# Patient Record
Sex: MALE | Race: WHITE | NOT HISPANIC OR LATINO
[De-identification: names, ages, dates, MRNs, and addresses within clinical notes are randomized per-mention and may not be internally consistent; named-entity substitution may affect disease eponyms.]

---

## 2017-01-24 ENCOUNTER — APPOINTMENT (OUTPATIENT)
Dept: PULMONOLOGY | Facility: CLINIC | Age: 77
End: 2017-01-24

## 2017-02-19 ENCOUNTER — RX RENEWAL (OUTPATIENT)
Age: 77
End: 2017-02-19

## 2017-03-08 ENCOUNTER — APPOINTMENT (OUTPATIENT)
Dept: PULMONOLOGY | Facility: CLINIC | Age: 77
End: 2017-03-08

## 2017-03-11 ENCOUNTER — RX RENEWAL (OUTPATIENT)
Age: 77
End: 2017-03-11

## 2017-04-05 ENCOUNTER — RX RENEWAL (OUTPATIENT)
Age: 77
End: 2017-04-05

## 2017-04-12 ENCOUNTER — APPOINTMENT (OUTPATIENT)
Dept: PULMONOLOGY | Facility: CLINIC | Age: 77
End: 2017-04-12

## 2017-04-12 VITALS
OXYGEN SATURATION: 96 % | HEART RATE: 75 BPM | HEIGHT: 69 IN | DIASTOLIC BLOOD PRESSURE: 60 MMHG | WEIGHT: 228 LBS | SYSTOLIC BLOOD PRESSURE: 113 MMHG | BODY MASS INDEX: 33.77 KG/M2 | RESPIRATION RATE: 15 BRPM

## 2017-04-12 DIAGNOSIS — Z87.09 PERSONAL HISTORY OF OTHER DISEASES OF THE RESPIRATORY SYSTEM: ICD-10-CM

## 2017-08-11 ENCOUNTER — RX RENEWAL (OUTPATIENT)
Age: 77
End: 2017-08-11

## 2017-08-23 ENCOUNTER — APPOINTMENT (OUTPATIENT)
Dept: PULMONOLOGY | Facility: CLINIC | Age: 77
End: 2017-08-23
Payer: MEDICARE

## 2017-08-23 VITALS
WEIGHT: 228 LBS | HEART RATE: 67 BPM | BODY MASS INDEX: 36.64 KG/M2 | RESPIRATION RATE: 16 BRPM | DIASTOLIC BLOOD PRESSURE: 66 MMHG | OXYGEN SATURATION: 93 % | HEIGHT: 66 IN | SYSTOLIC BLOOD PRESSURE: 105 MMHG

## 2017-08-23 PROCEDURE — 99214 OFFICE O/P EST MOD 30 MIN: CPT | Mod: 25

## 2017-08-23 PROCEDURE — 94010 BREATHING CAPACITY TEST: CPT

## 2017-08-23 RX ORDER — LORATADINE 5 MG
TABLET,CHEWABLE ORAL
Refills: 0 | Status: ACTIVE | COMMUNITY

## 2018-01-03 ENCOUNTER — APPOINTMENT (OUTPATIENT)
Dept: PULMONOLOGY | Facility: CLINIC | Age: 78
End: 2018-01-03
Payer: MEDICARE

## 2018-01-03 VITALS
OXYGEN SATURATION: 94 % | HEART RATE: 66 BPM | BODY MASS INDEX: 34.8 KG/M2 | RESPIRATION RATE: 16 BRPM | SYSTOLIC BLOOD PRESSURE: 135 MMHG | HEIGHT: 69 IN | WEIGHT: 235 LBS | DIASTOLIC BLOOD PRESSURE: 70 MMHG

## 2018-01-03 PROCEDURE — 99214 OFFICE O/P EST MOD 30 MIN: CPT | Mod: 25

## 2018-01-03 PROCEDURE — 94010 BREATHING CAPACITY TEST: CPT

## 2018-01-25 ENCOUNTER — RX RENEWAL (OUTPATIENT)
Age: 78
End: 2018-01-25

## 2018-02-28 ENCOUNTER — RX RENEWAL (OUTPATIENT)
Age: 78
End: 2018-02-28

## 2018-03-01 ENCOUNTER — APPOINTMENT (OUTPATIENT)
Dept: UROLOGY | Facility: CLINIC | Age: 78
End: 2018-03-01
Payer: MEDICARE

## 2018-03-01 ENCOUNTER — OUTPATIENT (OUTPATIENT)
Dept: OUTPATIENT SERVICES | Facility: HOSPITAL | Age: 78
LOS: 1 days | End: 2018-03-01
Payer: MEDICARE

## 2018-03-01 PROCEDURE — 76870 US EXAM SCROTUM: CPT

## 2018-03-01 PROCEDURE — 93975 VASCULAR STUDY: CPT | Mod: 26

## 2018-03-01 PROCEDURE — 76870 US EXAM SCROTUM: CPT | Mod: 26

## 2018-03-01 PROCEDURE — 93975 VASCULAR STUDY: CPT

## 2018-03-01 PROCEDURE — 99204 OFFICE O/P NEW MOD 45 MIN: CPT | Mod: 25

## 2018-03-02 LAB
BASOPHILS # BLD AUTO: 0.02 K/UL
BASOPHILS NFR BLD AUTO: 0.3 %
EOSINOPHIL # BLD AUTO: 0.04 K/UL
EOSINOPHIL NFR BLD AUTO: 0.7 %
HBA1C MFR BLD HPLC: 6.6 %
HCT VFR BLD CALC: 41.2 %
HGB BLD-MCNC: 13.5 G/DL
IMM GRANULOCYTES NFR BLD AUTO: 0.3 %
LYMPHOCYTES # BLD AUTO: 1.34 K/UL
LYMPHOCYTES NFR BLD AUTO: 23 %
MAN DIFF?: NORMAL
MCHC RBC-ENTMCNC: 29.5 PG
MCHC RBC-ENTMCNC: 32.8 GM/DL
MCV RBC AUTO: 90 FL
MONOCYTES # BLD AUTO: 0.48 K/UL
MONOCYTES NFR BLD AUTO: 8.2 %
NEUTROPHILS # BLD AUTO: 3.93 K/UL
NEUTROPHILS NFR BLD AUTO: 67.5 %
PLATELET # BLD AUTO: 225 K/UL
RBC # BLD: 4.58 M/UL
RBC # FLD: 14.1 %
WBC # FLD AUTO: 5.83 K/UL

## 2018-03-05 DIAGNOSIS — N45.1 EPIDIDYMITIS: ICD-10-CM

## 2018-03-05 DIAGNOSIS — N48.1 BALANITIS: ICD-10-CM

## 2018-03-20 ENCOUNTER — APPOINTMENT (OUTPATIENT)
Dept: UROLOGY | Facility: CLINIC | Age: 78
End: 2018-03-20
Payer: MEDICARE

## 2018-03-20 PROCEDURE — 99213 OFFICE O/P EST LOW 20 MIN: CPT

## 2018-03-21 LAB
APPEARANCE: CLEAR
BILIRUBIN URINE: NEGATIVE
BLOOD URINE: NEGATIVE
COLOR: YELLOW
GLUCOSE QUALITATIVE U: 500 MG/DL
KETONES URINE: NEGATIVE
LEUKOCYTE ESTERASE URINE: NEGATIVE
NITRITE URINE: NEGATIVE
PH URINE: 5
PROTEIN URINE: NEGATIVE MG/DL
SPECIFIC GRAVITY URINE: 1.02
UROBILINOGEN URINE: NEGATIVE MG/DL

## 2018-03-29 LAB — CORE LAB FLUID CYTOLOGY: NORMAL

## 2018-04-10 ENCOUNTER — APPOINTMENT (OUTPATIENT)
Dept: UROLOGY | Facility: CLINIC | Age: 78
End: 2018-04-10
Payer: MEDICARE

## 2018-04-10 DIAGNOSIS — N48.1 BALANITIS: ICD-10-CM

## 2018-04-10 PROCEDURE — 99213 OFFICE O/P EST LOW 20 MIN: CPT

## 2018-07-13 ENCOUNTER — APPOINTMENT (OUTPATIENT)
Dept: PULMONOLOGY | Facility: CLINIC | Age: 78
End: 2018-07-13
Payer: MEDICARE

## 2018-07-13 ENCOUNTER — NON-APPOINTMENT (OUTPATIENT)
Age: 78
End: 2018-07-13

## 2018-07-13 VITALS
SYSTOLIC BLOOD PRESSURE: 120 MMHG | BODY MASS INDEX: 34.96 KG/M2 | WEIGHT: 236 LBS | HEIGHT: 69 IN | HEART RATE: 60 BPM | DIASTOLIC BLOOD PRESSURE: 70 MMHG | OXYGEN SATURATION: 88 %

## 2018-07-13 PROCEDURE — 99214 OFFICE O/P EST MOD 30 MIN: CPT | Mod: 25

## 2018-07-13 PROCEDURE — 94010 BREATHING CAPACITY TEST: CPT

## 2018-09-29 ENCOUNTER — RX RENEWAL (OUTPATIENT)
Age: 78
End: 2018-09-29

## 2018-11-18 ENCOUNTER — RX RENEWAL (OUTPATIENT)
Age: 78
End: 2018-11-18

## 2018-12-17 ENCOUNTER — FORM ENCOUNTER (OUTPATIENT)
Age: 78
End: 2018-12-17

## 2018-12-18 ENCOUNTER — APPOINTMENT (OUTPATIENT)
Dept: CT IMAGING | Facility: CLINIC | Age: 78
End: 2018-12-18
Payer: MEDICARE

## 2018-12-18 ENCOUNTER — OUTPATIENT (OUTPATIENT)
Dept: OUTPATIENT SERVICES | Facility: HOSPITAL | Age: 78
LOS: 1 days | End: 2018-12-18
Payer: MEDICARE

## 2018-12-18 DIAGNOSIS — Z00.8 ENCOUNTER FOR OTHER GENERAL EXAMINATION: ICD-10-CM

## 2018-12-18 PROCEDURE — 71250 CT THORAX DX C-: CPT | Mod: 26

## 2018-12-18 PROCEDURE — 71250 CT THORAX DX C-: CPT

## 2019-01-07 ENCOUNTER — APPOINTMENT (OUTPATIENT)
Dept: PULMONOLOGY | Facility: CLINIC | Age: 79
End: 2019-01-07
Payer: MEDICARE

## 2019-01-07 ENCOUNTER — NON-APPOINTMENT (OUTPATIENT)
Age: 79
End: 2019-01-07

## 2019-01-07 VITALS
HEIGHT: 69 IN | WEIGHT: 230 LBS | DIASTOLIC BLOOD PRESSURE: 73 MMHG | SYSTOLIC BLOOD PRESSURE: 131 MMHG | BODY MASS INDEX: 34.07 KG/M2 | OXYGEN SATURATION: 92 % | RESPIRATION RATE: 17 BRPM | HEART RATE: 76 BPM

## 2019-01-07 PROCEDURE — 94010 BREATHING CAPACITY TEST: CPT

## 2019-01-07 PROCEDURE — 95012 NITRIC OXIDE EXP GAS DETER: CPT

## 2019-01-07 PROCEDURE — 99214 OFFICE O/P EST MOD 30 MIN: CPT | Mod: 25

## 2019-01-07 NOTE — ADDENDUM
[FreeTextEntry1] : All medical record entries made by ashutosh Bang were at Dr. Renan Delgado's, direction and personally dictated by me on 01/07/2019. I have reviewed the chart and agree that the record accurately reflects my personal performance of the history, physical exam, assessment and plan. I have also personally directed, reviewed, and agree with the discharge instructions.

## 2019-01-07 NOTE — PROCEDURE
[FreeTextEntry1] : PFT - spi reveals moderate restrictive dysfunction ; FEV1 is 1.37 which is 49% of predicted, normal flow volume loop \par \par Chest CT (December 18, 2018) reveals s/p esophagectomy with gastric pull-up. elevation of the left hemidiaphragm. \par \par FENO was 9; a normal value being less than 25\par \par Fractional exhaled nitric oxide (FENO) is regarded as a simple, noninvasive method for assessing eosinophilic airway inflammation. Produced by a variety of cells within the lung, nitric oxide (NO) concentrations are generally low in healthy individuals. However, high concentrations of NO appear to be involved in nonspecific host defense mechanisms and chronic inflammatory diseases such as asthma. The American Thor[employer] Society (ATS) therefore has recommended using FENO to aid in the diagnosis and monitoring of eosinophilic airway inflammation and asthma, and for identifying steroid responsive individuals whose chronic respiratory symptoms may be caused by airway inflammation. \par .

## 2019-01-07 NOTE — REVIEW OF SYSTEMS
[Negative] : Sleep Disorder [Fatigue] : fatigue [Recent Wt Loss (___ Lbs)] : recent [unfilled] ~Ulb weight loss [As Noted in HPI] : as noted in HPI [Dyspnea] : dyspnea

## 2019-01-07 NOTE — REASON FOR VISIT
[Follow-Up] : a follow-up visit [FreeTextEntry1] : abnormal chest CT, acid reflux disease, allergic rhinitis, asthma, obstructive lung disease, CHILO and SOB

## 2019-01-07 NOTE — PHYSICAL EXAM
[General Appearance - Well Developed] : well developed [Normal Appearance] : normal appearance [Well Groomed] : well groomed [General Appearance - Well Nourished] : well nourished [No Deformities] : no deformities [General Appearance - In No Acute Distress] : no acute distress [Normal Conjunctiva] : the conjunctiva exhibited no abnormalities [Eyelids - No Xanthelasma] : the eyelids demonstrated no xanthelasmas [Normal Oropharynx] : normal oropharynx [Neck Appearance] : the appearance of the neck was normal [Neck Cervical Mass (___cm)] : no neck mass was observed [Jugular Venous Distention Increased] : there was no jugular-venous distention [Thyroid Diffuse Enlargement] : the thyroid was not enlarged [Thyroid Nodule] : there were no palpable thyroid nodules [Heart Rate And Rhythm] : heart rate and rhythm were normal [Heart Sounds] : normal S1 and S2 [Murmurs] : no murmurs present [Respiration, Rhythm And Depth] : normal respiratory rhythm and effort [Exaggerated Use Of Accessory Muscles For Inspiration] : no accessory muscle use [Auscultation Breath Sounds / Voice Sounds] : lungs were clear to auscultation bilaterally [Abdomen Soft] : soft [Abdomen Tenderness] : non-tender [Abdomen Mass (___ Cm)] : no abdominal mass palpated [Abnormal Walk] : normal gait [Gait - Sufficient For Exercise Testing] : the gait was sufficient for exercise testing [Nail Clubbing] : no clubbing of the fingernails [Cyanosis, Localized] : no localized cyanosis [Petechial Hemorrhages (___cm)] : no petechial hemorrhages [Skin Color & Pigmentation] : normal skin color and pigmentation [] : no rash [No Venous Stasis] : no venous stasis [Skin Lesions] : no skin lesions [No Skin Ulcers] : no skin ulcer [No Xanthoma] : no  xanthoma was observed [Deep Tendon Reflexes (DTR)] : deep tendon reflexes were 2+ and symmetric [Sensation] : the sensory exam was normal to light touch and pinprick [No Focal Deficits] : no focal deficits [Oriented To Time, Place, And Person] : oriented to person, place, and time [Impaired Insight] : insight and judgment were intact [Affect] : the affect was normal [II] : II [Kyphosis] : kyphosis [FreeTextEntry1] : I:E ratio 1:3; clear  [FreeTextEntry2] : trace pedal edema

## 2019-01-07 NOTE — HISTORY OF PRESENT ILLNESS
[FreeTextEntry1] : Mr. Hays is 78 year old male with a history of abnormal chest CT, acid reflux disease, allergic rhinitis, asthma, obstructive lung disease, CHILO and SOB presenting to the office today for a follow up visit. His chief complaint is fatigue and SOB\par -he state that he has been sleeping poorly, as he will wake up during the night due to anxiety and not be able to fall back asleep. he states that this is causing him to become very fatigued\par -he states that he has not been exercising, as he feels he has been getting more short of breath\par -he states that he has recently lost some weight\par -he reports that he has been getting occasional nighttime leg cramps which he willl drink tonic water to help resolve. this happens at least 1-2 times per week\par -he denies any headaches, nausea, vomiting, fever, chills, sweats, chest pain, chest pressure, diarrhea, constipation, dysphagia, dizziness, leg swelling, leg pain, itchy eyes, itchy ears, heartburn, reflux, or sour taste in the mouth.

## 2019-01-07 NOTE — ASSESSMENT
[FreeTextEntry1] : Mr. Hays is a 78 year old male with a history of COPD / CAD, OSAS ([place]) - He is still short of breath. \par \par His shortness of breath is multifactorial due to:\par -restrictive dysfunction\par -obesity\par -poor breathing mechanics/poor balance\par -cardiac disease\par -asthma\par \par problem 1: asthma / COPD\par -continue to use Breo Ellipta 200 1 inhalation QD\par -followed by Spiriva 1 inhalation QD\par -add Ventolin 2 puffs Q6H \par -Asthma is believed to be caused by inherited (genetic) and environmental factor, but its exact cause is unknown. Asthma may be triggered by allergens, lung infections, or irritants in the air. Asthma triggers are different for each person\par -Inhaler technique reviewed as well as oral hygiene techniques reviewed with patient. Avoidance of cold air, extremes of temperature, rescue inhaler should be used before exercise. Order of medication reviewed with patient. Recommended use of a cool mist humidifier in the bedroom. \par \par problem 2: allergic rhinitis\par -continue to use Astelin 1 sniff each nostril BID \par -continue Atrovent 6% nasal spray 1 inhalation each nostril TID \par \par -Environmental measures for allergies were encouraged including mattress and pillow cover, air purifier, and environmental controls.\par \par problem 3: GERD\par -continue to use Prilosec 40 mg before breakfast\par -continue to use Zantac 300 mg before bed\par \par -Rule of 2s: avoid eating too much, eating too late, eating too spicy, eating two hours before bed\par -Things to avoid including overeating, spicy foods, tight clothing, eating within three hours of bed, this list is not all inclusive. \par -For treatment of reflux, possible options discussed including diet control, H2 blockers, PPIs, as well as coating motility agents discussed as treatment options. Timing of meals and proximity of last meal to sleep were discussed. If symptoms persist, a formal gastrointestinal evaluation is needed. \par \par problem 4: CHILO\par -being recommended to use Oxy-Aid by [employer]\par -Sleep apnea is associated with adverse clinical consequences which an affect most organ systems. Cardiovascular disease risk includes arrhythmias, atrial fibrillation, hypertension, coronary artery disease, and stroke. Metabolic disorders include diabetes type 2, non-alcoholic fatty liver disease. Mood disorder especially depression; and cognitive decline especially in the elderly. Associations with chronic reflux/Fajardo’s esophagus some but not all inclusive. \par -Reasons to assess this include arousal consistent with hypopnea; respiratory events most prominent in REM sleep or supine position; therefore sleep staging and body position are important for accurate diagnosis and estimation of AHI. \par \par problem 6: abnormal chest CT\par -follow up chest CT will be followed up - next 10/19\par \par problem 7: obesity\par -Weight loss, exercise, and diet control were discussed and are highly encouraged. Treatment options were given such as, aqua therapy, and contacting a nutritionist. Recommended to use the elliptical, stationary bike, less use of treadmill. Obesity is associated with worsening asthma, shortness of breath, and potential for cardiac disease, diabetes, and other underlying medical conditions. \par \par problem 8: bloating\par -recommended probiotic, and was prescribed Align \par \par problem 9: poor breathing mechanics\par -Proper breathing techniques were reviewed with an emphasis of exhalation. Patient instructed to breath in for 1 second and out for four seconds. Patient was encouraged to not talk while walking.\par \par problem 10 : health maintenance \par -recommended to try IB-Jordan\par -recommended influenza vaccination during flu season\par -s/p strep pneumonia vaccines: Prevnar-13 vaccine, followed by Pneumo vaccine 23 one year following\par -recommended early intervention for URIs\par -recommended regular osteoporosis evaluations\par -recommended early dermatological evaluations\par -recommended after the age of 50 to the age of 70, colonoscopy every 5 years \par  \par \par F/U in 6 months with full PFTs\par He is encouraged to call with any changes, concerns, or questions.

## 2019-02-21 ENCOUNTER — MEDICATION RENEWAL (OUTPATIENT)
Age: 79
End: 2019-02-21

## 2019-03-19 ENCOUNTER — RX RENEWAL (OUTPATIENT)
Age: 79
End: 2019-03-19

## 2019-05-09 ENCOUNTER — RX RENEWAL (OUTPATIENT)
Age: 79
End: 2019-05-09

## 2019-05-23 ENCOUNTER — APPOINTMENT (OUTPATIENT)
Dept: PULMONOLOGY | Facility: CLINIC | Age: 79
End: 2019-05-23
Payer: MEDICARE

## 2019-05-23 VITALS
RESPIRATION RATE: 17 BRPM | BODY MASS INDEX: 32.29 KG/M2 | HEIGHT: 69 IN | DIASTOLIC BLOOD PRESSURE: 70 MMHG | HEART RATE: 77 BPM | OXYGEN SATURATION: 92 % | WEIGHT: 218 LBS | SYSTOLIC BLOOD PRESSURE: 110 MMHG

## 2019-05-23 DIAGNOSIS — R05 COUGH: ICD-10-CM

## 2019-05-23 PROCEDURE — 99214 OFFICE O/P EST MOD 30 MIN: CPT

## 2019-06-05 NOTE — HISTORY OF PRESENT ILLNESS
[FreeTextEntry1] : Mr. Hays is 78 year old male with a history of abnormal chest CT, acid reflux disease, allergic rhinitis, asthma, obstructive lung disease, CHILO and SOB presenting to the office today for an acute visit.\par \par Pt reports that for the last 4 days he has been dealing with heaviness in his chest/fullness type feeling in his upper chest and a non stop hacking cough.  He has brought up brown colored mucus here and there. He states he does have post nasal drip and is feeling more short of breath.\par \par He is able to sleep through the night but reports he is using CBD oil. \par Pt denies fever, chills, wheezing, sinus pressure, sore throat, ear pain, GERD symptoms, HA, rashes or muscle cramps. \par \par Pt is on warfarin with varied doses. \par Pt reports he sees Dr. Fournier for his GERD- he takes reglan before meals and zantac nightly.\par He reports his last A1c was 7.2.

## 2019-06-05 NOTE — PHYSICAL EXAM
[General Appearance - Well Developed] : well developed [Normal Appearance] : normal appearance [General Appearance - Well Nourished] : well nourished [Well Groomed] : well groomed [General Appearance - In No Acute Distress] : no acute distress [Normal Conjunctiva] : the conjunctiva exhibited no abnormalities [No Deformities] : no deformities [Eyelids - No Xanthelasma] : the eyelids demonstrated no xanthelasmas [Normal Oropharynx] : normal oropharynx [II] : II [Neck Appearance] : the appearance of the neck was normal [Neck Cervical Mass (___cm)] : no neck mass was observed [Jugular Venous Distention Increased] : there was no jugular-venous distention [Thyroid Diffuse Enlargement] : the thyroid was not enlarged [Thyroid Nodule] : there were no palpable thyroid nodules [Heart Rate And Rhythm] : heart rate and rhythm were normal [Heart Sounds] : normal S1 and S2 [Murmurs] : no murmurs present [Auscultation Breath Sounds / Voice Sounds] : lungs were clear to auscultation bilaterally [Respiration, Rhythm And Depth] : normal respiratory rhythm and effort [Exaggerated Use Of Accessory Muscles For Inspiration] : no accessory muscle use [Kyphosis] : kyphosis [FreeTextEntry1] : I:E ratio 1:3; clear  [Abdomen Soft] : soft [Abdomen Tenderness] : non-tender [Abdomen Mass (___ Cm)] : no abdominal mass palpated [Gait - Sufficient For Exercise Testing] : the gait was sufficient for exercise testing [Abnormal Walk] : normal gait [Petechial Hemorrhages (___cm)] : no petechial hemorrhages [Nail Clubbing] : no clubbing of the fingernails [Cyanosis, Localized] : no localized cyanosis [FreeTextEntry2] : trace pedal edema [Skin Color & Pigmentation] : normal skin color and pigmentation [] : no rash [No Venous Stasis] : no venous stasis [Skin Lesions] : no skin lesions [No Skin Ulcers] : no skin ulcer [No Xanthoma] : no  xanthoma was observed [Sensation] : the sensory exam was normal to light touch and pinprick [No Focal Deficits] : no focal deficits [Deep Tendon Reflexes (DTR)] : deep tendon reflexes were 2+ and symmetric [Impaired Insight] : insight and judgment were intact [Oriented To Time, Place, And Person] : oriented to person, place, and time [Affect] : the affect was normal

## 2019-06-05 NOTE — ASSESSMENT
[FreeTextEntry1] : The plan for the patient is as follows:\par His shortness of breath is multifactorial due to:\par -restrictive dysfunction\par -obesity\par -poor breathing mechanics/poor balance\par -cardiac disease\par -asthma\par \par problem 1: asthmatic bronchitis with severe cough\par -add Levaquin 500 mg PO x 8 days and advised to stop cholesterol meds and not to exercise during the duration of the antibiotic. Advised to get his INR checked sooner to ensure his warfarin dosing is correct as antibiotics affect warfarin. Pt agreeable.\par -add nebulizer with duoneb TID-QID; new tubing provided\par -continue to use Breo Ellipta 200 1 inhalation QD\par -followed by Spiriva 1 inhalation QD\par -add Ventolin 2 puffs Q6H if cant nebulize\par -Hold off on OCS due to DM history, re-assess if pt not improving. \par \par problem 2: Cough\par -add tessalon perles 200 mg PO TID prn cough\par \par problem 3: allergic rhinitis with post nasal drip- contributing to cough\par -restart Astelin 1 sniff each nostril BID \par -continue Atrovent 6% nasal spray 1 inhalation each nostril TID \par \par problem 4: GERD-reports stable\par -continue to use reglan tablet before meals, QHS\par -continue to use Zantac 300 mg before bed\par -Per Dr. Fournier\par -Things to avoid including overeating, spicy foods, tight clothing, eating within three hours of bed, this list is not all inclusive. \par \par problem 5: CHILO\par -oxy-aid use\par \par problem 6: abnormal chest CT\par -next CT due 10/2019\par \par F/U as scheduled with Dr. Delgado/full PFTs\par He is encouraged to call with any changes, concerns, or questions.\par Pt to call if not improving.

## 2019-06-05 NOTE — REASON FOR VISIT
[Acute] : an acute visit [Abnormal CT Scan] : abnormal CT Scan [Asthma] : asthma [Sleep Apnea] : sleep apnea

## 2019-06-05 NOTE — REVIEW OF SYSTEMS
[Fever] : no fever [Chills] : no chills [Fatigue] : fatigue [Cough] : cough [Sputum] : sputum  [Chest Tightness] : chest tightness [Dyspnea] : dyspnea [Wheezing] : no wheezing [As Noted in HPI] : as noted in HPI [Negative] : Pulmonary Hypertension

## 2019-07-09 ENCOUNTER — APPOINTMENT (OUTPATIENT)
Dept: PULMONOLOGY | Facility: CLINIC | Age: 79
End: 2019-07-09
Payer: MEDICARE

## 2019-07-09 VITALS
OXYGEN SATURATION: 96 % | HEART RATE: 74 BPM | HEIGHT: 69 IN | DIASTOLIC BLOOD PRESSURE: 70 MMHG | BODY MASS INDEX: 33.03 KG/M2 | SYSTOLIC BLOOD PRESSURE: 110 MMHG | RESPIRATION RATE: 16 BRPM | WEIGHT: 223 LBS

## 2019-07-09 PROCEDURE — 99214 OFFICE O/P EST MOD 30 MIN: CPT | Mod: 25

## 2019-07-09 PROCEDURE — 94060 EVALUATION OF WHEEZING: CPT

## 2019-07-09 PROCEDURE — 94729 DIFFUSING CAPACITY: CPT

## 2019-07-09 NOTE — ASSESSMENT
[FreeTextEntry1] : Mr. Hays is a 78 year old male with a history of COPD / CAD, OSAS ([place]) - His number one issue is back sciatica.\par \par His shortness of breath is multifactorial due to:\par -restrictive dysfunction\par -obesity\par -poor breathing mechanics/poor balance\par -cardiac disease\par -asthma\par \par problem 1: asthma / COPD\par -continue to use Breo Ellipta 200 1 inhalation QD\par -followed by Spiriva 1 inhalation QD\par -add Ventolin 2 puffs Q6H \par -Asthma is believed to be caused by inherited (genetic) and environmental factor, but its exact cause is unknown. Asthma may be triggered by allergens, lung infections, or irritants in the air. Asthma triggers are different for each person\par -Inhaler technique reviewed as well as oral hygiene techniques reviewed with patient. Avoidance of cold air, extremes of temperature, rescue inhaler should be used before exercise. Order of medication reviewed with patient. Recommended use of a cool mist humidifier in the bedroom. \par \par problem 2: allergic rhinitis\par -continue to use Astelin 1 sniff each nostril BID \par -continue Atrovent 6% nasal spray 1 inhalation each nostril TID \par \par -Environmental measures for allergies were encouraged including mattress and pillow cover, air purifier, and environmental controls.\par \par problem 3: GERD\par -continue to use Prilosec 40 mg before breakfast\par -continue to use Zantac 300 mg before bed\par \par -Rule of 2s: avoid eating too much, eating too late, eating too spicy, eating two hours before bed\par -Things to avoid including overeating, spicy foods, tight clothing, eating within three hours of bed, this list is not all inclusive. \par -For treatment of reflux, possible options discussed including diet control, H2 blockers, PPIs, as well as coating motility agents discussed as treatment options. Timing of meals and proximity of last meal to sleep were discussed. If symptoms persist, a formal gastrointestinal evaluation is needed. \par \par problem 4: CHILO\par -being recommended to use Oxy-Aid by [employer], or "chin strap"\par -Sleep apnea is associated with adverse clinical consequences which an affect most organ systems. Cardiovascular disease risk includes arrhythmias, atrial fibrillation, hypertension, coronary artery disease, and stroke. Metabolic disorders include diabetes type 2, non-alcoholic fatty liver disease. Mood disorder especially depression; and cognitive decline especially in the elderly. Associations with chronic reflux/Fajardo’s esophagus some but not all inclusive. \par -Reasons to assess this include arousal consistent with hypopnea; respiratory events most prominent in REM sleep or supine position; therefore sleep staging and body position are important for accurate diagnosis and estimation of AHI. \par \par problem 6: abnormal chest CT\par -follow up chest CT will be followed up - next 10/19\par \par problem 7: obesity\par -Weight loss, exercise, and diet control were discussed and are highly encouraged. Treatment options were given such as, aqua therapy, and contacting a nutritionist. Recommended to use the elliptical, stationary bike, less use of treadmill. Obesity is associated with worsening asthma, shortness of breath, and potential for cardiac disease, diabetes, and other underlying medical conditions. \par \par problem 8: bloating\par -recommended probiotic, and was prescribed Align \par \par problem 9: poor breathing mechanics\par -Proper breathing techniques were reviewed with an emphasis of exhalation. Patient instructed to breath in for 1 second and out for four seconds. Patient was encouraged to not talk while walking.\par \par Problem 10: Sciatica\par -Recommended to use "Lift-Me-Up" bed reclining appliance\par \par problem 11 : health maintenance \par -recommended to try IB-Jordan\par -recommended influenza vaccination during flu season\par -s/p strep pneumonia vaccines: Prevnar-13 vaccine, followed by Pneumo vaccine 23 one year following\par -recommended early intervention for URIs\par -recommended regular osteoporosis evaluations\par -recommended early dermatological evaluations\par -recommended after the age of 50 to the age of 70, colonoscopy every 5 years \par  \par \par F/U in 6 months with full PFTs\par He is encouraged to call with any changes, concerns, or questions.

## 2019-07-09 NOTE — HISTORY OF PRESENT ILLNESS
[FreeTextEntry1] : Mr. Hays is 78 year old male with a history of abnormal chest CT, acid reflux disease, allergic rhinitis, asthma, obstructive lung disease, CHILO and SOB presenting to the office today for a follow up visit. His chief complaint is recent back and leg injury.\par -he reports he hurt his back on June 8th.  He denies any fracture.  The next day, he could not use his right leg without severe pain.  He was unable to lay down for an x-ray without using Morphine.  The x-ray showed a non-severe lumbar 5 herniated disc, with a pinched sciatic nerve.\par -he reports having had an epidural, which only helped the pain about 70%.  He reports he still has intermittent leg pain, occurring when he walks\par -he note he has dizziness upon standing\par -he notes his pain is compromising his energy level.  He notes he has been in a wheelchair for about 1 month\par -he denies any SOB with his injury, even on his back or at night\par -he reports he is sleeping in his recliner, as he is unable to lay on his back or stomach without pain\par -he denies any snoring, bloating, chest pain, chest pressure, diarrhea, constipation, dysphagia, leg swelling, itchy eyes, itchy ears, heartburn, reflux, sour taste in the mouth

## 2019-07-09 NOTE — PROCEDURE
[FreeTextEntry1] : PFT with DLCO revealed moderate restrictive dysfunction, with a FEV1 of 1.85L, which is 58% of predicted, with no change with use of bronchodilator, and a mildly-moderately reduced diffusion of 13.8, which is 62% of predicted, with a normal flow volume loop

## 2019-07-09 NOTE — PHYSICAL EXAM
[General Appearance - Well Developed] : well developed [Well Groomed] : well groomed [Normal Appearance] : normal appearance [General Appearance - Well Nourished] : well nourished [No Deformities] : no deformities [General Appearance - In No Acute Distress] : no acute distress [Normal Conjunctiva] : the conjunctiva exhibited no abnormalities [Eyelids - No Xanthelasma] : the eyelids demonstrated no xanthelasmas [Normal Oropharynx] : normal oropharynx [Jugular Venous Distention Increased] : there was no jugular-venous distention [Neck Appearance] : the appearance of the neck was normal [Neck Cervical Mass (___cm)] : no neck mass was observed [Thyroid Nodule] : there were no palpable thyroid nodules [Thyroid Diffuse Enlargement] : the thyroid was not enlarged [Heart Rate And Rhythm] : heart rate and rhythm were normal [Heart Sounds] : normal S1 and S2 [Respiration, Rhythm And Depth] : normal respiratory rhythm and effort [Exaggerated Use Of Accessory Muscles For Inspiration] : no accessory muscle use [Auscultation Breath Sounds / Voice Sounds] : lungs were clear to auscultation bilaterally [Kyphosis] : kyphosis [Abdomen Soft] : soft [Abdomen Mass (___ Cm)] : no abdominal mass palpated [Abdomen Tenderness] : non-tender [Nail Clubbing] : no clubbing of the fingernails [Gait - Sufficient For Exercise Testing] : the gait was sufficient for exercise testing [Petechial Hemorrhages (___cm)] : no petechial hemorrhages [Cyanosis, Localized] : no localized cyanosis [Skin Color & Pigmentation] : normal skin color and pigmentation [] : no rash [Skin Lesions] : no skin lesions [No Venous Stasis] : no venous stasis [No Skin Ulcers] : no skin ulcer [No Xanthoma] : no  xanthoma was observed [Deep Tendon Reflexes (DTR)] : deep tendon reflexes were 2+ and symmetric [No Focal Deficits] : no focal deficits [Sensation] : the sensory exam was normal to light touch and pinprick [Oriented To Time, Place, And Person] : oriented to person, place, and time [Impaired Insight] : insight and judgment were intact [Affect] : the affect was normal [III] : III [FreeTextEntry1] : wheelchair bound [FreeTextEntry2] : trace pedal edema

## 2019-07-09 NOTE — REVIEW OF SYSTEMS
[Negative] : Sleep Disorder [As Noted in HPI] : as noted in HPI [Trauma] : ~T physical trauma [Kyphoscoliosis] : kyphoscoliosis [Myalgias] : myalgias [Back Pain] : ~T back pain [Arthralgias] : arthralgias

## 2019-07-09 NOTE — ADDENDUM
[FreeTextEntry1] : Documented by Srini Pinon acting as a scribe for Dr. Renan Delgado on 07/09/2019.\par \par All medical record entries made by the Scribe were at my, Dr. Renan Delgado's, direction and personally dictated by me on 07/09/2019. I have reviewed the chart and agree that the record accurately reflects my personal performance of the history, physical exam, assessment and plan. I have also personally directed, reviewed, and agree with the discharge instructions.

## 2019-08-19 ENCOUNTER — RX RENEWAL (OUTPATIENT)
Age: 79
End: 2019-08-19

## 2019-08-26 ENCOUNTER — RX RENEWAL (OUTPATIENT)
Age: 79
End: 2019-08-26

## 2019-10-24 ENCOUNTER — RX RENEWAL (OUTPATIENT)
Age: 79
End: 2019-10-24

## 2019-11-14 ENCOUNTER — RX RENEWAL (OUTPATIENT)
Age: 79
End: 2019-11-14

## 2020-01-06 ENCOUNTER — APPOINTMENT (OUTPATIENT)
Dept: PULMONOLOGY | Facility: CLINIC | Age: 80
End: 2020-01-06
Payer: MEDICARE

## 2020-01-06 VITALS
OXYGEN SATURATION: 92 % | DIASTOLIC BLOOD PRESSURE: 70 MMHG | BODY MASS INDEX: 33.68 KG/M2 | SYSTOLIC BLOOD PRESSURE: 130 MMHG | HEIGHT: 68 IN | WEIGHT: 222.25 LBS | HEART RATE: 65 BPM | RESPIRATION RATE: 17 BRPM

## 2020-01-06 PROCEDURE — 94010 BREATHING CAPACITY TEST: CPT

## 2020-01-06 PROCEDURE — 94729 DIFFUSING CAPACITY: CPT

## 2020-01-06 PROCEDURE — 71046 X-RAY EXAM CHEST 2 VIEWS: CPT

## 2020-01-06 PROCEDURE — 99214 OFFICE O/P EST MOD 30 MIN: CPT | Mod: 25

## 2020-01-06 RX ORDER — LEVOFLOXACIN 500 MG/1
500 TABLET, FILM COATED ORAL
Qty: 8 | Refills: 0 | Status: DISCONTINUED | COMMUNITY
Start: 2019-05-23 | End: 2020-01-06

## 2020-01-06 NOTE — HISTORY OF PRESENT ILLNESS
[FreeTextEntry1] : Mr. Hays is 79 year old male with a history of abnormal chest CT, acid reflux disease, allergic rhinitis, asthma, obstructive lung disease, CHILO and SOB presenting to the office today for a follow up visit. His chief complaint is cough. \par \par - Patient states that he is not feeling well \par - He is coughing now and brings up brownish/grayish mucus \par - The cough is worse when is sitting still \par - His patient has low energy level of 8/10 with 10 being the worst \par - He states that he does have a stomach issue but its due to the diaphragm\par - He states that generally his bowels are regular but now his bowel movements may skip a day \par - He stopped eating bananas \par - He does not move as much \par - He is staying hydrated \par - He still can't sleep in bed \par No new medications and supplements\par - He is taking the albuterol, pro-Air, Spiriva, Breo\par - He states that the cough is coming from throat \par - He also c/o of his sinuses \par  - \par \par denies any headaches, nausea, vomiting, fever, chills, sweats, chest pain, chest pressure, diarrhea, constipation, dysphagia, dizziness, sour taste in the mouth, leg swelling, leg pain, itchy eyes, itchy ears, heartburn, reflux, myalgias or arthralgias\par

## 2020-01-06 NOTE — ASSESSMENT
[FreeTextEntry1] : Mr. 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 He is currently presenting for a cough. \par \par His shortness of breath is multifactorial due to:\par -restrictive dysfunction\par -obesity\par -poor breathing mechanics/poor balance\par -cardiac disease\par -asthma\par \par problem 1: asthma / COPD (active) \par -add Alvesco 160 2 puffs BID\par -continue to use Breo Ellipta 200 1 inhalation QD\par -followed by Spiriva 1 inhalation QD\par -add Ventolin 2 puffs Q6H \par -Asthma is believed to be caused by inherited (genetic) and environmental factor, but its exact cause is unknown. Asthma may be triggered by allergens, lung infections, or irritants in the air. Asthma triggers are different for each person\par -Inhaler technique reviewed as well as oral hygiene techniques reviewed with patient. Avoidance of cold air, extremes of temperature, rescue inhaler should be used before exercise. Order of medication reviewed with patient. Recommended use of a cool mist humidifier in the bedroom. \par \par problem 2: allergic rhinitis\par -continue to use Astelin 1 sniff each nostril BID \par -continue Atrovent 6% nasal spray 1 inhalation each nostril TID \par -add Clarinex 5 mg QAM\par \par \par -Environmental measures for allergies were encouraged including mattress and pillow cover, air purifier, and environmental controls.\par \par problem 3: GERD\par -continue to use Prilosec 40 mg before breakfast\par -continue to use Zantac 300 mg before bed\par \par -Rule of 2s: avoid eating too much, eating too late, eating too spicy, eating two hours before bed\par -Things to avoid including overeating, spicy foods, tight clothing, eating within three hours of bed, this list is not all inclusive. \par -For treatment of reflux, possible options discussed including diet control, H2 blockers, PPIs, as well as coating motility agents discussed as treatment options. Timing of meals and proximity of last meal to sleep were discussed. If symptoms persist, a formal gastrointestinal evaluation is needed. \par \par problem 4: CHILO\par -being recommended to use Oxy-Aid by [employer], or "chin strap"\par -Sleep apnea is associated with adverse clinical consequences which an affect most organ systems. Cardiovascular disease risk includes arrhythmias, atrial fibrillation, hypertension, coronary artery disease, and stroke. Metabolic disorders include diabetes type 2, non-alcoholic fatty liver disease. Mood disorder especially depression; and cognitive decline especially in the elderly. Associations with chronic reflux/Fajardo’s esophagus some but not all inclusive. \par -Reasons to assess this include arousal consistent with hypopnea; respiratory events most prominent in REM sleep or supine position; therefore sleep staging and body position are important for accurate diagnosis and estimation of AHI. \par \par problem 6: abnormal chest CT\par -follow up chest CT will be followed up - overdue\par \par problem 7: obesity\par -Weight loss, exercise, and diet control were discussed and are highly encouraged. Treatment options were given such as, aqua therapy, and contacting a nutritionist. Recommended to use the elliptical, stationary bike, less use of treadmill. Obesity is associated with worsening asthma, shortness of breath, and potential for cardiac disease, diabetes, and other underlying medical conditions. \par \par problem 8: bloating\par -recommended probiotic, and was prescribed Align \par \par problem 9: poor breathing mechanics\par -Proper breathing techniques were reviewed with an emphasis of exhalation. Patient instructed to breath in for 1 second and out for four seconds. Patient was encouraged to not talk while walking.\par \par Problem 10: Sciatica\par -Recommended to use "Lift-Me-Up" bed reclining appliance\par \par problem 11 : health maintenance \par -recommended to try IB-Jordan\par -recommended influenza vaccination during flu season\par -s/p strep pneumonia vaccines: Prevnar-13 vaccine, followed by Pneumo vaccine 23 one year following\par -recommended early intervention for URIs\par -recommended regular osteoporosis evaluations\par -recommended early dermatological evaluations\par -recommended after the age of 50 to the age of 70, colonoscopy every 5 years \par  \par \par F/U in 6 months with full PFTs\par He is encouraged to call with any changes, concerns, or questions.

## 2020-01-06 NOTE — REVIEW OF SYSTEMS
[As Noted in HPI] : as noted in HPI [Trauma] : ~T physical trauma [Kyphoscoliosis] : kyphoscoliosis [Back Pain] : ~T back pain [Myalgias] : myalgias [Arthralgias] : arthralgias [Negative] : Sleep Disorder

## 2020-01-06 NOTE — PHYSICAL EXAM
[Normal Appearance] : normal appearance [General Appearance - Well Developed] : well developed [Well Groomed] : well groomed [General Appearance - Well Nourished] : well nourished [No Deformities] : no deformities [General Appearance - In No Acute Distress] : no acute distress [Normal Conjunctiva] : the conjunctiva exhibited no abnormalities [Eyelids - No Xanthelasma] : the eyelids demonstrated no xanthelasmas [Normal Oropharynx] : normal oropharynx [III] : III [Neck Appearance] : the appearance of the neck was normal [Neck Cervical Mass (___cm)] : no neck mass was observed [Thyroid Diffuse Enlargement] : the thyroid was not enlarged [Jugular Venous Distention Increased] : there was no jugular-venous distention [Heart Rate And Rhythm] : heart rate and rhythm were normal [Thyroid Nodule] : there were no palpable thyroid nodules [Heart Sounds] : normal S1 and S2 [Respiration, Rhythm And Depth] : normal respiratory rhythm and effort [Exaggerated Use Of Accessory Muscles For Inspiration] : no accessory muscle use [Auscultation Breath Sounds / Voice Sounds] : lungs were clear to auscultation bilaterally [Kyphosis] : kyphosis [Abdomen Tenderness] : non-tender [Abdomen Soft] : soft [Gait - Sufficient For Exercise Testing] : the gait was sufficient for exercise testing [Abdomen Mass (___ Cm)] : no abdominal mass palpated [Nail Clubbing] : no clubbing of the fingernails [Cyanosis, Localized] : no localized cyanosis [Petechial Hemorrhages (___cm)] : no petechial hemorrhages [] : no rash [Skin Color & Pigmentation] : normal skin color and pigmentation [No Venous Stasis] : no venous stasis [Skin Lesions] : no skin lesions [No Skin Ulcers] : no skin ulcer [No Xanthoma] : no  xanthoma was observed [Sensation] : the sensory exam was normal to light touch and pinprick [Deep Tendon Reflexes (DTR)] : deep tendon reflexes were 2+ and symmetric [No Focal Deficits] : no focal deficits [Oriented To Time, Place, And Person] : oriented to person, place, and time [Impaired Insight] : insight and judgment were intact [Affect] : the affect was normal [FreeTextEntry1] : wheelchair bound [FreeTextEntry2] : 1+ LE edema

## 2020-01-06 NOTE — PROCEDURE
[FreeTextEntry1] : CXR reveals mildly enlarged heart, paralysed left diaphragm with volume loss, and hiatal hernia\par \par Full PFT revealed moderate obstructive dysfunction, with a FEV1 of 1.49 L, which is 47% of predicted, and a mildly reduced  diffusion of 13.5, which is 61% of predicted, with a normal flow volume loop

## 2020-02-13 ENCOUNTER — RX RENEWAL (OUTPATIENT)
Age: 80
End: 2020-02-13

## 2020-03-17 ENCOUNTER — RX RENEWAL (OUTPATIENT)
Age: 80
End: 2020-03-17

## 2020-04-03 ENCOUNTER — RX RENEWAL (OUTPATIENT)
Age: 80
End: 2020-04-03

## 2020-05-06 ENCOUNTER — RX RENEWAL (OUTPATIENT)
Age: 80
End: 2020-05-06

## 2020-05-13 ENCOUNTER — RX RENEWAL (OUTPATIENT)
Age: 80
End: 2020-05-13

## 2020-07-13 ENCOUNTER — APPOINTMENT (OUTPATIENT)
Dept: PULMONOLOGY | Facility: CLINIC | Age: 80
End: 2020-07-13
Payer: MEDICARE

## 2020-07-13 VITALS
OXYGEN SATURATION: 97 % | HEART RATE: 66 BPM | SYSTOLIC BLOOD PRESSURE: 120 MMHG | DIASTOLIC BLOOD PRESSURE: 60 MMHG | RESPIRATION RATE: 17 BRPM | TEMPERATURE: 98 F

## 2020-07-13 PROCEDURE — 99214 OFFICE O/P EST MOD 30 MIN: CPT | Mod: 25

## 2020-07-13 PROCEDURE — 94618 PULMONARY STRESS TESTING: CPT

## 2020-07-13 NOTE — HISTORY OF PRESENT ILLNESS
[FreeTextEntry1] : Mr. Hays is 79 year old male with a history of abnormal chest CT, acid reflux disease, allergic rhinitis, asthma, obstructive lung disease, CHILO and SOB presenting to the office today for a follow up visit. His chief complaint is stretching up or down without getting dizzy for feeling like he is about to fall.\par -his wife had recently fractured her tibial. \par -he has been doing 4 laps a day. \par -he has been doing everything he is suppose to. \par -reports loose bowel movements. \par -has been losing weight due to his wife not cooking as much. \par -has been walking without any limitations. \par -uses a walkers when he is out walking. \par -he is winded after his walk. \par -his walk is done at a decent pace. \par -he has been doing more walking in the house since his wife's fracture. \par -he isn't too winded. That SOB is only temporary.\par -if he reaches up or reaches down he feels dizzy/ feels like he is about to fall down. \par -he does not breath out when he is stretching. \par -He reports that He is not using any new medication, vitamins, or supplements. \par -He reports that He is taking their prescribed medications regularly. \par \par -He denies any chest pain, chest pressure, constipation, dysphagia, sour taste in the mouth, leg swelling, leg pain, itchy eyes, itchy ears, heartburn, reflux, myalgias or arthralgias.

## 2020-07-13 NOTE — PHYSICAL EXAM
[General Appearance - Well Developed] : well developed [Normal Appearance] : normal appearance [Well Groomed] : well groomed [No Deformities] : no deformities [General Appearance - Well Nourished] : well nourished [Normal Conjunctiva] : the conjunctiva exhibited no abnormalities [General Appearance - In No Acute Distress] : no acute distress [Eyelids - No Xanthelasma] : the eyelids demonstrated no xanthelasmas [Normal Oropharynx] : normal oropharynx [II] : II [Neck Appearance] : the appearance of the neck was normal [Neck Cervical Mass (___cm)] : no neck mass was observed [Jugular Venous Distention Increased] : there was no jugular-venous distention [Thyroid Diffuse Enlargement] : the thyroid was not enlarged [Heart Sounds] : normal S1 and S2 [Thyroid Nodule] : there were no palpable thyroid nodules [Heart Rate And Rhythm] : heart rate and rhythm were normal [Respiration, Rhythm And Depth] : normal respiratory rhythm and effort [Exaggerated Use Of Accessory Muscles For Inspiration] : no accessory muscle use [Auscultation Breath Sounds / Voice Sounds] : lungs were clear to auscultation bilaterally [Abdomen Soft] : soft [Kyphosis] : kyphosis [Abdomen Tenderness] : non-tender [Abdomen Mass (___ Cm)] : no abdominal mass palpated [Gait - Sufficient For Exercise Testing] : the gait was sufficient for exercise testing [Nail Clubbing] : no clubbing of the fingernails [Cyanosis, Localized] : no localized cyanosis [Petechial Hemorrhages (___cm)] : no petechial hemorrhages [] : no rash [Skin Color & Pigmentation] : normal skin color and pigmentation [No Venous Stasis] : no venous stasis [Skin Lesions] : no skin lesions [No Skin Ulcers] : no skin ulcer [No Xanthoma] : no  xanthoma was observed [Deep Tendon Reflexes (DTR)] : deep tendon reflexes were 2+ and symmetric [No Focal Deficits] : no focal deficits [Sensation] : the sensory exam was normal to light touch and pinprick [Oriented To Time, Place, And Person] : oriented to person, place, and time [Impaired Insight] : insight and judgment were intact [Affect] : the affect was normal [FreeTextEntry2] : 1+ LE edema  [FreeTextEntry1] : wheelchair bound

## 2020-07-13 NOTE — PROCEDURE
[FreeTextEntry1] : 6 minute walk test reveals a low saturation of 86% with moderate dyspnea or fatigue; walked 32.6 meters. Patient was unable to complete test. Placed on 2L O2. \par

## 2020-07-13 NOTE — ADDENDUM
[FreeTextEntry1] : Documented by Jairon Ledezma acting as a scribe for Dr. Renan Delgado on 07/13/2020 \par \par All medical record entries made by the Scribe were at my, Dr. Renan Delgado's, direction and personally dictated by me on 07/13/2020 . I have reviewed the chart and agree that the record accurately reflects my personal performance of the history, physical exam, assessment and plan. I have also personally directed, reviewed, and agree with the discharge instructions.

## 2020-07-13 NOTE — ASSESSMENT
[FreeTextEntry1] : Mr. 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 #1 issue is balance. \par \par His shortness of breath is multifactorial due to:\par -restrictive dysfunction\par -obesity\par -poor breathing mechanics/poor balance\par -cardiac disease\par -asthma\par \par problem 1: asthma / COPD (quiet) \par -continue Alvesco 160 2 puffs BID\par -continue to use Breo Ellipta 200 1 inhalation QD\par -followed by Spiriva 1 inhalation QD\par -continue Ventolin 2 puffs Q6H \par -Asthma is believed to be caused by inherited (genetic) and environmental factor, but its exact cause is unknown. Asthma may be triggered by allergens, lung infections, or irritants in the air. Asthma triggers are different for each person\par -Inhaler technique reviewed as well as oral hygiene techniques reviewed with patient. Avoidance of cold air, extremes of temperature, rescue inhaler should be used before exercise. Order of medication reviewed with patient. Recommended use of a cool mist humidifier in the bedroom. \par \par problem 2: allergic rhinitis\par -continue to use Astelin 1 sniff each nostril BID \par -continue Atrovent 6% nasal spray 1 inhalation each nostril TID \par -continue Clarinex 5 mg QAM\par \par -Environmental measures for allergies were encouraged including mattress and pillow cover, air purifier, and environmental controls.\par \par problem 3: GERD\par -continue to use Prilosec 40 mg before breakfast\par -Add Pepcid 40mg QHS \par \par -Rule of 2s: avoid eating too much, eating too late, eating too spicy, eating two hours before bed\par -Things to avoid including overeating, spicy foods, tight clothing, eating within three hours of bed, this list is not all inclusive. \par -For treatment of reflux, possible options discussed including diet control, H2 blockers, PPIs, as well as coating motility agents discussed as treatment options. Timing of meals and proximity of last meal to sleep were discussed. If symptoms persist, a formal gastrointestinal evaluation is needed. \par \par problem 4: CHILO\par -being recommended to use Oxy-Aid by [employer], or "chin strap"\par -Sleep apnea is associated with adverse clinical consequences which an affect most organ systems. Cardiovascular disease risk includes arrhythmias, atrial fibrillation, hypertension, coronary artery disease, and stroke. Metabolic disorders include diabetes type 2, non-alcoholic fatty liver disease. Mood disorder especially depression; and cognitive decline especially in the elderly. Associations with chronic reflux/Fajardo’s esophagus some but not all inclusive. \par -Reasons to assess this include arousal consistent with hypopnea; respiratory events most prominent in REM sleep or supine position; therefore sleep staging and body position are important for accurate diagnosis and estimation of AHI. \par \par problem 6: abnormal chest CT\par -follow up chest CT will be followed up - overdue\par \par problem 7: obesity\par -Weight loss, exercise, and diet control were discussed and are highly encouraged. Treatment options were given such as, aqua therapy, and contacting a nutritionist. Recommended to use the elliptical, stationary bike, less use of treadmill. Obesity is associated with worsening asthma, shortness of breath, and potential for cardiac disease, diabetes, and other underlying medical conditions. \par \par problem 8: bloating\par -recommended probiotic, and was prescribed Align \par \par problem 9: poor breathing mechanics\par -Proper breathing techniques were reviewed with an emphasis of exhalation. Patient instructed to breath in for 1 second and out for four seconds. Patient was encouraged to not talk while walking.\par \par Problem 10: Sciatica\par -Recommended to use "Lift-Me-Up" bed reclining appliance\par \par Problem 11: Health Maintenance/COVID19 Precautions:\par - Clean your hands often. Wash your hands often with soap and water for at least 20 seconds, especially after blowing your nose, coughing, or sneezing, or having been in a public place.\par - If soap and water are not available, use a hand  that contains at least 60% alcohol.\par - To the extent possible, avoid touching high-touch surfaces in public places - elevator buttons, door handles, handrails, handshaking with people, etc. Use a tissue or your sleeve to cover your hand or finger if you must touch something.\par - Wash your hands after touching surfaces in public places.\par - Avoid touching your face, nose, eyes, etc.\par - Clean and disinfect your home to remove germs: practice routine cleaning of frequently touched surfaces (for example: tables, doorknobs, light switches, handles, desks, toilets, faucets, sinks & cell phones)\par - Avoid crowds, especially in poorly ventilated spaces. Your risk of exposure to respiratory viruses like COVID-19 may increase in crowded, closed-in settings with little air circulation if\par there are people in the crowd who are sick. All patients are recommended to practice social distancing and stay at least 6 feet away from others.\par - Avoid all non-essential travel including plane trips, and especially avoid embarking on cruise ships.\par -If COVID-19 is spreading in your community, take extra measures to put distance between yourself and other people to further reduce your risk of being exposed to this new virus.\par -Stay home as much as possible.\par - Consider ways of getting food brought to your house through family, social, or commercial networks\par -Be aware that the virus has been known to live in the air up to 3 hours post exposure, cardboard up to 24 hours post exposure, copper up to 4 hours post exposure, steel and plastic up to 2-3 days post exposure. Risk of transmission from these surfaces are affected by many variables.\par \par Immune Support Recommendations:\par -OTC Vitamin C 500mg BID \par -OTC Quercetin 250-500mg BID \par -OTC Zinc 75-100mg per day \par -OTC Melatonin 1or 2mg a night \par -OTC Vitamin D 1-4000mg per day\par -Tonic Water 8oz\par -Recommended to Stay Hydrated (At least a gallon/day)\par \par Asthma and COVID19:\par You need to make sure your asthma is under control. This often requires the use of inhaled corticosteroids (and sometimes oral corticosteroids). Inhaled corticosteroids do not likely reduce your immune system’s ability to fight infections, but oral corticosteroids may. It is important to use the steps above to protect yourself to limit your exposure to any respiratory virus. \par \par problem 12 : health maintenance \par -recommended to try IB-Jordan\par -recommended influenza vaccination during flu season\par -s/p strep pneumonia vaccines: Prevnar-13 vaccine, followed by Pneumo vaccine 23 one year following\par -recommended early intervention for URIs\par -recommended regular osteoporosis evaluations\par -recommended early dermatological evaluations\par -recommended after the age of 50 to the age of 70, colonoscopy every 5 years \par \par F/U in 6 months with full PFTs\par He is encouraged to call with any changes, concerns, or questions.

## 2020-08-05 ENCOUNTER — APPOINTMENT (OUTPATIENT)
Dept: PULMONOLOGY | Facility: CLINIC | Age: 80
End: 2020-08-05

## 2020-08-23 ENCOUNTER — RX RENEWAL (OUTPATIENT)
Age: 80
End: 2020-08-23

## 2020-11-16 ENCOUNTER — RX RENEWAL (OUTPATIENT)
Age: 80
End: 2020-11-16

## 2021-01-01 ENCOUNTER — RX RENEWAL (OUTPATIENT)
Age: 81
End: 2021-01-01

## 2021-01-01 ENCOUNTER — APPOINTMENT (OUTPATIENT)
Dept: INTERNAL MEDICINE | Facility: CLINIC | Age: 81
End: 2021-01-01
Payer: MEDICARE

## 2021-01-01 PROCEDURE — 96372 THER/PROPH/DIAG INJ SC/IM: CPT

## 2021-01-01 RX ORDER — CYANOCOBALAMIN 1000 UG/ML
1000 INJECTION INTRAMUSCULAR; SUBCUTANEOUS
Qty: 0 | Refills: 0 | Status: COMPLETED | OUTPATIENT
Start: 2021-01-01

## 2021-01-13 ENCOUNTER — APPOINTMENT (OUTPATIENT)
Dept: PULMONOLOGY | Facility: CLINIC | Age: 81
End: 2021-01-13
Payer: MEDICARE

## 2021-01-13 VITALS
TEMPERATURE: 96.6 F | WEIGHT: 211 LBS | HEART RATE: 82 BPM | HEIGHT: 69 IN | DIASTOLIC BLOOD PRESSURE: 70 MMHG | RESPIRATION RATE: 16 BRPM | SYSTOLIC BLOOD PRESSURE: 120 MMHG | OXYGEN SATURATION: 94 % | BODY MASS INDEX: 31.25 KG/M2

## 2021-01-13 DIAGNOSIS — R09.82 POSTNASAL DRIP: ICD-10-CM

## 2021-01-13 PROCEDURE — 99214 OFFICE O/P EST MOD 30 MIN: CPT

## 2021-01-13 NOTE — PHYSICAL EXAM
[General Appearance - Well Developed] : well developed [Normal Appearance] : normal appearance [Well Groomed] : well groomed [General Appearance - Well Nourished] : well nourished [No Deformities] : no deformities [General Appearance - In No Acute Distress] : no acute distress [Normal Conjunctiva] : the conjunctiva exhibited no abnormalities [Eyelids - No Xanthelasma] : the eyelids demonstrated no xanthelasmas [Normal Oropharynx] : normal oropharynx [Neck Appearance] : the appearance of the neck was normal [Neck Cervical Mass (___cm)] : no neck mass was observed [Jugular Venous Distention Increased] : there was no jugular-venous distention [Thyroid Diffuse Enlargement] : the thyroid was not enlarged [Thyroid Nodule] : there were no palpable thyroid nodules [Heart Rate And Rhythm] : heart rate and rhythm were normal [Heart Sounds] : normal S1 and S2 [Respiration, Rhythm And Depth] : normal respiratory rhythm and effort [Exaggerated Use Of Accessory Muscles For Inspiration] : no accessory muscle use [Auscultation Breath Sounds / Voice Sounds] : lungs were clear to auscultation bilaterally [Kyphosis] : kyphosis [Abdomen Soft] : soft [Abdomen Tenderness] : non-tender [Abdomen Mass (___ Cm)] : no abdominal mass palpated [Gait - Sufficient For Exercise Testing] : the gait was sufficient for exercise testing [Nail Clubbing] : no clubbing of the fingernails [Cyanosis, Localized] : no localized cyanosis [Petechial Hemorrhages (___cm)] : no petechial hemorrhages [Skin Color & Pigmentation] : normal skin color and pigmentation [] : no rash [No Venous Stasis] : no venous stasis [Skin Lesions] : no skin lesions [No Skin Ulcers] : no skin ulcer [No Xanthoma] : no  xanthoma was observed [Deep Tendon Reflexes (DTR)] : deep tendon reflexes were 2+ and symmetric [Sensation] : the sensory exam was normal to light touch and pinprick [No Focal Deficits] : no focal deficits [Oriented To Time, Place, And Person] : oriented to person, place, and time [Affect] : the affect was normal [Impaired Insight] : insight and judgment were intact [III] : III [FreeTextEntry1] : wheelchair bound [FreeTextEntry2] : 1+ LE edema

## 2021-01-13 NOTE — ADDENDUM
[FreeTextEntry1] : Documented by Danisha Valenzuela acting as a scribe for Dr. Renan Delgado on 01/13/2021 \par \par All medical record entries made by the Scribe were at my, Dr. Renan Delgado's, direction and personally dictated by me on 01/13/2021 . I have reviewed the chart and agree that the record accurately reflects my personal performance of the history, physical exam, assessment and plan. I have also personally directed, reviewed, and agree with the discharge instructions.

## 2021-01-13 NOTE — HISTORY OF PRESENT ILLNESS
[FreeTextEntry1] : Mr. Hays is 80 year old male with a history of abnormal chest CT, acid reflux disease, allergic rhinitis, asthma, obstructive lung disease, CHILO and SOB presenting to the office today for a follow up visit. His chief complaint is \par - he has been good except for the neuropathy\par - the neuropathy has been affecting his hands and mostly in his feet \par - he is off Warfarin and on Eliquis and he needs a substitute for Spiriva because the insurance is no longer covering it. \par - he has been sleeping okay, he has been sleeping on a recliner. \par - no itchy eyes / ears \par - no sour taste in the mouth \par - no difficulty swallowing\par - he has been coughing a bit \par - his balance and his neuropathy are his biggest complaints. \par - He  denies any visual issues, headaches, nausea, vomiting, fever, chills, sweats, chest pains, chest pressure, diarrhea, constipation, dysphagia, myalgia, dizziness, leg swelling, leg pain, itchy eyes, itchy ears, heartburn, reflux, or sour taste in the mouth.

## 2021-01-13 NOTE — ASSESSMENT
[FreeTextEntry1] : Mr. Hays is a 80 year old male with a history of COPD / CAD, OSAS ([place]), parlayed diaphragm, HTN, DM,  - His number one issue is back sciatica. #1 issue is balance / neuropathy \par \par His shortness of breath is multifactorial due to:\par -restrictive dysfunction\par -obesity\par -poor breathing mechanics/poor balance\par -cardiac disease\par -asthma\par \par problem 1: asthma / COPD (quiet) \par -continue Alvesco 160 2 puffs BID\par -continue to use Breo Ellipta 200 1 inhalation QD\par -add Incruse Ellipta 1 puff QD \par -continue Ventolin 2 puffs Q6H \par -Asthma is believed to be caused by inherited (genetic) and environmental factor, but its exact cause is unknown. Asthma may be triggered by allergens, lung infections, or irritants in the air. Asthma triggers are different for each person\par -Inhaler technique reviewed as well as oral hygiene techniques reviewed with patient. Avoidance of cold air, extremes of temperature, rescue inhaler should be used before exercise. Order of medication reviewed with patient. Recommended use of a cool mist humidifier in the bedroom. \par \par problem 2: allergic rhinitis\par -continue to use Astelin 1 sniff each nostril BID \par -continue Atrovent 6% nasal spray 1 inhalation each nostril TID \par -continue Clarinex 5 mg QAM\par \par -Environmental measures for allergies were encouraged including mattress and pillow cover, air purifier, and environmental controls.\par \par problem 3: GERD\par -continue to use Prilosec 40 mg before breakfast\par -Add Pepcid 40mg QHS \par \par -Rule of 2s: avoid eating too much, eating too late, eating too spicy, eating two hours before bed\par -Things to avoid including overeating, spicy foods, tight clothing, eating within three hours of bed, this list is not all inclusive. \par -For treatment of reflux, possible options discussed including diet control, H2 blockers, PPIs, as well as coating motility agents discussed as treatment options. Timing of meals and proximity of last meal to sleep were discussed. If symptoms persist, a formal gastrointestinal evaluation is needed. \par \par problem 4: CHILO\par -being recommended to use Oxy-Aid by [employer], or "chin strap"\par -Sleep apnea is associated with adverse clinical consequences which an affect most organ systems. Cardiovascular disease risk includes arrhythmias, atrial fibrillation, hypertension, coronary artery disease, and stroke. Metabolic disorders include diabetes type 2, non-alcoholic fatty liver disease. Mood disorder especially depression; and cognitive decline especially in the elderly. Associations with chronic reflux/Fajardo’s esophagus some but not all inclusive. \par -Reasons to assess this include arousal consistent with hypopnea; respiratory events most prominent in REM sleep or supine position; therefore sleep staging and body position are important for accurate diagnosis and estimation of AHI. \par \par problem 6: abnormal chest CT\par -follow up chest CT will be followed up - overdue\par \par problem 7: obesity\par -Weight loss, exercise, and diet control were discussed and are highly encouraged. Treatment options were given such as, aqua therapy, and contacting a nutritionist. Recommended to use the elliptical, stationary bike, less use of treadmill. Obesity is associated with worsening asthma, shortness of breath, and potential for cardiac disease, diabetes, and other underlying medical conditions. \par \par problem 8: bloating\par -recommended probiotic, and was prescribed Align \par \par problem 9: poor breathing mechanics\par -Proper breathing techniques were reviewed with an emphasis of exhalation. Patient instructed to breath in for 1 second and out for four seconds. Patient was encouraged to not talk while walking.\par \par Problem 10: Sciatica\par -Recommended to use recliner \par \par Problem 11: Health Maintenance/COVID19 Precautions:\par - Clean your hands often. Wash your hands often with soap and water for at least 20 seconds, especially after blowing your nose, coughing, or sneezing, or having been in a public place.\par - If soap and water are not available, use a hand  that contains at least 60% alcohol.\par - To the extent possible, avoid touching high-touch surfaces in public places - elevator buttons, door handles, handrails, handshaking with people, etc. Use a tissue or your sleeve to cover your hand or finger if you must touch something.\par - Wash your hands after touching surfaces in public places.\par - Avoid touching your face, nose, eyes, etc.\par - Clean and disinfect your home to remove germs: practice routine cleaning of frequently touched surfaces (for example: tables, doorknobs, light switches, handles, desks, toilets, faucets, sinks & cell phones)\par - Avoid crowds, especially in poorly ventilated spaces. Your risk of exposure to respiratory viruses like COVID-19 may increase in crowded, closed-in settings with little air circulation if\par there are people in the crowd who are sick. All patients are recommended to practice social distancing and stay at least 6 feet away from others.\par - Avoid all non-essential travel including plane trips, and especially avoid embarking on cruise ships.\par -If COVID-19 is spreading in your community, take extra measures to put distance between yourself and other people to further reduce your risk of being exposed to this new virus.\par -Stay home as much as possible.\par - Consider ways of getting food brought to your house through family, social, or commercial networks\par -Be aware that the virus has been known to live in the air up to 3 hours post exposure, cardboard up to 24 hours post exposure, copper up to 4 hours post exposure, steel and plastic up to 2-3 days post exposure. Risk of transmission from these surfaces are affected by many variables.\par \par Immune Support Recommendations:\par -OTC Vitamin C 500mg BID \par -OTC Quercetin 250-500mg BID \par -OTC Zinc 75-100mg per day \par -OTC Melatonin 1or 2mg a night \par -OTC Vitamin D 1-4000mg per day\par -Tonic Water 8oz\par -Recommended to Stay Hydrated (At least a gallon/day)\par \par Asthma and COVID19:\par You need to make sure your asthma is under control. This often requires the use of inhaled corticosteroids (and sometimes oral corticosteroids). Inhaled corticosteroids do not likely reduce your immune system’s ability to fight infections, but oral corticosteroids may. It is important to use the steps above to protect yourself to limit your exposure to any respiratory virus. \par \par problem 12 : health maintenance \par - recommended Neuro renew for neuropathy\par - recommended Topricin cream \par -recommended to try IB-Jordan\par -s/p influenza vaccination - 2020 \par -s/p strep pneumonia vaccines: Prevnar-13 vaccine, followed by Pneumo vaccine 23 one year following (completed) \par -recommended early intervention for URIs\par -recommended regular osteoporosis evaluations\par -recommended early dermatological evaluations\par -recommended after the age of 50 to the age of 70, colonoscopy every 5 years \par \par F/U in 6 months with full PFTs\par He is encouraged to call with any changes, concerns, or questions.

## 2021-01-18 ENCOUNTER — RX RENEWAL (OUTPATIENT)
Age: 81
End: 2021-01-18

## 2021-01-19 ENCOUNTER — RX RENEWAL (OUTPATIENT)
Age: 81
End: 2021-01-19

## 2021-03-10 ENCOUNTER — RX RENEWAL (OUTPATIENT)
Age: 81
End: 2021-03-10

## 2021-06-03 ENCOUNTER — APPOINTMENT (OUTPATIENT)
Dept: INTERNAL MEDICINE | Facility: CLINIC | Age: 81
End: 2021-06-03
Payer: MEDICARE

## 2021-06-03 VITALS
HEART RATE: 92 BPM | TEMPERATURE: 97.2 F | SYSTOLIC BLOOD PRESSURE: 120 MMHG | OXYGEN SATURATION: 97 % | DIASTOLIC BLOOD PRESSURE: 70 MMHG

## 2021-06-03 DIAGNOSIS — Z97.2 PRESENCE OF DENTAL PROSTHETIC DEVICE (COMPLETE) (PARTIAL): ICD-10-CM

## 2021-06-03 DIAGNOSIS — Z83.3 FAMILY HISTORY OF DIABETES MELLITUS: ICD-10-CM

## 2021-06-03 DIAGNOSIS — Z97.4 PRESENCE OF EXTERNAL HEARING-AID: ICD-10-CM

## 2021-06-03 DIAGNOSIS — Z78.9 OTHER SPECIFIED HEALTH STATUS: ICD-10-CM

## 2021-06-03 DIAGNOSIS — Z87.09 PERSONAL HISTORY OF OTHER DISEASES OF THE RESPIRATORY SYSTEM: ICD-10-CM

## 2021-06-03 DIAGNOSIS — Z86.19 PERSONAL HISTORY OF OTHER INFECTIOUS AND PARASITIC DISEASES: ICD-10-CM

## 2021-06-03 DIAGNOSIS — G47.00 INSOMNIA, UNSPECIFIED: ICD-10-CM

## 2021-06-03 DIAGNOSIS — Z92.29 PERSONAL HISTORY OF OTHER DRUG THERAPY: ICD-10-CM

## 2021-06-03 DIAGNOSIS — Z86.79 PERSONAL HISTORY OF OTHER DISEASES OF THE CIRCULATORY SYSTEM: ICD-10-CM

## 2021-06-03 DIAGNOSIS — Z97.3 PRESENCE OF SPECTACLES AND CONTACT LENSES: ICD-10-CM

## 2021-06-03 DIAGNOSIS — I25.2 OLD MYOCARDIAL INFARCTION: ICD-10-CM

## 2021-06-03 DIAGNOSIS — Z80.3 FAMILY HISTORY OF MALIGNANT NEOPLASM OF BREAST: ICD-10-CM

## 2021-06-03 DIAGNOSIS — Z87.01 PERSONAL HISTORY OF PNEUMONIA (RECURRENT): ICD-10-CM

## 2021-06-03 DIAGNOSIS — Z87.438 PERSONAL HISTORY OF OTHER DISEASES OF MALE GENITAL ORGANS: ICD-10-CM

## 2021-06-03 DIAGNOSIS — Z71.89 OTHER SPECIFIED COUNSELING: ICD-10-CM

## 2021-06-03 DIAGNOSIS — Z86.718 PERSONAL HISTORY OF OTHER VENOUS THROMBOSIS AND EMBOLISM: ICD-10-CM

## 2021-06-03 DIAGNOSIS — M19.90 UNSPECIFIED OSTEOARTHRITIS, UNSPECIFIED SITE: ICD-10-CM

## 2021-06-03 DIAGNOSIS — K59.09 OTHER CONSTIPATION: ICD-10-CM

## 2021-06-03 DIAGNOSIS — Z85.01 PERSONAL HISTORY OF MALIGNANT NEOPLASM OF ESOPHAGUS: ICD-10-CM

## 2021-06-03 PROCEDURE — 36415 COLL VENOUS BLD VENIPUNCTURE: CPT

## 2021-06-03 PROCEDURE — G0439: CPT

## 2021-06-03 PROCEDURE — G0444 DEPRESSION SCREEN ANNUAL: CPT | Mod: 59

## 2021-06-03 RX ORDER — IPRATROPIUM BROMIDE AND ALBUTEROL SULFATE 2.5; .5 MG/3ML; MG/3ML
0.5-2.5 (3) SOLUTION RESPIRATORY (INHALATION) 4 TIMES DAILY
Qty: 120 | Refills: 3 | Status: DISCONTINUED | COMMUNITY
Start: 2019-05-23 | End: 2021-06-03

## 2021-06-03 RX ORDER — PREDNISONE 10 MG/1
10 TABLET ORAL
Qty: 60 | Refills: 0 | Status: DISCONTINUED | COMMUNITY
Start: 2020-01-22 | End: 2021-06-03

## 2021-06-03 RX ORDER — MONTELUKAST 10 MG/1
10 TABLET, FILM COATED ORAL
Qty: 90 | Refills: 3 | Status: DISCONTINUED | COMMUNITY
Start: 2020-01-06 | End: 2021-06-03

## 2021-06-03 RX ORDER — WARFARIN 5 MG/1
5 TABLET ORAL
Qty: 90 | Refills: 0 | Status: DISCONTINUED | COMMUNITY
Start: 2016-11-07 | End: 2021-06-03

## 2021-06-03 RX ORDER — TELMISARTAN 20 MG/1
20 TABLET ORAL
Refills: 0 | Status: ACTIVE | COMMUNITY

## 2021-06-03 RX ORDER — ASPIRIN 81 MG
81 TABLET, DELAYED RELEASE (ENTERIC COATED) ORAL
Refills: 0 | Status: ACTIVE | COMMUNITY

## 2021-06-03 RX ORDER — ISOSORBIDE MONONITRATE 30 MG/1
30 TABLET, EXTENDED RELEASE ORAL
Qty: 90 | Refills: 0 | Status: DISCONTINUED | COMMUNITY
Start: 2016-10-17 | End: 2021-06-03

## 2021-06-03 RX ORDER — ALBUTEROL SULFATE 90 UG/1
108 (90 BASE) AEROSOL, METERED RESPIRATORY (INHALATION) EVERY 6 HOURS
Qty: 3 | Refills: 1 | Status: DISCONTINUED | COMMUNITY
Start: 2019-01-07 | End: 2021-06-03

## 2021-06-03 RX ORDER — CYCLOBENZAPRINE HYDROCHLORIDE 5 MG/1
5 TABLET, FILM COATED ORAL 3 TIMES DAILY
Qty: 30 | Refills: 0 | Status: ACTIVE | COMMUNITY
Start: 2021-06-03 | End: 1900-01-01

## 2021-06-03 RX ORDER — FLUTICASONE FUROATE AND VILANTEROL TRIFENATATE 200; 25 UG/1; UG/1
200-25 POWDER RESPIRATORY (INHALATION) DAILY
Qty: 3 | Refills: 1 | Status: DISCONTINUED | COMMUNITY
Start: 2017-04-12 | End: 2021-06-03

## 2021-06-03 RX ORDER — APIXABAN 5 MG/1
5 TABLET, FILM COATED ORAL
Refills: 0 | Status: ACTIVE | COMMUNITY

## 2021-06-03 RX ORDER — CLOTRIMAZOLE AND BETAMETHASONE DIPROPIONATE 10; .5 MG/G; MG/G
1-0.05 CREAM TOPICAL TWICE DAILY
Qty: 1 | Refills: 1 | Status: DISCONTINUED | COMMUNITY
Start: 2018-03-01 | End: 2021-06-03

## 2021-06-03 RX ORDER — BENZONATATE 200 MG/1
200 CAPSULE ORAL 3 TIMES DAILY
Qty: 60 | Refills: 0 | Status: DISCONTINUED | COMMUNITY
Start: 2019-05-23 | End: 2021-06-03

## 2021-06-03 RX ORDER — DESLORATADINE 5 MG/1
5 TABLET ORAL DAILY
Qty: 90 | Refills: 1 | Status: DISCONTINUED | COMMUNITY
Start: 2020-01-06 | End: 2021-06-03

## 2021-06-03 RX ORDER — CLOPIDOGREL BISULFATE 75 MG/1
75 TABLET, FILM COATED ORAL
Qty: 90 | Refills: 0 | Status: DISCONTINUED | COMMUNITY
Start: 2016-10-17 | End: 2021-06-03

## 2021-06-03 RX ORDER — OMEGA-3-ACID ETHYL ESTERS CAPSULES 1 G/1
1 CAPSULE, LIQUID FILLED ORAL
Qty: 270 | Refills: 0 | Status: DISCONTINUED | COMMUNITY
Start: 2016-10-17 | End: 2021-06-03

## 2021-06-03 RX ORDER — VALSARTAN 80 MG/1
80 TABLET, COATED ORAL
Qty: 90 | Refills: 0 | Status: DISCONTINUED | COMMUNITY
Start: 2016-11-08 | End: 2021-06-03

## 2021-06-04 DIAGNOSIS — Z23 ENCOUNTER FOR IMMUNIZATION: ICD-10-CM

## 2021-06-04 DIAGNOSIS — E53.8 DEFICIENCY OF OTHER SPECIFIED B GROUP VITAMINS: ICD-10-CM

## 2021-06-04 PROBLEM — Z85.01 HISTORY OF ESOPHAGEAL CANCER: Status: ACTIVE | Noted: 2021-06-04

## 2021-06-04 PROBLEM — I25.2 HISTORY OF MYOCARDIAL INFARCTION: Status: ACTIVE | Noted: 2021-06-04

## 2021-06-04 PROBLEM — K59.09 CHRONIC CONSTIPATION: Status: ACTIVE | Noted: 2021-06-04

## 2021-06-04 PROBLEM — Z97.3 WEARS READING EYEGLASSES: Status: ACTIVE | Noted: 2021-06-04

## 2021-06-04 PROBLEM — Z97.2 WEARS PARTIAL DENTURES: Status: ACTIVE | Noted: 2021-06-04

## 2021-06-04 PROBLEM — G47.00 INSOMNIA, UNSPECIFIED TYPE: Status: ACTIVE | Noted: 2021-06-04

## 2021-06-04 PROBLEM — Z86.718 HISTORY OF DEEP VENOUS THROMBOSIS: Status: RESOLVED | Noted: 2021-06-04 | Resolved: 2021-06-04

## 2021-06-04 PROBLEM — Z92.29 HISTORY OF DRUG THERAPY: Status: RESOLVED | Noted: 2019-05-23 | Resolved: 2021-06-04

## 2021-06-04 PROBLEM — Z87.01 HISTORY OF PNEUMONIA: Status: ACTIVE | Noted: 2021-06-04

## 2021-06-04 PROBLEM — Z80.3 FAMILY HISTORY OF MALIGNANT NEOPLASM OF BREAST: Status: ACTIVE | Noted: 2021-06-04

## 2021-06-04 PROBLEM — M19.90 ARTHRITIS: Status: ACTIVE | Noted: 2021-06-04

## 2021-06-04 PROBLEM — Z86.19 HISTORY OF SHINGLES: Status: ACTIVE | Noted: 2021-06-04

## 2021-06-04 PROBLEM — Z87.438 HISTORY OF EPIDIDYMITIS: Status: RESOLVED | Noted: 2018-03-01 | Resolved: 2021-06-04

## 2021-06-04 PROBLEM — Z87.09 HISTORY OF BRONCHITIS: Status: ACTIVE | Noted: 2021-06-04

## 2021-06-04 PROBLEM — Z86.79 HISTORY OF ATRIAL FIBRILLATION: Status: ACTIVE | Noted: 2021-06-04

## 2021-06-04 PROBLEM — Z78.9 ALCOHOL INGESTION: Status: ACTIVE | Noted: 2021-06-04

## 2021-06-04 PROBLEM — Z71.89 EDUCATED ABOUT COVID-19 VIRUS INFECTION: Status: RESOLVED | Noted: 2020-07-13 | Resolved: 2021-06-04

## 2021-06-04 PROBLEM — Z97.4 WEARS HEARING AID: Status: ACTIVE | Noted: 2021-06-04

## 2021-06-04 LAB
25(OH)D3 SERPL-MCNC: 15.2 NG/ML
ALBUMIN SERPL ELPH-MCNC: 4.2 G/DL
ALP BLD-CCNC: 130 U/L
ALT SERPL-CCNC: 10 U/L
ANION GAP SERPL CALC-SCNC: 16 MMOL/L
AST SERPL-CCNC: 16 U/L
BASOPHILS # BLD AUTO: 0.06 K/UL
BASOPHILS NFR BLD AUTO: 0.8 %
BILIRUB SERPL-MCNC: 0.4 MG/DL
BUN SERPL-MCNC: 12 MG/DL
CALCIUM SERPL-MCNC: 10.5 MG/DL
CHLORIDE SERPL-SCNC: 98 MMOL/L
CHOLEST SERPL-MCNC: 131 MG/DL
CO2 SERPL-SCNC: 22 MMOL/L
COVID-19 NUCLEOCAPSID  GAM ANTIBODY INTERPRETATION: NEGATIVE
COVID-19 SPIKE DOMAIN ANTIBODY INTERPRETATION: POSITIVE
CREAT SERPL-MCNC: 0.63 MG/DL
EOSINOPHIL # BLD AUTO: 0.02 K/UL
EOSINOPHIL NFR BLD AUTO: 0.3 %
ESTIMATED AVERAGE GLUCOSE: 120 MG/DL
FOLATE SERPL-MCNC: 5.4 NG/ML
GLUCOSE SERPL-MCNC: 177 MG/DL
HBA1C MFR BLD HPLC: 5.8 %
HCT VFR BLD CALC: 47.9 %
HDLC SERPL-MCNC: 59 MG/DL
HGB BLD-MCNC: 14.6 G/DL
IMM GRANULOCYTES NFR BLD AUTO: 0.6 %
IRON SERPL-MCNC: 59 UG/DL
LDLC SERPL CALC-MCNC: 43 MG/DL
LYMPHOCYTES # BLD AUTO: 1.09 K/UL
LYMPHOCYTES NFR BLD AUTO: 14.1 %
MAN DIFF?: NORMAL
MCHC RBC-ENTMCNC: 28.2 PG
MCHC RBC-ENTMCNC: 30.5 GM/DL
MCV RBC AUTO: 92.5 FL
MONOCYTES # BLD AUTO: 0.59 K/UL
MONOCYTES NFR BLD AUTO: 7.6 %
NEUTROPHILS # BLD AUTO: 5.91 K/UL
NEUTROPHILS NFR BLD AUTO: 76.6 %
NONHDLC SERPL-MCNC: 71 MG/DL
PLATELET # BLD AUTO: 344 K/UL
POTASSIUM SERPL-SCNC: 5.1 MMOL/L
PROT SERPL-MCNC: 6.6 G/DL
PSA SERPL-MCNC: 0.89 NG/ML
RBC # BLD: 5.18 M/UL
RBC # FLD: 14.2 %
SARS-COV-2 AB SERPL IA-ACNC: 126 U/ML
SARS-COV-2 AB SERPL QL IA: 0.1 INDEX
SODIUM SERPL-SCNC: 136 MMOL/L
TRIGL SERPL-MCNC: 142 MG/DL
TSH SERPL-ACNC: 0.86 UIU/ML
VIT B12 SERPL-MCNC: 160 PG/ML
WBC # FLD AUTO: 7.72 K/UL

## 2021-06-04 NOTE — HEALTH RISK ASSESSMENT
[Fair] :  ~his/her~ mood as fair [26-29] : 26-10 [Yes] : Yes [Monthly or less (1 pt)] : Monthly or less (1 point) [1 or 2 (0 pts)] : 1 or 2 (0 points) [Never (0 pts)] : Never (0 points) [No] : In the past 12 months have you used drugs other than those required for medical reasons? No [No falls in past year] : Patient reported no falls in the past year [1] : 2) Feeling down, depressed, or hopeless for several days (1) [No Retinopathy] : No retinopathy [HIV test declined] : HIV test declined [Hepatitis C test declined] : Hepatitis C test declined [None] : None [With Family] : lives with family [# of Members in Household ___] :  household currently consist of [unfilled] member(s) [Retired] : retired [Graduate School] : graduate school [] :  [# Of Children ___] : has [unfilled] children [Feels Safe at Home] : Feels safe at home [Smoke Detector] : smoke detector [Carbon Monoxide Detector] : carbon monoxide detector [Seat Belt] :  uses seat belt [Sunscreen] : uses sunscreen [With Patient/Caregiver] : With Patient/Caregiver [Designated Healthcare Proxy] : Designated healthcare proxy [Name: ___] : Health Care Proxy's Name: [unfilled]  [Relationship: ___] : Relationship: [unfilled] [FreeTextEntry1] : back pain, constipation, insomnia, neuropathy, anxiety [] : No [de-identified] : cardiology, pulmonary, ophtho, endocrine, dentist, podiatry, neuro, GI [YearQuit] : 1981 [Audit-CScore] : 1 [de-identified] : CBD [de-identified] : PT [de-identified] : Regular [WBG1Niszb] : 2 [VLC9Juzdg] : 6 [EyeExamDate] : 10/20 [Change in mental status noted] : No change in mental status noted [Reports changes in hearing] : Reports no changes in hearing [Reports changes in vision] : Reports no changes in vision [Reports changes in dental health] : Reports no changes in dental health [Guns at Home] : no guns at home [Travel to Developing Areas] : does not  travel to developing areas [TB Exposure] : is not being exposed to tuberculosis [Caregiver Concerns] : does not have caregiver concerns [ColonoscopyDate] : 2019 [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

## 2021-06-04 NOTE — REVIEW OF SYSTEMS
[Fatigue] : fatigue [Vision Problems] : vision problems [Hearing Loss] : hearing loss [Postnasal Drip] : postnasal drip [Shortness Of Breath] : shortness of breath [Constipation] : constipation [Heartburn] : heartburn [Back Pain] : back pain [Unsteady Walk] : ataxia [Insomnia] : insomnia [Anxiety] : anxiety [Negative] : Heme/Lymph

## 2021-06-04 NOTE — PHYSICAL EXAM
[No Acute Distress] : no acute distress [Well Nourished] : well nourished [Well Developed] : well developed [Well-Appearing] : well-appearing [Normal Voice/Communication] : normal voice/communication [Normal Sclera/Conjunctiva] : normal sclera/conjunctiva [PERRL] : pupils equal round and reactive to light [EOMI] : extraocular movements intact [Normal Outer Ear/Nose] : the outer ears and nose were normal in appearance [No JVD] : no jugular venous distention [No Lymphadenopathy] : no lymphadenopathy [Supple] : supple [No Respiratory Distress] : no respiratory distress  [No Accessory Muscle Use] : no accessory muscle use [Clear to Auscultation] : lungs were clear to auscultation bilaterally [Normal Rate] : normal rate  [Normal S1, S2] : normal S1 and S2 [No Carotid Bruits] : no carotid bruits [No Varicosities] : no varicosities [Pedal Pulses Present] : the pedal pulses are present [No Edema] : there was no peripheral edema [No Extremity Clubbing/Cyanosis] : no extremity clubbing/cyanosis [Declined Breast Exam] : declined breast exam  [Soft] : abdomen soft [Normal Bowel Sounds] : normal bowel sounds [Declined Rectal Exam] : declined rectal exam [Normal Supraclavicular Nodes] : no supraclavicular lymphadenopathy [Normal Posterior Cervical Nodes] : no posterior cervical lymphadenopathy [Normal Anterior Cervical Nodes] : no anterior cervical lymphadenopathy [No CVA Tenderness] : no CVA  tenderness [No Spinal Tenderness] : no spinal tenderness [Kyphosis] : kyphosis [No Rash] : no rash [No Focal Deficits] : no focal deficits [Speech Grossly Normal] : speech grossly normal [Normal Affect] : the affect was normal [Alert and Oriented x3] : oriented to person, place, and time [de-identified] : Examined in wheelchair as he refuses to move to exam table for exam [de-identified] : No sinus tenderness; wearing full facemask; wearing bilateral hearing aids [de-identified] : No stridor or thyromegaly [de-identified] : R = 16; good air entry with no wheezing [de-identified] : Irregularly irregular = AF [de-identified] : No cords [de-identified] : As per urology

## 2021-06-04 NOTE — HISTORY OF PRESENT ILLNESS
[FreeTextEntry1] : Comes in for annual physical. [de-identified] : Has acute LBP for 2 weeks since doing back exercises with weights.\par Denies covid 19 illness.

## 2021-06-04 NOTE — ASSESSMENT
[FreeTextEntry1] : See health maintenance assessment and plan outlined below.\par In addition:\par Full labs and urine testing sent today = blood drawn here in office\par Podiatry follow-up 2021\par Spinal orthopedics evaluation for back pain advised\par To do x-ray of LS spine///may need MR imaging\par He declines opioid therapy\par He declines Medrol pack\par Can use Flexeril as needed for muscle spasm\par Apply heat/apply topical diclofenac gel/apply lidocaine patch\par GI follow-up with Dr. Fournier 2021\par Had colonoscopy 2019\par Urology follow-up 2021\par Continue medications, diet, exercise as outlined\par He declined EKG here today\par Cardiology follow-up 2021\par He does chest imaging and PFTs with pulmonary\par Pulmonary follow-up 2021\par Endocrine follow-up 2021\par Vaccines reviewed\par ENT follow-up 2021\par Dermatology follow-up 2021\par Dental follow-up 2021\par Ophthalmology follow-up 2021\par Neurology follow-up 2021\par Psych follow-up 2021\par He will return in follow-up for an annual physical\par He will return in follow-up in 3 to 4 months with labs and as needed\par He will call if his status worsens or for any medical issues\par All of his questions were answered\par All of the above was discussed in detail with the patient and his daughter\par They verbally confirmed understanding of all of the above and agreement with the above plan

## 2021-06-07 ENCOUNTER — RX RENEWAL (OUTPATIENT)
Age: 81
End: 2021-06-07

## 2021-06-22 ENCOUNTER — APPOINTMENT (OUTPATIENT)
Dept: INTERNAL MEDICINE | Facility: CLINIC | Age: 81
End: 2021-06-22
Payer: MEDICARE

## 2021-06-22 PROCEDURE — 96372 THER/PROPH/DIAG INJ SC/IM: CPT

## 2021-06-22 RX ORDER — CYANOCOBALAMIN 1000 UG/ML
1000 INJECTION INTRAMUSCULAR; SUBCUTANEOUS
Qty: 0 | Refills: 0 | Status: COMPLETED | OUTPATIENT
Start: 2021-06-22

## 2021-07-15 ENCOUNTER — APPOINTMENT (OUTPATIENT)
Dept: PULMONOLOGY | Facility: CLINIC | Age: 81
End: 2021-07-15
Payer: MEDICARE

## 2021-07-15 VITALS
DIASTOLIC BLOOD PRESSURE: 60 MMHG | HEART RATE: 84 BPM | WEIGHT: 205 LBS | SYSTOLIC BLOOD PRESSURE: 104 MMHG | BODY MASS INDEX: 31.07 KG/M2 | OXYGEN SATURATION: 96 % | HEIGHT: 68 IN | TEMPERATURE: 98.4 F | RESPIRATION RATE: 16 BRPM

## 2021-07-15 DIAGNOSIS — G47.33 OBSTRUCTIVE SLEEP APNEA (ADULT) (PEDIATRIC): ICD-10-CM

## 2021-07-15 PROCEDURE — ZZZZZ: CPT

## 2021-07-15 PROCEDURE — 99214 OFFICE O/P EST MOD 30 MIN: CPT | Mod: 25

## 2021-07-15 NOTE — ASSESSMENT
[FreeTextEntry1] : Mr. Hays is a 80 year old male with a history of COPD / CAD, OSAS ([place]), parlayed diaphragm, HTN, DM,  - His number one issue is back sciatica. #1 issue is balance / neuropathy / anxiety\par \par His shortness of breath is multifactorial due to:\par -restrictive dysfunction\par -obesity\par -poor breathing mechanics/poor balance\par -cardiac disease\par -asthma\par \par problem 1: asthma / COPD (quiet) \par -continue Alvesco 160 2 puffs BID\par -continue to use Breo Ellipta 200 1 inhalation QD\par -continue Incruse Ellipta 1 puff QD \par -continue Ventolin 2 puffs Q6H \par -Asthma is believed to be caused by inherited (genetic) and environmental factor, but its exact cause is unknown. Asthma may be triggered by allergens, lung infections, or irritants in the air. Asthma triggers are different for each person\par -Inhaler technique reviewed as well as oral hygiene techniques reviewed with patient. Avoidance of cold air, extremes of temperature, rescue inhaler should be used before exercise. Order of medication reviewed with patient. Recommended use of a cool mist humidifier in the bedroom. \par \par problem 2: allergic rhinitis (stable)\par -continue to use Astelin 1 sniff each nostril BID \par -continue Atrovent 6% nasal spray 1 inhalation each nostril TID \par -continue Clarinex 5 mg QAM\par -Environmental measures for allergies were encouraged including mattress and pillow cover, air purifier, and environmental controls.\par \par problem 3: GERD\par -continue to use Prilosec 40 mg before breakfast\par -continue Pepcid 40mg QHS \par -Rule of 2s: avoid eating too much, eating too late, eating too spicy, eating two hours before bed.\par - Things to avoid including overeating, spicy foods, tight clothing, eating within two hours of bed, this list is not all inclusive.\par - For treatments of reflux, possible options discussed including diet control, H2 blockers, PPIs, as well as coating motility agents discussed as treatment options. Timing of meals and proximity of last meal to sleep were discussed. If symptoms persist, a formal gastrointestinal evaluation is needed. \par \par problem 4: CHILO\par -being recommended to use Oxy-Aid by [employer], or "chin strap"\par -Sleep apnea is associated with adverse clinical consequences which an affect most organ systems. Cardiovascular disease risk includes arrhythmias, atrial fibrillation, hypertension, coronary artery disease, and stroke. Metabolic disorders include diabetes type 2, non-alcoholic fatty liver disease. Mood disorder especially depression; and cognitive decline especially in the elderly. Associations with chronic reflux/Fajardo’s esophagus some but not all inclusive. \par -Reasons to assess this include arousal consistent with hypopnea; respiratory events most prominent in REM sleep or supine position; therefore sleep staging and body position are important for accurate diagnosis and estimation of AHI. \par \par problem 6: abnormal chest CT\par -follow up chest CT will be followed up - overdue\par \par problem 7: obesity\par -Weight loss, exercise, and diet control were discussed and are highly encouraged. Treatment options were given such as, aqua therapy, and contacting a nutritionist. Recommended to use the elliptical, stationary bike, less use of treadmill. Obesity is associated with worsening asthma, shortness of breath, and potential for cardiac disease, diabetes, and other underlying medical conditions. \par \par problem 8: bloating\par -recommended probiotic, and was prescribed Align \par \par problem 9: poor breathing mechanics\par -Proper breathing techniques were reviewed with an emphasis of exhalation. Patient instructed to breath in for 1 second and out for four seconds. Patient was encouraged to not talk while walking.\par \par Problem 10: Sciatica\par -Recommended to use recliner \par \par Problem 11: Health Maintenance/COVID19 Precautions:\par - S/p Covid 19 vaccine (Moderna) x2 \par -At-home stretching exercises were discussed and demonstrated with the patient. \par - Clean your hands often. Wash your hands often with soap and water for at least 20 seconds, especially after blowing your nose, coughing, or sneezing, or having been in a public place.\par - If soap and water are not available, use a hand  that contains at least 60% alcohol.\par - To the extent possible, avoid touching high-touch surfaces in public places - elevator buttons, door handles, handrails, handshaking with people, etc. Use a tissue or your sleeve to cover your hand or finger if you must touch something.\par - Wash your hands after touching surfaces in public places.\par - Avoid touching your face, nose, eyes, etc.\par - Clean and disinfect your home to remove germs: practice routine cleaning of frequently touched surfaces (for example: tables, doorknobs, light switches, handles, desks, toilets, faucets, sinks & cell phones)\par - Avoid crowds, especially in poorly ventilated spaces. Your risk of exposure to respiratory viruses like COVID-19 may increase in crowded, closed-in settings with little air circulation if\par there are people in the crowd who are sick. All patients are recommended to practice social distancing and stay at least 6 feet away from others.\par - Avoid all non-essential travel including plane trips, and especially avoid embarking on cruise ships.\par -If COVID-19 is spreading in your community, take extra measures to put distance between yourself and other people to further reduce your risk of being exposed to this new virus.\par -Stay home as much as possible.\par - Consider ways of getting food brought to your house through family, social, or commercial networks\par -Be aware that the virus has been known to live in the air up to 3 hours post exposure, cardboard up to 24 hours post exposure, copper up to 4 hours post exposure, steel and plastic up to 2-3 days post exposure. Risk of transmission from these surfaces are affected by many variables.\par \par Immune Support Recommendations:\par -OTC Vitamin C 500mg BID \par -OTC Quercetin 250-500mg BID \par -OTC Zinc 75-100mg per day \par -OTC Melatonin 1or 2mg a night \par -OTC Vitamin D 1-4000mg per day\par -Tonic Water 8oz\par -Recommended to Stay Hydrated (At least a gallon/day)\par \par Asthma and COVID19:\par You need to make sure your asthma is under control. This often requires the use of inhaled corticosteroids (and sometimes oral corticosteroids). Inhaled corticosteroids do not likely reduce your immune system’s ability to fight infections, but oral corticosteroids may. It is important to use the steps above to protect yourself to limit your exposure to any respiratory virus. \par \par problem 12 : health maintenance \par - recommended Neuro renew for neuropathy\par - recommended Topricin cream \par -recommended to try IB-Jordan\par -s/p influenza vaccination - 2020 \par -s/p strep pneumonia vaccines: Prevnar-13 vaccine, followed by Pneumo vaccine 23 one year following (completed) \par -recommended early intervention for URIs\par -recommended regular osteoporosis evaluations\par -recommended early dermatological evaluations\par -recommended after the age of 50 to the age of 70, colonoscopy every 5 years \par \par F/U in 6 months with full PFTs\par He is encouraged to call with any changes, concerns, or questions.

## 2021-07-15 NOTE — PROCEDURE
[FreeTextEntry1] : Full PFT revealed mld restrictive dysfunction, with a FEV1 of 1.64 L, which is 59 % of predicted, moderately reduced lung volumes, and a moderately reduced diffusion of 10.0, which is 50% of predicted, with a normal flow volume loop.\par \par FENO was unable to be completed by patient ; a normal value being less than 25\par Fractional exhaled nitric oxide (FENO) is regarded as a simple, noninvasive method for assessing eosinophilic airway inflammation. Produced by a variety of cells within the lung, nitric oxide (NO) concentrations are generally low in healthy individuals. However, high concentrations of NO appear to be involved in nonspecific host defense mechanisms and chronic inflammatory diseases such as asthma. The American Thoracic Society (ATS) therefore has recommended using FENO to aid in the diagnosis and monitoring of eosinophilic airway inflammation and asthma, and for identifying steroid responsive individuals whose chronic respiratory symptoms may be caused by airway inflammation.

## 2021-07-15 NOTE — PHYSICAL EXAM
[General Appearance - Well Developed] : well developed [Well Groomed] : well groomed [General Appearance - Well Nourished] : well nourished [No Deformities] : no deformities [General Appearance - In No Acute Distress] : no acute distress [Normal Conjunctiva] : the conjunctiva exhibited no abnormalities [Eyelids - No Xanthelasma] : the eyelids demonstrated no xanthelasmas [Neck Appearance] : the appearance of the neck was normal [Neck Cervical Mass (___cm)] : no neck mass was observed [Jugular Venous Distention Increased] : there was no jugular-venous distention [Thyroid Diffuse Enlargement] : the thyroid was not enlarged [Thyroid Nodule] : there were no palpable thyroid nodules [Heart Rate And Rhythm] : heart rate and rhythm were normal [Heart Sounds] : normal S1 and S2 [Respiration, Rhythm And Depth] : normal respiratory rhythm and effort [Exaggerated Use Of Accessory Muscles For Inspiration] : no accessory muscle use [Auscultation Breath Sounds / Voice Sounds] : lungs were clear to auscultation bilaterally [Abdomen Soft] : soft [Abdomen Tenderness] : non-tender [Abdomen Mass (___ Cm)] : no abdominal mass palpated [Gait - Sufficient For Exercise Testing] : the gait was sufficient for exercise testing [Nail Clubbing] : no clubbing of the fingernails [Cyanosis, Localized] : no localized cyanosis [Petechial Hemorrhages (___cm)] : no petechial hemorrhages [Skin Color & Pigmentation] : normal skin color and pigmentation [] : no rash [No Venous Stasis] : no venous stasis [Skin Lesions] : no skin lesions [No Skin Ulcers] : no skin ulcer [No Xanthoma] : no  xanthoma was observed [Deep Tendon Reflexes (DTR)] : deep tendon reflexes were 2+ and symmetric [Sensation] : the sensory exam was normal to light touch and pinprick [Oriented To Time, Place, And Person] : oriented to person, place, and time [Impaired Insight] : insight and judgment were intact [Affect] : the affect was normal [No Acute Distress] : no acute distress [Normal Oropharynx] : normal oropharynx [III] : Mallampati Class: III [Normal Appearance] : normal appearance [No Neck Mass] : no neck mass [Normal Rate/Rhythm] : normal rate/rhythm [Normal S1, S2] : normal s1, s2 [No Murmurs] : no murmurs [No Resp Distress] : no resp distress [Clear to Auscultation Bilaterally] : clear to auscultation bilaterally [No Abnormalities] : no abnormalities [Kyphosis] : kyphosis [Benign] : benign [Normal Gait] : normal gait [No Clubbing] : no clubbing [No Cyanosis] : no cyanosis [FROM] : FROM [Normal Color/ Pigmentation] : normal color/ pigmentation [No Focal Deficits] : no focal deficits [Oriented x3] : oriented x3 [Normal Affect] : normal affect [TextBox_68] : I:E 1:3; Clear  [TextBox_105] : trace edema [FreeTextEntry1] : trace edema

## 2021-07-15 NOTE — REASON FOR VISIT
[Follow-Up] : a follow-up visit [Family Member] : family member [FreeTextEntry1] : abnormal chest CT, acid reflux disease, allergic rhinitis, asthma, obstructive lung disease, CHILO and SOB

## 2021-07-15 NOTE — ADDENDUM
[FreeTextEntry1] : Documented by Srini Nazario acting as a scribe for Dr. Renan Delgado on (07/15/2021).\par \par All medical record entries made by the Scribe were at my, Dr. Renan Delgado's, direction and personally dictated by me on (07/15/2021). I have reviewed the chart and agree that the record accurately reflects my personal performance of the history, physical exam, assessment and plan. I have also personally directed, reviewed, and agree with the discharge instructions.\par

## 2021-07-15 NOTE — HISTORY OF PRESENT ILLNESS
[FreeTextEntry1] : Mr. Hays is 80 year old male with a history of abnormal chest CT, acid reflux disease, allergic rhinitis, asthma, obstructive lung disease, CHILO and SOB presenting to the office today for a follow up visit. His chief complaint is \par -he notes his health has been basically good\par -he notes diarrhea but this has been for a long time and due to medications he takes\par -he denies getting enough sleep\par -he notes getting 4-5 hours of sleep\par -he notes nocturia at least once\par -he notes the last time he drinks at night is 7:30\par -he notes his exercises is walking down the fisher\par -he denies doing any stretches\par -he notes being on AFib\par -he notes being off Warfarin\par -he notes being unable to stand to urinate anymore due to balance issues\par -he notes increased anxiety\par - S/p Covid 19 vaccine (Moderna) x2 \par -he denies any reaction to the vaccine\par -he notes still sleeping in a recliner\par \par -denies any headaches, nausea, vomiting, fever, chills, sweats, chest pain, chest pressure, constipation, dysphagia, sour taste in the mouth, dizziness, leg swelling, leg pain, myalgias, arthralgias, itchy eyes, itchy ears, heartburn, or reflux.\par \par

## 2021-07-20 ENCOUNTER — APPOINTMENT (OUTPATIENT)
Dept: INTERNAL MEDICINE | Facility: CLINIC | Age: 81
End: 2021-07-20
Payer: MEDICARE

## 2021-07-20 ENCOUNTER — MED ADMIN CHARGE (OUTPATIENT)
Age: 81
End: 2021-07-20

## 2021-07-20 PROCEDURE — 96372 THER/PROPH/DIAG INJ SC/IM: CPT

## 2021-07-20 RX ORDER — CYANOCOBALAMIN 1000 UG/ML
1000 INJECTION INTRAMUSCULAR; SUBCUTANEOUS
Qty: 0 | Refills: 0 | Status: COMPLETED | OUTPATIENT
Start: 2021-06-04

## 2021-09-01 ENCOUNTER — RX RENEWAL (OUTPATIENT)
Age: 81
End: 2021-09-01

## 2021-09-13 ENCOUNTER — APPOINTMENT (OUTPATIENT)
Dept: INTERNAL MEDICINE | Facility: CLINIC | Age: 81
End: 2021-09-13

## 2021-09-17 ENCOUNTER — RX RENEWAL (OUTPATIENT)
Age: 81
End: 2021-09-17

## 2021-09-22 ENCOUNTER — APPOINTMENT (OUTPATIENT)
Dept: INTERNAL MEDICINE | Facility: CLINIC | Age: 81
End: 2021-09-22
Payer: MEDICARE

## 2021-09-22 DIAGNOSIS — Z23 ENCOUNTER FOR IMMUNIZATION: ICD-10-CM

## 2021-09-22 PROCEDURE — G0008: CPT

## 2021-09-22 PROCEDURE — 90662 IIV NO PRSV INCREASED AG IM: CPT

## 2021-09-22 PROCEDURE — 96372 THER/PROPH/DIAG INJ SC/IM: CPT

## 2021-09-22 RX ORDER — CYANOCOBALAMIN 1000 UG/ML
1000 INJECTION INTRAMUSCULAR; SUBCUTANEOUS
Qty: 0 | Refills: 0 | Status: COMPLETED | OUTPATIENT
Start: 2021-09-22

## 2021-09-22 RX ADMIN — CYANOCOBALAMIN 0 MCG/ML: 1000 INJECTION INTRAMUSCULAR; SUBCUTANEOUS at 00:00

## 2022-01-01 ENCOUNTER — NON-APPOINTMENT (OUTPATIENT)
Age: 82
End: 2022-01-01

## 2022-01-01 ENCOUNTER — TRANSCRIPTION ENCOUNTER (OUTPATIENT)
Age: 82
End: 2022-01-01

## 2022-01-01 ENCOUNTER — RX RENEWAL (OUTPATIENT)
Age: 82
End: 2022-01-01

## 2022-01-01 ENCOUNTER — APPOINTMENT (OUTPATIENT)
Dept: INTERNAL MEDICINE | Facility: CLINIC | Age: 82
End: 2022-01-01

## 2022-01-01 ENCOUNTER — APPOINTMENT (OUTPATIENT)
Dept: WOUND CARE | Facility: CLINIC | Age: 82
End: 2022-01-01

## 2022-01-01 ENCOUNTER — APPOINTMENT (OUTPATIENT)
Dept: PULMONOLOGY | Facility: CLINIC | Age: 82
End: 2022-01-01
Payer: MEDICARE

## 2022-01-01 ENCOUNTER — OUTPATIENT (OUTPATIENT)
Dept: OUTPATIENT SERVICES | Facility: HOSPITAL | Age: 82
LOS: 1 days | Discharge: HOME | End: 2022-01-01

## 2022-01-01 ENCOUNTER — APPOINTMENT (OUTPATIENT)
Dept: PULMONOLOGY | Facility: CLINIC | Age: 82
End: 2022-01-01

## 2022-01-01 ENCOUNTER — APPOINTMENT (OUTPATIENT)
Dept: INTERNAL MEDICINE | Facility: CLINIC | Age: 82
End: 2022-01-01
Payer: MEDICARE

## 2022-01-01 ENCOUNTER — APPOINTMENT (OUTPATIENT)
Dept: HEMATOLOGY ONCOLOGY | Facility: CLINIC | Age: 82
End: 2022-01-01

## 2022-01-01 ENCOUNTER — INPATIENT (INPATIENT)
Facility: HOSPITAL | Age: 82
LOS: 8 days | Discharge: NURSING, REHAB OR LONG-TERM CARE | DRG: 871 | End: 2022-07-07
Attending: HOSPITALIST | Admitting: HOSPITALIST
Payer: MEDICARE

## 2022-01-01 VITALS
TEMPERATURE: 98 F | DIASTOLIC BLOOD PRESSURE: 59 MMHG | HEART RATE: 94 BPM | HEIGHT: 68 IN | SYSTOLIC BLOOD PRESSURE: 89 MMHG | OXYGEN SATURATION: 86 % | RESPIRATION RATE: 28 BRPM

## 2022-01-01 VITALS
TEMPERATURE: 97.1 F | DIASTOLIC BLOOD PRESSURE: 60 MMHG | HEIGHT: 69 IN | SYSTOLIC BLOOD PRESSURE: 110 MMHG | OXYGEN SATURATION: 97 % | WEIGHT: 183 LBS | BODY MASS INDEX: 27.11 KG/M2 | HEART RATE: 81 BPM

## 2022-01-01 VITALS — OXYGEN SATURATION: 93 % | RESPIRATION RATE: 18 BRPM | HEART RATE: 95 BPM

## 2022-01-01 VITALS
DIASTOLIC BLOOD PRESSURE: 60 MMHG | TEMPERATURE: 97.2 F | BODY MASS INDEX: 28.34 KG/M2 | HEIGHT: 68 IN | HEART RATE: 90 BPM | WEIGHT: 187 LBS | RESPIRATION RATE: 16 BRPM | OXYGEN SATURATION: 95 % | SYSTOLIC BLOOD PRESSURE: 98 MMHG

## 2022-01-01 DIAGNOSIS — J18.9 PNEUMONIA, UNSPECIFIED ORGANISM: ICD-10-CM

## 2022-01-01 DIAGNOSIS — J96.01 ACUTE RESPIRATORY FAILURE WITH HYPOXIA: ICD-10-CM

## 2022-01-01 DIAGNOSIS — J44.9 CHRONIC OBSTRUCTIVE PULMONARY DISEASE, UNSPECIFIED: ICD-10-CM

## 2022-01-01 DIAGNOSIS — Z29.9 ENCOUNTER FOR PROPHYLACTIC MEASURES, UNSPECIFIED: ICD-10-CM

## 2022-01-01 DIAGNOSIS — I10 ESSENTIAL (PRIMARY) HYPERTENSION: ICD-10-CM

## 2022-01-01 DIAGNOSIS — F41.9 ANXIETY DISORDER, UNSPECIFIED: ICD-10-CM

## 2022-01-01 DIAGNOSIS — J45.909 UNSPECIFIED ASTHMA, UNCOMPLICATED: ICD-10-CM

## 2022-01-01 DIAGNOSIS — D64.9 ANEMIA, UNSPECIFIED: ICD-10-CM

## 2022-01-01 DIAGNOSIS — J96.11 CHRONIC RESPIRATORY FAILURE WITH HYPOXIA: ICD-10-CM

## 2022-01-01 DIAGNOSIS — Z00.00 ENCOUNTER FOR GENERAL ADULT MEDICAL EXAMINATION W/OUT ABNORMAL FINDINGS: ICD-10-CM

## 2022-01-01 DIAGNOSIS — R06.02 SHORTNESS OF BREATH: ICD-10-CM

## 2022-01-01 DIAGNOSIS — I25.10 ATHEROSCLEROTIC HEART DISEASE OF NATIVE CORONARY ARTERY WITHOUT ANGINA PECTORIS: ICD-10-CM

## 2022-01-01 DIAGNOSIS — D50.9 IRON DEFICIENCY ANEMIA, UNSPECIFIED: ICD-10-CM

## 2022-01-01 DIAGNOSIS — R93.89 ABNORMAL FINDINGS ON DIAGNOSTIC IMAGING OF OTHER SPECIFIED BODY STRUCTURES: ICD-10-CM

## 2022-01-01 DIAGNOSIS — K21.9 GASTRO-ESOPHAGEAL REFLUX DISEASE W/OUT ESOPHAGITIS: ICD-10-CM

## 2022-01-01 DIAGNOSIS — G62.9 POLYNEUROPATHY, UNSPECIFIED: ICD-10-CM

## 2022-01-01 DIAGNOSIS — E78.00 PURE HYPERCHOLESTEROLEMIA, UNSPECIFIED: ICD-10-CM

## 2022-01-01 DIAGNOSIS — E11.9 TYPE 2 DIABETES MELLITUS W/OUT COMPLICATIONS: ICD-10-CM

## 2022-01-01 DIAGNOSIS — N40.0 BENIGN PROSTATIC HYPERPLASIA WITHOUT LOWER URINARY TRACT SYMPMS: ICD-10-CM

## 2022-01-01 DIAGNOSIS — M54.50 LOW BACK PAIN, UNSPECIFIED: ICD-10-CM

## 2022-01-01 DIAGNOSIS — E55.9 VITAMIN D DEFICIENCY, UNSPECIFIED: ICD-10-CM

## 2022-01-01 DIAGNOSIS — E11.9 TYPE 2 DIABETES MELLITUS WITHOUT COMPLICATIONS: ICD-10-CM

## 2022-01-01 DIAGNOSIS — E61.1 IRON DEFICIENCY: ICD-10-CM

## 2022-01-01 DIAGNOSIS — I48.0 PAROXYSMAL ATRIAL FIBRILLATION: ICD-10-CM

## 2022-01-01 DIAGNOSIS — L89.90 PRESSURE ULCER OF UNSPECIFIED SITE, UNSPECIFIED STAGE: ICD-10-CM

## 2022-01-01 DIAGNOSIS — J44.1 CHRONIC OBSTRUCTIVE PULMONARY DISEASE WITH (ACUTE) EXACERBATION: ICD-10-CM

## 2022-01-01 DIAGNOSIS — Z01.812 ENCOUNTER FOR PREPROCEDURAL LABORATORY EXAMINATION: ICD-10-CM

## 2022-01-01 DIAGNOSIS — R74.8 ABNORMAL LEVELS OF OTHER SERUM ENZYMES: ICD-10-CM

## 2022-01-01 DIAGNOSIS — J30.9 ALLERGIC RHINITIS, UNSPECIFIED: ICD-10-CM

## 2022-01-01 DIAGNOSIS — R33.9 RETENTION OF URINE, UNSPECIFIED: ICD-10-CM

## 2022-01-01 LAB
25(OH)D3 SERPL-MCNC: 50 NG/ML
A1C WITH ESTIMATED AVERAGE GLUCOSE RESULT: 5.9 % — HIGH (ref 4–5.6)
ALBUMIN SERPL ELPH-MCNC: 3.1 G/DL — LOW (ref 3.3–5)
ALBUMIN SERPL ELPH-MCNC: 3.2 G/DL — LOW (ref 3.3–5)
ALBUMIN SERPL ELPH-MCNC: 3.9 G/DL
ALP BLD-CCNC: 124 U/L
ALP SERPL-CCNC: 114 U/L — SIGNIFICANT CHANGE UP (ref 40–120)
ALP SERPL-CCNC: 124 U/L — HIGH (ref 40–120)
ALT FLD-CCNC: 17 U/L — SIGNIFICANT CHANGE UP (ref 10–45)
ALT FLD-CCNC: 19 U/L — SIGNIFICANT CHANGE UP (ref 10–45)
ALT SERPL-CCNC: 11 U/L
ANION GAP SERPL CALC-SCNC: 10 MMOL/L — SIGNIFICANT CHANGE UP (ref 5–17)
ANION GAP SERPL CALC-SCNC: 10 MMOL/L — SIGNIFICANT CHANGE UP (ref 5–17)
ANION GAP SERPL CALC-SCNC: 12 MMOL/L — SIGNIFICANT CHANGE UP (ref 5–17)
ANION GAP SERPL CALC-SCNC: 12 MMOL/L — SIGNIFICANT CHANGE UP (ref 5–17)
ANION GAP SERPL CALC-SCNC: 16 MMOL/L
ANION GAP SERPL CALC-SCNC: 7 MMOL/L — SIGNIFICANT CHANGE UP (ref 5–17)
ANISOCYTOSIS BLD QL: SLIGHT — SIGNIFICANT CHANGE UP
APPEARANCE UR: CLEAR — SIGNIFICANT CHANGE UP
APPEARANCE UR: CLEAR — SIGNIFICANT CHANGE UP
APTT BLD: 101.8 SEC — HIGH (ref 27.5–35.5)
APTT BLD: 31.7 SEC — SIGNIFICANT CHANGE UP (ref 27.5–35.5)
APTT BLD: 35.1 SEC — SIGNIFICANT CHANGE UP (ref 27.5–35.5)
APTT BLD: 39.2 SEC — HIGH (ref 27.5–35.5)
APTT BLD: 52.4 SEC — HIGH (ref 27.5–35.5)
APTT BLD: 55.9 SEC — HIGH (ref 27.5–35.5)
APTT BLD: 56.7 SEC — HIGH (ref 27.5–35.5)
APTT BLD: 62.2 SEC — HIGH (ref 27.5–35.5)
APTT BLD: 67.7 SEC — HIGH (ref 27.5–35.5)
APTT BLD: 69.8 SEC — HIGH (ref 27.5–35.5)
AST SERPL-CCNC: 13 U/L — SIGNIFICANT CHANGE UP (ref 10–40)
AST SERPL-CCNC: 15 U/L — SIGNIFICANT CHANGE UP (ref 10–40)
AST SERPL-CCNC: 19 U/L
BACTERIA # UR AUTO: NEGATIVE — SIGNIFICANT CHANGE UP
BASE EXCESS BLDV CALC-SCNC: -1.4 MMOL/L — SIGNIFICANT CHANGE UP (ref -2–2)
BASE EXCESS BLDV CALC-SCNC: -2.1 MMOL/L — LOW (ref -2–2)
BASE EXCESS BLDV CALC-SCNC: 1.2 MMOL/L — SIGNIFICANT CHANGE UP (ref -2–2)
BASOPHILS # BLD AUTO: 0 K/UL — SIGNIFICANT CHANGE UP (ref 0–0.2)
BASOPHILS # BLD AUTO: 0.01 K/UL — SIGNIFICANT CHANGE UP (ref 0–0.2)
BASOPHILS # BLD AUTO: 0.01 K/UL — SIGNIFICANT CHANGE UP (ref 0–0.2)
BASOPHILS # BLD AUTO: 0.07 K/UL
BASOPHILS NFR BLD AUTO: 0 % — SIGNIFICANT CHANGE UP (ref 0–2)
BASOPHILS NFR BLD AUTO: 0.1 % — SIGNIFICANT CHANGE UP (ref 0–2)
BASOPHILS NFR BLD AUTO: 0.1 % — SIGNIFICANT CHANGE UP (ref 0–2)
BASOPHILS NFR BLD AUTO: 1.1 %
BILIRUB SERPL-MCNC: 0.3 MG/DL
BILIRUB SERPL-MCNC: 0.4 MG/DL — SIGNIFICANT CHANGE UP (ref 0.2–1.2)
BILIRUB SERPL-MCNC: 0.4 MG/DL — SIGNIFICANT CHANGE UP (ref 0.2–1.2)
BILIRUB UR-MCNC: NEGATIVE — SIGNIFICANT CHANGE UP
BILIRUB UR-MCNC: NEGATIVE — SIGNIFICANT CHANGE UP
BLD GP AB SCN SERPL QL: NEGATIVE — SIGNIFICANT CHANGE UP
BLOOD GAS VENOUS - CREATININE: SIGNIFICANT CHANGE UP MG/DL (ref 0.5–1.3)
BUN SERPL-MCNC: 10 MG/DL
BUN SERPL-MCNC: 14 MG/DL — SIGNIFICANT CHANGE UP (ref 7–23)
BUN SERPL-MCNC: 18 MG/DL — SIGNIFICANT CHANGE UP (ref 7–23)
BUN SERPL-MCNC: 8 MG/DL — SIGNIFICANT CHANGE UP (ref 7–23)
BUN SERPL-MCNC: 9 MG/DL — SIGNIFICANT CHANGE UP (ref 7–23)
BUN SERPL-MCNC: 9 MG/DL — SIGNIFICANT CHANGE UP (ref 7–23)
CA-I SERPL-SCNC: 1.27 MMOL/L — SIGNIFICANT CHANGE UP (ref 1.15–1.33)
CA-I SERPL-SCNC: 1.32 MMOL/L — SIGNIFICANT CHANGE UP (ref 1.15–1.33)
CA-I SERPL-SCNC: 1.33 MMOL/L — SIGNIFICANT CHANGE UP (ref 1.15–1.33)
CALCIUM SERPL-MCNC: 10 MG/DL — SIGNIFICANT CHANGE UP (ref 8.4–10.5)
CALCIUM SERPL-MCNC: 9.3 MG/DL — SIGNIFICANT CHANGE UP (ref 8.4–10.5)
CALCIUM SERPL-MCNC: 9.4 MG/DL — SIGNIFICANT CHANGE UP (ref 8.4–10.5)
CALCIUM SERPL-MCNC: 9.6 MG/DL — SIGNIFICANT CHANGE UP (ref 8.4–10.5)
CALCIUM SERPL-MCNC: 9.8 MG/DL
CALCIUM SERPL-MCNC: 9.9 MG/DL — SIGNIFICANT CHANGE UP (ref 8.4–10.5)
CHLORIDE BLDV-SCNC: 100 MMOL/L — SIGNIFICANT CHANGE UP (ref 96–108)
CHLORIDE BLDV-SCNC: 100 MMOL/L — SIGNIFICANT CHANGE UP (ref 96–108)
CHLORIDE BLDV-SCNC: 96 MMOL/L — SIGNIFICANT CHANGE UP (ref 96–108)
CHLORIDE SERPL-SCNC: 100 MMOL/L — SIGNIFICANT CHANGE UP (ref 96–108)
CHLORIDE SERPL-SCNC: 100 MMOL/L — SIGNIFICANT CHANGE UP (ref 96–108)
CHLORIDE SERPL-SCNC: 94 MMOL/L — LOW (ref 96–108)
CHLORIDE SERPL-SCNC: 95 MMOL/L — LOW (ref 96–108)
CHLORIDE SERPL-SCNC: 98 MMOL/L
CHLORIDE SERPL-SCNC: 99 MMOL/L — SIGNIFICANT CHANGE UP (ref 96–108)
CHOLEST SERPL-MCNC: 161 MG/DL
CO2 BLDV-SCNC: 26 MMOL/L — SIGNIFICANT CHANGE UP (ref 22–26)
CO2 BLDV-SCNC: 27 MMOL/L — HIGH (ref 22–26)
CO2 BLDV-SCNC: 30 MMOL/L — HIGH (ref 22–26)
CO2 SERPL-SCNC: 21 MMOL/L — LOW (ref 22–31)
CO2 SERPL-SCNC: 22 MMOL/L
CO2 SERPL-SCNC: 22 MMOL/L — SIGNIFICANT CHANGE UP (ref 22–31)
CO2 SERPL-SCNC: 27 MMOL/L — SIGNIFICANT CHANGE UP (ref 22–31)
CO2 SERPL-SCNC: 28 MMOL/L — SIGNIFICANT CHANGE UP (ref 22–31)
CO2 SERPL-SCNC: 31 MMOL/L — SIGNIFICANT CHANGE UP (ref 22–31)
COLOR SPEC: SIGNIFICANT CHANGE UP
COLOR SPEC: YELLOW — SIGNIFICANT CHANGE UP
COVID-19 NUCLEOCAPSID  GAM ANTIBODY INTERPRETATION: NEGATIVE
COVID-19 SPIKE DOMAIN ANTIBODY INTERPRETATION: POSITIVE
CREAT SERPL-MCNC: 0.39 MG/DL — LOW (ref 0.5–1.3)
CREAT SERPL-MCNC: 0.4 MG/DL — LOW (ref 0.5–1.3)
CREAT SERPL-MCNC: 0.43 MG/DL — LOW (ref 0.5–1.3)
CREAT SERPL-MCNC: 0.48 MG/DL
CREAT SERPL-MCNC: 0.49 MG/DL — LOW (ref 0.5–1.3)
CREAT SERPL-MCNC: 0.58 MG/DL — SIGNIFICANT CHANGE UP (ref 0.5–1.3)
CULTURE RESULTS: SIGNIFICANT CHANGE UP
CULTURE RESULTS: SIGNIFICANT CHANGE UP
DACRYOCYTES BLD QL SMEAR: SLIGHT — SIGNIFICANT CHANGE UP
DIFF PNL FLD: ABNORMAL
DIFF PNL FLD: NEGATIVE — SIGNIFICANT CHANGE UP
EGFR: 103 ML/MIN/1.73M2 — SIGNIFICANT CHANGE UP
EGFR: 104 ML/MIN/1.73M2
EGFR: 107 ML/MIN/1.73M2 — SIGNIFICANT CHANGE UP
EGFR: 110 ML/MIN/1.73M2 — SIGNIFICANT CHANGE UP
EGFR: 110 ML/MIN/1.73M2 — SIGNIFICANT CHANGE UP
EGFR: 98 ML/MIN/1.73M2 — SIGNIFICANT CHANGE UP
ELLIPTOCYTES BLD QL SMEAR: SLIGHT — SIGNIFICANT CHANGE UP
EOSINOPHIL # BLD AUTO: 0 K/UL — SIGNIFICANT CHANGE UP (ref 0–0.5)
EOSINOPHIL # BLD AUTO: 0.06 K/UL
EOSINOPHIL NFR BLD AUTO: 0 % — SIGNIFICANT CHANGE UP (ref 0–6)
EOSINOPHIL NFR BLD AUTO: 0.9 %
EPI CELLS # UR: 1 /HPF — SIGNIFICANT CHANGE UP
ESTIMATED AVERAGE GLUCOSE: 117 MG/DL
ESTIMATED AVERAGE GLUCOSE: 123 MG/DL — HIGH (ref 68–114)
FERRITIN SERPL-MCNC: 70 NG/ML — SIGNIFICANT CHANGE UP (ref 30–400)
FERRITIN SERPL-MCNC: 84 NG/ML — SIGNIFICANT CHANGE UP (ref 30–400)
FOLATE SERPL-MCNC: 11.9 NG/ML — SIGNIFICANT CHANGE UP
FOLATE SERPL-MCNC: 4.2 NG/ML
GAS PNL BLDV: 131 MMOL/L — LOW (ref 136–145)
GAS PNL BLDV: 131 MMOL/L — LOW (ref 136–145)
GAS PNL BLDV: 132 MMOL/L — LOW (ref 136–145)
GAS PNL BLDV: SIGNIFICANT CHANGE UP
GLUCOSE BLDC GLUCOMTR-MCNC: 123 MG/DL — HIGH (ref 70–99)
GLUCOSE BLDC GLUCOMTR-MCNC: 136 MG/DL — HIGH (ref 70–99)
GLUCOSE BLDC GLUCOMTR-MCNC: 140 MG/DL — HIGH (ref 70–99)
GLUCOSE BLDC GLUCOMTR-MCNC: 149 MG/DL — HIGH (ref 70–99)
GLUCOSE BLDC GLUCOMTR-MCNC: 154 MG/DL — HIGH (ref 70–99)
GLUCOSE BLDC GLUCOMTR-MCNC: 169 MG/DL — HIGH (ref 70–99)
GLUCOSE BLDC GLUCOMTR-MCNC: 178 MG/DL — HIGH (ref 70–99)
GLUCOSE BLDC GLUCOMTR-MCNC: 182 MG/DL — HIGH (ref 70–99)
GLUCOSE BLDC GLUCOMTR-MCNC: 189 MG/DL — HIGH (ref 70–99)
GLUCOSE BLDC GLUCOMTR-MCNC: 193 MG/DL — HIGH (ref 70–99)
GLUCOSE BLDC GLUCOMTR-MCNC: 200 MG/DL — HIGH (ref 70–99)
GLUCOSE BLDC GLUCOMTR-MCNC: 200 MG/DL — HIGH (ref 70–99)
GLUCOSE BLDC GLUCOMTR-MCNC: 201 MG/DL — HIGH (ref 70–99)
GLUCOSE BLDC GLUCOMTR-MCNC: 203 MG/DL — HIGH (ref 70–99)
GLUCOSE BLDC GLUCOMTR-MCNC: 206 MG/DL — HIGH (ref 70–99)
GLUCOSE BLDC GLUCOMTR-MCNC: 210 MG/DL — HIGH (ref 70–99)
GLUCOSE BLDC GLUCOMTR-MCNC: 211 MG/DL — HIGH (ref 70–99)
GLUCOSE BLDC GLUCOMTR-MCNC: 216 MG/DL — HIGH (ref 70–99)
GLUCOSE BLDC GLUCOMTR-MCNC: 221 MG/DL — HIGH (ref 70–99)
GLUCOSE BLDC GLUCOMTR-MCNC: 228 MG/DL — HIGH (ref 70–99)
GLUCOSE BLDC GLUCOMTR-MCNC: 235 MG/DL — HIGH (ref 70–99)
GLUCOSE BLDC GLUCOMTR-MCNC: 237 MG/DL — HIGH (ref 70–99)
GLUCOSE BLDC GLUCOMTR-MCNC: 237 MG/DL — HIGH (ref 70–99)
GLUCOSE BLDC GLUCOMTR-MCNC: 240 MG/DL — HIGH (ref 70–99)
GLUCOSE BLDC GLUCOMTR-MCNC: 244 MG/DL — HIGH (ref 70–99)
GLUCOSE BLDC GLUCOMTR-MCNC: 247 MG/DL — HIGH (ref 70–99)
GLUCOSE BLDC GLUCOMTR-MCNC: 249 MG/DL — HIGH (ref 70–99)
GLUCOSE BLDC GLUCOMTR-MCNC: 250 MG/DL — HIGH (ref 70–99)
GLUCOSE BLDC GLUCOMTR-MCNC: 254 MG/DL — HIGH (ref 70–99)
GLUCOSE BLDC GLUCOMTR-MCNC: 254 MG/DL — HIGH (ref 70–99)
GLUCOSE BLDC GLUCOMTR-MCNC: 261 MG/DL — HIGH (ref 70–99)
GLUCOSE BLDC GLUCOMTR-MCNC: 272 MG/DL — HIGH (ref 70–99)
GLUCOSE BLDC GLUCOMTR-MCNC: 275 MG/DL — HIGH (ref 70–99)
GLUCOSE BLDC GLUCOMTR-MCNC: 280 MG/DL — HIGH (ref 70–99)
GLUCOSE BLDC GLUCOMTR-MCNC: 282 MG/DL — HIGH (ref 70–99)
GLUCOSE BLDC GLUCOMTR-MCNC: 296 MG/DL — HIGH (ref 70–99)
GLUCOSE BLDV-MCNC: 224 MG/DL — HIGH (ref 70–99)
GLUCOSE BLDV-MCNC: 237 MG/DL — HIGH (ref 70–99)
GLUCOSE BLDV-MCNC: 260 MG/DL — HIGH (ref 70–99)
GLUCOSE SERPL-MCNC: 138 MG/DL — HIGH (ref 70–99)
GLUCOSE SERPL-MCNC: 142 MG/DL — HIGH (ref 70–99)
GLUCOSE SERPL-MCNC: 161 MG/DL
GLUCOSE SERPL-MCNC: 186 MG/DL — HIGH (ref 70–99)
GLUCOSE SERPL-MCNC: 220 MG/DL — HIGH (ref 70–99)
GLUCOSE SERPL-MCNC: 238 MG/DL — HIGH (ref 70–99)
GLUCOSE UR QL: ABNORMAL
GLUCOSE UR QL: ABNORMAL
HBA1C MFR BLD HPLC: 5.7 %
HCO3 BLDV-SCNC: 25 MMOL/L — SIGNIFICANT CHANGE UP (ref 22–29)
HCO3 BLDV-SCNC: 26 MMOL/L — SIGNIFICANT CHANGE UP (ref 22–29)
HCO3 BLDV-SCNC: 28 MMOL/L — SIGNIFICANT CHANGE UP (ref 22–29)
HCT VFR BLD CALC: 28 % — LOW (ref 39–50)
HCT VFR BLD CALC: 29.1 % — LOW (ref 39–50)
HCT VFR BLD CALC: 29.2 % — LOW (ref 39–50)
HCT VFR BLD CALC: 30.7 % — LOW (ref 39–50)
HCT VFR BLD CALC: 31.1 % — LOW (ref 39–50)
HCT VFR BLD CALC: 31.5 % — LOW (ref 39–50)
HCT VFR BLD CALC: 32.1 % — LOW (ref 39–50)
HCT VFR BLD CALC: 32.4 % — LOW (ref 39–50)
HCT VFR BLD CALC: 32.5 % — LOW (ref 39–50)
HCT VFR BLD CALC: 32.6 % — LOW (ref 39–50)
HCT VFR BLD CALC: 33 % — LOW (ref 39–50)
HCT VFR BLD CALC: 33 % — LOW (ref 39–50)
HCT VFR BLD CALC: 35.9 %
HCT VFR BLD CALC: 37 % — LOW (ref 39–50)
HCT VFR BLDA CALC: 28 % — LOW (ref 39–51)
HCT VFR BLDA CALC: 29 % — LOW (ref 39–51)
HCT VFR BLDA CALC: 34 % — LOW (ref 39–51)
HCV AB SER QL: NONREACTIVE
HCV S/CO RATIO: 0.09 S/CO
HDLC SERPL-MCNC: 78 MG/DL
HGB BLD CALC-MCNC: 11.4 G/DL — LOW (ref 12.6–17.4)
HGB BLD CALC-MCNC: 9.3 G/DL — LOW (ref 12.6–17.4)
HGB BLD CALC-MCNC: 9.7 G/DL — LOW (ref 12.6–17.4)
HGB BLD-MCNC: 10 G/DL — LOW (ref 13–17)
HGB BLD-MCNC: 10 G/DL — LOW (ref 13–17)
HGB BLD-MCNC: 11 G/DL
HGB BLD-MCNC: 11 G/DL — LOW (ref 13–17)
HGB BLD-MCNC: 8.6 G/DL — LOW (ref 13–17)
HGB BLD-MCNC: 9.1 G/DL — LOW (ref 13–17)
HGB BLD-MCNC: 9.1 G/DL — LOW (ref 13–17)
HGB BLD-MCNC: 9.4 G/DL — LOW (ref 13–17)
HGB BLD-MCNC: 9.8 G/DL — LOW (ref 13–17)
HGB BLD-MCNC: 9.9 G/DL — LOW (ref 13–17)
HYALINE CASTS # UR AUTO: 6 /LPF — HIGH (ref 0–2)
HYPOCHROMIA BLD QL: SLIGHT — SIGNIFICANT CHANGE UP
IMM GRANULOCYTES NFR BLD AUTO: 0.5 %
IMM GRANULOCYTES NFR BLD AUTO: 0.7 % — SIGNIFICANT CHANGE UP (ref 0–1.5)
IMM GRANULOCYTES NFR BLD AUTO: 0.8 % — SIGNIFICANT CHANGE UP (ref 0–1.5)
INR BLD: 1.72 RATIO — HIGH (ref 0.88–1.16)
IRON SATN MFR SERPL: 15 UG/DL — LOW (ref 45–165)
IRON SATN MFR SERPL: 18 UG/DL — LOW (ref 45–165)
IRON SATN MFR SERPL: 6 % — LOW (ref 16–55)
IRON SATN MFR SERPL: 6 % — LOW (ref 16–55)
IRON SERPL-MCNC: 25 UG/DL
KETONES UR-MCNC: ABNORMAL
KETONES UR-MCNC: ABNORMAL
LACTATE BLDV-MCNC: 2.5 MMOL/L — HIGH (ref 0.7–2)
LACTATE BLDV-MCNC: 3.9 MMOL/L — HIGH (ref 0.7–2)
LACTATE BLDV-MCNC: 5.1 MMOL/L — CRITICAL HIGH (ref 0.7–2)
LACTATE SERPL-SCNC: 1.9 MMOL/L — SIGNIFICANT CHANGE UP (ref 0.7–2)
LACTATE SERPL-SCNC: 2.2 MMOL/L — HIGH (ref 0.7–2)
LDLC SERPL CALC-MCNC: 63 MG/DL
LEGIONELLA AG UR QL: NEGATIVE — SIGNIFICANT CHANGE UP
LEGIONELLA AG UR QL: NEGATIVE — SIGNIFICANT CHANGE UP
LEUKOCYTE ESTERASE UR-ACNC: NEGATIVE — SIGNIFICANT CHANGE UP
LEUKOCYTE ESTERASE UR-ACNC: NEGATIVE — SIGNIFICANT CHANGE UP
LYMPHOCYTES # BLD AUTO: 0.27 K/UL — LOW (ref 1–3.3)
LYMPHOCYTES # BLD AUTO: 0.38 K/UL — LOW (ref 1–3.3)
LYMPHOCYTES # BLD AUTO: 0.39 K/UL — LOW (ref 1–3.3)
LYMPHOCYTES # BLD AUTO: 1 K/UL
LYMPHOCYTES # BLD AUTO: 2.7 % — LOW (ref 13–44)
LYMPHOCYTES # BLD AUTO: 3.5 % — LOW (ref 13–44)
LYMPHOCYTES # BLD AUTO: 4 % — LOW (ref 13–44)
LYMPHOCYTES NFR BLD AUTO: 15.4 %
M PNEUMO IGM SER-ACNC: 0.08 INDEX — SIGNIFICANT CHANGE UP (ref 0–0.9)
MACROCYTES BLD QL: SLIGHT — SIGNIFICANT CHANGE UP
MAGNESIUM SERPL-MCNC: 1.4 MG/DL — LOW (ref 1.6–2.6)
MAGNESIUM SERPL-MCNC: 1.5 MG/DL — LOW (ref 1.6–2.6)
MAN DIFF?: NORMAL
MANUAL SMEAR VERIFICATION: SIGNIFICANT CHANGE UP
MCHC RBC-ENTMCNC: 23.6 PG — LOW (ref 27–34)
MCHC RBC-ENTMCNC: 23.7 PG — LOW (ref 27–34)
MCHC RBC-ENTMCNC: 23.7 PG — LOW (ref 27–34)
MCHC RBC-ENTMCNC: 23.8 PG — LOW (ref 27–34)
MCHC RBC-ENTMCNC: 23.9 PG — LOW (ref 27–34)
MCHC RBC-ENTMCNC: 23.9 PG — LOW (ref 27–34)
MCHC RBC-ENTMCNC: 24 PG — LOW (ref 27–34)
MCHC RBC-ENTMCNC: 24 PG — LOW (ref 27–34)
MCHC RBC-ENTMCNC: 24.1 PG
MCHC RBC-ENTMCNC: 24.1 PG — LOW (ref 27–34)
MCHC RBC-ENTMCNC: 29.7 GM/DL — LOW (ref 32–36)
MCHC RBC-ENTMCNC: 29.8 GM/DL — LOW (ref 32–36)
MCHC RBC-ENTMCNC: 30 GM/DL — LOW (ref 32–36)
MCHC RBC-ENTMCNC: 30.2 GM/DL — LOW (ref 32–36)
MCHC RBC-ENTMCNC: 30.3 GM/DL — LOW (ref 32–36)
MCHC RBC-ENTMCNC: 30.4 GM/DL — LOW (ref 32–36)
MCHC RBC-ENTMCNC: 30.5 GM/DL — LOW (ref 32–36)
MCHC RBC-ENTMCNC: 30.5 GM/DL — LOW (ref 32–36)
MCHC RBC-ENTMCNC: 30.6 GM/DL
MCHC RBC-ENTMCNC: 30.6 GM/DL — LOW (ref 32–36)
MCHC RBC-ENTMCNC: 30.7 GM/DL — LOW (ref 32–36)
MCHC RBC-ENTMCNC: 30.9 GM/DL — LOW (ref 32–36)
MCHC RBC-ENTMCNC: 31.2 GM/DL — LOW (ref 32–36)
MCHC RBC-ENTMCNC: 31.3 GM/DL — LOW (ref 32–36)
MCV RBC AUTO: 76.4 FL — LOW (ref 80–100)
MCV RBC AUTO: 76.7 FL — LOW (ref 80–100)
MCV RBC AUTO: 76.8 FL — LOW (ref 80–100)
MCV RBC AUTO: 77.5 FL — LOW (ref 80–100)
MCV RBC AUTO: 77.9 FL — LOW (ref 80–100)
MCV RBC AUTO: 77.9 FL — LOW (ref 80–100)
MCV RBC AUTO: 78.3 FL — LOW (ref 80–100)
MCV RBC AUTO: 78.4 FL — LOW (ref 80–100)
MCV RBC AUTO: 78.7 FL
MCV RBC AUTO: 78.8 FL — LOW (ref 80–100)
MCV RBC AUTO: 79.1 FL — LOW (ref 80–100)
MCV RBC AUTO: 79.1 FL — LOW (ref 80–100)
MCV RBC AUTO: 79.2 FL — LOW (ref 80–100)
MCV RBC AUTO: 79.3 FL — LOW (ref 80–100)
MICROCYTES BLD QL: SLIGHT — SIGNIFICANT CHANGE UP
MONOCYTES # BLD AUTO: 0.29 K/UL — SIGNIFICANT CHANGE UP (ref 0–0.9)
MONOCYTES # BLD AUTO: 0.44 K/UL — SIGNIFICANT CHANGE UP (ref 0–0.9)
MONOCYTES # BLD AUTO: 0.57 K/UL
MONOCYTES # BLD AUTO: 0.65 K/UL — SIGNIFICANT CHANGE UP (ref 0–0.9)
MONOCYTES NFR BLD AUTO: 2.6 % — SIGNIFICANT CHANGE UP (ref 2–14)
MONOCYTES NFR BLD AUTO: 4.5 % — SIGNIFICANT CHANGE UP (ref 2–14)
MONOCYTES NFR BLD AUTO: 6.9 % — SIGNIFICANT CHANGE UP (ref 2–14)
MONOCYTES NFR BLD AUTO: 8.8 %
MYCOPLASMA AG SPEC QL: NEGATIVE — SIGNIFICANT CHANGE UP
NEUTROPHILS # BLD AUTO: 10.35 K/UL — HIGH (ref 1.8–7.4)
NEUTROPHILS # BLD AUTO: 4.78 K/UL
NEUTROPHILS # BLD AUTO: 8.37 K/UL — HIGH (ref 1.8–7.4)
NEUTROPHILS # BLD AUTO: 9.05 K/UL — HIGH (ref 1.8–7.4)
NEUTROPHILS NFR BLD AUTO: 73.3 %
NEUTROPHILS NFR BLD AUTO: 82.6 % — HIGH (ref 43–77)
NEUTROPHILS NFR BLD AUTO: 88.3 % — HIGH (ref 43–77)
NEUTROPHILS NFR BLD AUTO: 91.9 % — HIGH (ref 43–77)
NEUTS BAND # BLD: 11.3 % — HIGH (ref 0–8)
NITRITE UR-MCNC: NEGATIVE — SIGNIFICANT CHANGE UP
NITRITE UR-MCNC: NEGATIVE — SIGNIFICANT CHANGE UP
NONHDLC SERPL-MCNC: 83 MG/DL
NRBC # BLD: 0 /100 WBCS — SIGNIFICANT CHANGE UP (ref 0–0)
OB PNL STL: POSITIVE
PCO2 BLDV: 53 MMHG — SIGNIFICANT CHANGE UP (ref 42–55)
PH BLDV: 7.28 — LOW (ref 7.32–7.43)
PH BLDV: 7.29 — LOW (ref 7.32–7.43)
PH BLDV: 7.33 — SIGNIFICANT CHANGE UP (ref 7.32–7.43)
PH UR: 5 — SIGNIFICANT CHANGE UP (ref 5–8)
PH UR: 6.5 — SIGNIFICANT CHANGE UP (ref 5–8)
PHOSPHATE SERPL-MCNC: 2.8 MG/DL — SIGNIFICANT CHANGE UP (ref 2.5–4.5)
PHOSPHATE SERPL-MCNC: 3.6 MG/DL — SIGNIFICANT CHANGE UP (ref 2.5–4.5)
PLAT MORPH BLD: NORMAL — SIGNIFICANT CHANGE UP
PLATELET # BLD AUTO: 232 K/UL — SIGNIFICANT CHANGE UP (ref 150–400)
PLATELET # BLD AUTO: 232 K/UL — SIGNIFICANT CHANGE UP (ref 150–400)
PLATELET # BLD AUTO: 240 K/UL — SIGNIFICANT CHANGE UP (ref 150–400)
PLATELET # BLD AUTO: 242 K/UL — SIGNIFICANT CHANGE UP (ref 150–400)
PLATELET # BLD AUTO: 244 K/UL — SIGNIFICANT CHANGE UP (ref 150–400)
PLATELET # BLD AUTO: 245 K/UL — SIGNIFICANT CHANGE UP (ref 150–400)
PLATELET # BLD AUTO: 251 K/UL — SIGNIFICANT CHANGE UP (ref 150–400)
PLATELET # BLD AUTO: 255 K/UL — SIGNIFICANT CHANGE UP (ref 150–400)
PLATELET # BLD AUTO: 295 K/UL — SIGNIFICANT CHANGE UP (ref 150–400)
PLATELET # BLD AUTO: 301 K/UL — SIGNIFICANT CHANGE UP (ref 150–400)
PLATELET # BLD AUTO: 305 K/UL — SIGNIFICANT CHANGE UP (ref 150–400)
PLATELET # BLD AUTO: 305 K/UL — SIGNIFICANT CHANGE UP (ref 150–400)
PLATELET # BLD AUTO: 329 K/UL — SIGNIFICANT CHANGE UP (ref 150–400)
PLATELET # BLD AUTO: 333 K/UL
PO2 BLDV: 18 MMHG — LOW (ref 25–45)
PO2 BLDV: 25 MMHG — SIGNIFICANT CHANGE UP (ref 25–45)
PO2 BLDV: 37 MMHG — SIGNIFICANT CHANGE UP (ref 25–45)
POIKILOCYTOSIS BLD QL AUTO: SLIGHT — SIGNIFICANT CHANGE UP
POLYCHROMASIA BLD QL SMEAR: SLIGHT — SIGNIFICANT CHANGE UP
POTASSIUM BLDV-SCNC: 4.2 MMOL/L — SIGNIFICANT CHANGE UP (ref 3.5–5.1)
POTASSIUM BLDV-SCNC: 4.6 MMOL/L — SIGNIFICANT CHANGE UP (ref 3.5–5.1)
POTASSIUM BLDV-SCNC: 5.3 MMOL/L — HIGH (ref 3.5–5.1)
POTASSIUM SERPL-MCNC: 3.4 MMOL/L — LOW (ref 3.5–5.3)
POTASSIUM SERPL-MCNC: 3.9 MMOL/L — SIGNIFICANT CHANGE UP (ref 3.5–5.3)
POTASSIUM SERPL-MCNC: 4.1 MMOL/L — SIGNIFICANT CHANGE UP (ref 3.5–5.3)
POTASSIUM SERPL-MCNC: 4.3 MMOL/L — SIGNIFICANT CHANGE UP (ref 3.5–5.3)
POTASSIUM SERPL-MCNC: 4.4 MMOL/L — SIGNIFICANT CHANGE UP (ref 3.5–5.3)
POTASSIUM SERPL-SCNC: 3.4 MMOL/L — LOW (ref 3.5–5.3)
POTASSIUM SERPL-SCNC: 3.9 MMOL/L — SIGNIFICANT CHANGE UP (ref 3.5–5.3)
POTASSIUM SERPL-SCNC: 4.1 MMOL/L — SIGNIFICANT CHANGE UP (ref 3.5–5.3)
POTASSIUM SERPL-SCNC: 4.3 MMOL/L — SIGNIFICANT CHANGE UP (ref 3.5–5.3)
POTASSIUM SERPL-SCNC: 4.4 MMOL/L — SIGNIFICANT CHANGE UP (ref 3.5–5.3)
POTASSIUM SERPL-SCNC: 5 MMOL/L
PROCALCITONIN SERPL-MCNC: 0.99 NG/ML — HIGH (ref 0.02–0.1)
PROT SERPL-MCNC: 6.2 G/DL
PROT SERPL-MCNC: 6.2 G/DL — SIGNIFICANT CHANGE UP (ref 6–8.3)
PROT SERPL-MCNC: 6.5 G/DL — SIGNIFICANT CHANGE UP (ref 6–8.3)
PROT UR-MCNC: ABNORMAL
PROT UR-MCNC: SIGNIFICANT CHANGE UP
PROTHROM AB SERPL-ACNC: 19.9 SEC — HIGH (ref 10.5–13.4)
PSA SERPL-MCNC: 0.58 NG/ML
RAPID RVP RESULT: DETECTED
RBC # BLD: 3.65 M/UL — LOW (ref 4.2–5.8)
RBC # BLD: 3.79 M/UL — LOW (ref 4.2–5.8)
RBC # BLD: 3.82 M/UL — LOW (ref 4.2–5.8)
RBC # BLD: 3.92 M/UL — LOW (ref 4.2–5.8)
RBC # BLD: 3.97 M/UL — LOW (ref 4.2–5.8)
RBC # BLD: 3.99 M/UL — LOW (ref 4.2–5.8)
RBC # BLD: 4.11 M/UL — LOW (ref 4.2–5.8)
RBC # BLD: 4.12 M/UL — LOW (ref 4.2–5.8)
RBC # BLD: 4.16 M/UL — LOW (ref 4.2–5.8)
RBC # BLD: 4.17 M/UL — LOW (ref 4.2–5.8)
RBC # BLD: 4.18 M/UL — LOW (ref 4.2–5.8)
RBC # BLD: 4.19 M/UL — LOW (ref 4.2–5.8)
RBC # BLD: 4.19 M/UL — LOW (ref 4.2–5.8)
RBC # BLD: 4.56 M/UL
RBC # BLD: 4.67 M/UL — SIGNIFICANT CHANGE UP (ref 4.2–5.8)
RBC # FLD: 16.8 %
RBC # FLD: 16.8 % — HIGH (ref 10.3–14.5)
RBC # FLD: 16.9 % — HIGH (ref 10.3–14.5)
RBC # FLD: 17 % — HIGH (ref 10.3–14.5)
RBC # FLD: 17 % — HIGH (ref 10.3–14.5)
RBC # FLD: 17.1 % — HIGH (ref 10.3–14.5)
RBC # FLD: 17.1 % — HIGH (ref 10.3–14.5)
RBC # FLD: 17.2 % — HIGH (ref 10.3–14.5)
RBC # FLD: 18.1 % — HIGH (ref 10.3–14.5)
RBC # FLD: 18.5 % — HIGH (ref 10.3–14.5)
RBC BLD AUTO: ABNORMAL
RBC CASTS # UR COMP ASSIST: 20 /HPF — HIGH (ref 0–4)
RETICS #: 73.3 K/UL — SIGNIFICANT CHANGE UP (ref 25–125)
RETICS/RBC NFR: 1.8 % — SIGNIFICANT CHANGE UP (ref 0.5–2.5)
RH IG SCN BLD-IMP: POSITIVE — SIGNIFICANT CHANGE UP
RV+EV RNA SPEC QL NAA+PROBE: DETECTED
S PNEUM AG UR QL: NEGATIVE — SIGNIFICANT CHANGE UP
SAO2 % BLDV: 15.3 % — LOW (ref 67–88)
SAO2 % BLDV: 26.7 % — LOW (ref 67–88)
SAO2 % BLDV: 56.4 % — LOW (ref 67–88)
SARS-COV-2 AB SERPL IA-ACNC: >250 U/ML
SARS-COV-2 AB SERPL QL IA: 0.08 INDEX
SARS-COV-2 RNA SPEC QL NAA+PROBE: SIGNIFICANT CHANGE UP
SARS-COV-2 RNA SPEC QL NAA+PROBE: SIGNIFICANT CHANGE UP
SODIUM SERPL-SCNC: 131 MMOL/L — LOW (ref 135–145)
SODIUM SERPL-SCNC: 133 MMOL/L — LOW (ref 135–145)
SODIUM SERPL-SCNC: 133 MMOL/L — LOW (ref 135–145)
SODIUM SERPL-SCNC: 134 MMOL/L — LOW (ref 135–145)
SODIUM SERPL-SCNC: 136 MMOL/L
SODIUM SERPL-SCNC: 137 MMOL/L — SIGNIFICANT CHANGE UP (ref 135–145)
SP GR SPEC: 1.01 — SIGNIFICANT CHANGE UP (ref 1.01–1.02)
SP GR SPEC: 1.03 — HIGH (ref 1.01–1.02)
SPECIMEN SOURCE: SIGNIFICANT CHANGE UP
SPECIMEN SOURCE: SIGNIFICANT CHANGE UP
TIBC SERPL-MCNC: 273 UG/DL — SIGNIFICANT CHANGE UP (ref 220–430)
TIBC SERPL-MCNC: 287 UG/DL — SIGNIFICANT CHANGE UP (ref 220–430)
TRANSFERRIN SERPL-MCNC: 213 MG/DL — SIGNIFICANT CHANGE UP (ref 200–360)
TRIGL SERPL-MCNC: 104 MG/DL
TSH SERPL-ACNC: 0.71 UIU/ML
UIBC SERPL-MCNC: 258 UG/DL — SIGNIFICANT CHANGE UP (ref 110–370)
UIBC SERPL-MCNC: 269 UG/DL — SIGNIFICANT CHANGE UP (ref 110–370)
UROBILINOGEN FLD QL: NEGATIVE — SIGNIFICANT CHANGE UP
UROBILINOGEN FLD QL: NEGATIVE — SIGNIFICANT CHANGE UP
VIT B12 SERPL-MCNC: >2000 PG/ML
VIT B12 SERPL-MCNC: >2000 PG/ML — HIGH (ref 232–1245)
WBC # BLD: 11.02 K/UL — HIGH (ref 3.8–10.5)
WBC # BLD: 6.39 K/UL — SIGNIFICANT CHANGE UP (ref 3.8–10.5)
WBC # BLD: 7.44 K/UL — SIGNIFICANT CHANGE UP (ref 3.8–10.5)
WBC # BLD: 7.62 K/UL — SIGNIFICANT CHANGE UP (ref 3.8–10.5)
WBC # BLD: 7.94 K/UL — SIGNIFICANT CHANGE UP (ref 3.8–10.5)
WBC # BLD: 7.95 K/UL — SIGNIFICANT CHANGE UP (ref 3.8–10.5)
WBC # BLD: 7.99 K/UL — SIGNIFICANT CHANGE UP (ref 3.8–10.5)
WBC # BLD: 8.2 K/UL — SIGNIFICANT CHANGE UP (ref 3.8–10.5)
WBC # BLD: 8.56 K/UL — SIGNIFICANT CHANGE UP (ref 3.8–10.5)
WBC # BLD: 9 K/UL — SIGNIFICANT CHANGE UP (ref 3.8–10.5)
WBC # BLD: 9.45 K/UL — SIGNIFICANT CHANGE UP (ref 3.8–10.5)
WBC # BLD: 9.48 K/UL — SIGNIFICANT CHANGE UP (ref 3.8–10.5)
WBC # BLD: 9.85 K/UL — SIGNIFICANT CHANGE UP (ref 3.8–10.5)
WBC # FLD AUTO: 11.02 K/UL — HIGH (ref 3.8–10.5)
WBC # FLD AUTO: 6.39 K/UL — SIGNIFICANT CHANGE UP (ref 3.8–10.5)
WBC # FLD AUTO: 6.51 K/UL
WBC # FLD AUTO: 7.44 K/UL — SIGNIFICANT CHANGE UP (ref 3.8–10.5)
WBC # FLD AUTO: 7.62 K/UL — SIGNIFICANT CHANGE UP (ref 3.8–10.5)
WBC # FLD AUTO: 7.94 K/UL — SIGNIFICANT CHANGE UP (ref 3.8–10.5)
WBC # FLD AUTO: 7.95 K/UL — SIGNIFICANT CHANGE UP (ref 3.8–10.5)
WBC # FLD AUTO: 7.99 K/UL — SIGNIFICANT CHANGE UP (ref 3.8–10.5)
WBC # FLD AUTO: 8.2 K/UL — SIGNIFICANT CHANGE UP (ref 3.8–10.5)
WBC # FLD AUTO: 8.56 K/UL — SIGNIFICANT CHANGE UP (ref 3.8–10.5)
WBC # FLD AUTO: 9 K/UL — SIGNIFICANT CHANGE UP (ref 3.8–10.5)
WBC # FLD AUTO: 9.45 K/UL — SIGNIFICANT CHANGE UP (ref 3.8–10.5)
WBC # FLD AUTO: 9.48 K/UL — SIGNIFICANT CHANGE UP (ref 3.8–10.5)
WBC # FLD AUTO: 9.85 K/UL — SIGNIFICANT CHANGE UP (ref 3.8–10.5)
WBC UR QL: 2 /HPF — SIGNIFICANT CHANGE UP (ref 0–5)

## 2022-01-01 PROCEDURE — 99233 SBSQ HOSP IP/OBS HIGH 50: CPT

## 2022-01-01 PROCEDURE — 99214 OFFICE O/P EST MOD 30 MIN: CPT | Mod: 25

## 2022-01-01 PROCEDURE — 80048 BASIC METABOLIC PNL TOTAL CA: CPT

## 2022-01-01 PROCEDURE — 83735 ASSAY OF MAGNESIUM: CPT

## 2022-01-01 PROCEDURE — 0225U NFCT DS DNA&RNA 21 SARSCOV2: CPT

## 2022-01-01 PROCEDURE — 83036 HEMOGLOBIN GLYCOSYLATED A1C: CPT

## 2022-01-01 PROCEDURE — 85610 PROTHROMBIN TIME: CPT

## 2022-01-01 PROCEDURE — 99285 EMERGENCY DEPT VISIT HI MDM: CPT

## 2022-01-01 PROCEDURE — 99232 SBSQ HOSP IP/OBS MODERATE 35: CPT

## 2022-01-01 PROCEDURE — 85018 HEMOGLOBIN: CPT

## 2022-01-01 PROCEDURE — 99223 1ST HOSP IP/OBS HIGH 75: CPT | Mod: GC

## 2022-01-01 PROCEDURE — 82435 ASSAY OF BLOOD CHLORIDE: CPT

## 2022-01-01 PROCEDURE — 96375 TX/PRO/DX INJ NEW DRUG ADDON: CPT

## 2022-01-01 PROCEDURE — 74230 X-RAY XM SWLNG FUNCJ C+: CPT

## 2022-01-01 PROCEDURE — 96372 THER/PROPH/DIAG INJ SC/IM: CPT

## 2022-01-01 PROCEDURE — 99232 SBSQ HOSP IP/OBS MODERATE 35: CPT | Mod: GC

## 2022-01-01 PROCEDURE — 99239 HOSP IP/OBS DSCHRG MGMT >30: CPT

## 2022-01-01 PROCEDURE — 86901 BLOOD TYPING SEROLOGIC RH(D): CPT

## 2022-01-01 PROCEDURE — 85014 HEMATOCRIT: CPT

## 2022-01-01 PROCEDURE — 82962 GLUCOSE BLOOD TEST: CPT

## 2022-01-01 PROCEDURE — 84295 ASSAY OF SERUM SODIUM: CPT

## 2022-01-01 PROCEDURE — 82607 VITAMIN B-12: CPT

## 2022-01-01 PROCEDURE — 99233 SBSQ HOSP IP/OBS HIGH 50: CPT | Mod: GC

## 2022-01-01 PROCEDURE — 94640 AIRWAY INHALATION TREATMENT: CPT

## 2022-01-01 PROCEDURE — 82746 ASSAY OF FOLIC ACID SERUM: CPT

## 2022-01-01 PROCEDURE — 84132 ASSAY OF SERUM POTASSIUM: CPT

## 2022-01-01 PROCEDURE — 71275 CT ANGIOGRAPHY CHEST: CPT | Mod: 26,QQ

## 2022-01-01 PROCEDURE — 82947 ASSAY GLUCOSE BLOOD QUANT: CPT

## 2022-01-01 PROCEDURE — 87899 AGENT NOS ASSAY W/OPTIC: CPT

## 2022-01-01 PROCEDURE — 86738 MYCOPLASMA ANTIBODY: CPT

## 2022-01-01 PROCEDURE — 71045 X-RAY EXAM CHEST 1 VIEW: CPT | Mod: 26

## 2022-01-01 PROCEDURE — 82728 ASSAY OF FERRITIN: CPT

## 2022-01-01 PROCEDURE — 92611 MOTION FLUOROSCOPY/SWALLOW: CPT

## 2022-01-01 PROCEDURE — 84100 ASSAY OF PHOSPHORUS: CPT

## 2022-01-01 PROCEDURE — 85730 THROMBOPLASTIN TIME PARTIAL: CPT

## 2022-01-01 PROCEDURE — 82803 BLOOD GASES ANY COMBINATION: CPT

## 2022-01-01 PROCEDURE — 99214 OFFICE O/P EST MOD 30 MIN: CPT | Mod: 95

## 2022-01-01 PROCEDURE — 84466 ASSAY OF TRANSFERRIN: CPT

## 2022-01-01 PROCEDURE — 93010 ELECTROCARDIOGRAM REPORT: CPT

## 2022-01-01 PROCEDURE — 85025 COMPLETE CBC W/AUTO DIFF WBC: CPT

## 2022-01-01 PROCEDURE — 99222 1ST HOSP IP/OBS MODERATE 55: CPT | Mod: GC

## 2022-01-01 PROCEDURE — 83540 ASSAY OF IRON: CPT

## 2022-01-01 PROCEDURE — 85045 AUTOMATED RETICULOCYTE COUNT: CPT

## 2022-01-01 PROCEDURE — 81001 URINALYSIS AUTO W/SCOPE: CPT

## 2022-01-01 PROCEDURE — 99223 1ST HOSP IP/OBS HIGH 75: CPT

## 2022-01-01 PROCEDURE — 97530 THERAPEUTIC ACTIVITIES: CPT

## 2022-01-01 PROCEDURE — 36415 COLL VENOUS BLD VENIPUNCTURE: CPT

## 2022-01-01 PROCEDURE — 82330 ASSAY OF CALCIUM: CPT

## 2022-01-01 PROCEDURE — 94010 BREATHING CAPACITY TEST: CPT

## 2022-01-01 PROCEDURE — 93005 ELECTROCARDIOGRAM TRACING: CPT

## 2022-01-01 PROCEDURE — 71045 X-RAY EXAM CHEST 1 VIEW: CPT

## 2022-01-01 PROCEDURE — G0439: CPT

## 2022-01-01 PROCEDURE — 97110 THERAPEUTIC EXERCISES: CPT

## 2022-01-01 PROCEDURE — 94664 DEMO&/EVAL PT USE INHALER: CPT

## 2022-01-01 PROCEDURE — 80053 COMPREHEN METABOLIC PANEL: CPT

## 2022-01-01 PROCEDURE — 95012 NITRIC OXIDE EXP GAS DETER: CPT

## 2022-01-01 PROCEDURE — 99215 OFFICE O/P EST HI 40 MIN: CPT | Mod: 25,95

## 2022-01-01 PROCEDURE — 82565 ASSAY OF CREATININE: CPT

## 2022-01-01 PROCEDURE — 87449 NOS EACH ORGANISM AG IA: CPT

## 2022-01-01 PROCEDURE — 99285 EMERGENCY DEPT VISIT HI MDM: CPT | Mod: CS,GC

## 2022-01-01 PROCEDURE — 84145 PROCALCITONIN (PCT): CPT

## 2022-01-01 PROCEDURE — U0005: CPT

## 2022-01-01 PROCEDURE — 83550 IRON BINDING TEST: CPT

## 2022-01-01 PROCEDURE — 82272 OCCULT BLD FECES 1-3 TESTS: CPT

## 2022-01-01 PROCEDURE — 96374 THER/PROPH/DIAG INJ IV PUSH: CPT | Mod: XU

## 2022-01-01 PROCEDURE — U0003: CPT

## 2022-01-01 PROCEDURE — 97162 PT EVAL MOD COMPLEX 30 MIN: CPT

## 2022-01-01 PROCEDURE — 86850 RBC ANTIBODY SCREEN: CPT

## 2022-01-01 PROCEDURE — 74230 X-RAY XM SWLNG FUNCJ C+: CPT | Mod: 26

## 2022-01-01 PROCEDURE — 87040 BLOOD CULTURE FOR BACTERIA: CPT

## 2022-01-01 PROCEDURE — 71275 CT ANGIOGRAPHY CHEST: CPT | Mod: QQ

## 2022-01-01 PROCEDURE — 86900 BLOOD TYPING SEROLOGIC ABO: CPT

## 2022-01-01 PROCEDURE — 83605 ASSAY OF LACTIC ACID: CPT

## 2022-01-01 PROCEDURE — 85027 COMPLETE CBC AUTOMATED: CPT

## 2022-01-01 RX ORDER — AZTREONAM 2 G
2000 VIAL (EA) INJECTION EVERY 8 HOURS
Refills: 0 | Status: DISCONTINUED | OUTPATIENT
Start: 2022-01-01 | End: 2022-01-01

## 2022-01-01 RX ORDER — POLYETHYLENE GLYCOL 3350 17 G/17G
17 POWDER, FOR SOLUTION ORAL
Qty: 0 | Refills: 0 | DISCHARGE
Start: 2022-01-01

## 2022-01-01 RX ORDER — CYANOCOBALAMIN 1000 UG/ML
1000 INJECTION INTRAMUSCULAR; SUBCUTANEOUS
Qty: 0 | Refills: 0 | Status: COMPLETED | OUTPATIENT
Start: 2022-01-01

## 2022-01-01 RX ORDER — METRONIDAZOLE 500 MG
500 TABLET ORAL EVERY 8 HOURS
Refills: 0 | Status: DISCONTINUED | OUTPATIENT
Start: 2022-01-01 | End: 2022-01-01

## 2022-01-01 RX ORDER — ALBUTEROL 90 UG/1
2 AEROSOL, METERED ORAL
Qty: 0 | Refills: 0 | DISCHARGE

## 2022-01-01 RX ORDER — INSULIN GLARGINE 100 [IU]/ML
10 INJECTION, SOLUTION SUBCUTANEOUS EVERY MORNING
Refills: 0 | Status: DISCONTINUED | OUTPATIENT
Start: 2022-01-01 | End: 2022-01-01

## 2022-01-01 RX ORDER — SODIUM CHLORIDE 9 MG/ML
2100 INJECTION, SOLUTION INTRAVENOUS ONCE
Refills: 0 | Status: DISCONTINUED | OUTPATIENT
Start: 2022-01-01 | End: 2022-01-01

## 2022-01-01 RX ORDER — SODIUM CHLORIDE 9 MG/ML
4 INJECTION INTRAMUSCULAR; INTRAVENOUS; SUBCUTANEOUS EVERY 12 HOURS
Refills: 0 | Status: DISCONTINUED | OUTPATIENT
Start: 2022-01-01 | End: 2022-01-01

## 2022-01-01 RX ORDER — INSULIN LISPRO 100/ML
VIAL (ML) SUBCUTANEOUS EVERY 6 HOURS
Refills: 0 | Status: DISCONTINUED | OUTPATIENT
Start: 2022-01-01 | End: 2022-01-01

## 2022-01-01 RX ORDER — DEXTROSE 50 % IN WATER 50 %
50 SYRINGE (ML) INTRAVENOUS ONCE
Refills: 0 | Status: COMPLETED | OUTPATIENT
Start: 2022-01-01 | End: 2022-01-01

## 2022-01-01 RX ORDER — PANTOPRAZOLE SODIUM 20 MG/1
40 TABLET, DELAYED RELEASE ORAL EVERY 12 HOURS
Refills: 0 | Status: DISCONTINUED | OUTPATIENT
Start: 2022-01-01 | End: 2022-01-01

## 2022-01-01 RX ORDER — IPRATROPIUM/ALBUTEROL SULFATE 18-103MCG
3 AEROSOL WITH ADAPTER (GRAM) INHALATION
Qty: 0 | Refills: 0 | DISCHARGE
Start: 2022-01-01

## 2022-01-01 RX ORDER — LINAGLIPTIN 5 MG/1
5 TABLET, FILM COATED ORAL
Qty: 90 | Refills: 0 | Status: ACTIVE | COMMUNITY
Start: 2021-01-01

## 2022-01-01 RX ORDER — BUDESONIDE AND FORMOTEROL FUMARATE DIHYDRATE 160; 4.5 UG/1; UG/1
2 AEROSOL RESPIRATORY (INHALATION)
Refills: 0 | Status: DISCONTINUED | OUTPATIENT
Start: 2022-01-01 | End: 2022-01-01

## 2022-01-01 RX ORDER — IPRATROPIUM BROMIDE 21 MCG
2 AEROSOL, SPRAY (ML) NASAL
Qty: 0 | Refills: 0 | DISCHARGE

## 2022-01-01 RX ORDER — INSULIN LISPRO 100/ML
VIAL (ML) SUBCUTANEOUS AT BEDTIME
Refills: 0 | Status: DISCONTINUED | OUTPATIENT
Start: 2022-01-01 | End: 2022-01-01

## 2022-01-01 RX ORDER — OMEPRAZOLE 10 MG/1
1 CAPSULE, DELAYED RELEASE ORAL
Qty: 0 | Refills: 0 | DISCHARGE

## 2022-01-01 RX ORDER — METFORMIN HYDROCHLORIDE 850 MG/1
1 TABLET ORAL
Qty: 0 | Refills: 0 | DISCHARGE

## 2022-01-01 RX ORDER — CITALOPRAM HYDROBROMIDE 10 MG/1
10 TABLET, FILM COATED ORAL
Qty: 30 | Refills: 0 | Status: DISCONTINUED | COMMUNITY
Start: 2022-01-01 | End: 2022-01-01

## 2022-01-01 RX ORDER — HEPARIN SODIUM 5000 [USP'U]/ML
INJECTION INTRAVENOUS; SUBCUTANEOUS
Qty: 25000 | Refills: 0 | Status: DISCONTINUED | OUTPATIENT
Start: 2022-01-01 | End: 2022-01-01

## 2022-01-01 RX ORDER — SODIUM CHLORIDE 9 MG/ML
1000 INJECTION INTRAMUSCULAR; INTRAVENOUS; SUBCUTANEOUS ONCE
Refills: 0 | Status: COMPLETED | OUTPATIENT
Start: 2022-01-01 | End: 2022-01-01

## 2022-01-01 RX ORDER — SODIUM CHLORIDE 9 MG/ML
1000 INJECTION, SOLUTION INTRAVENOUS
Refills: 0 | Status: DISCONTINUED | OUTPATIENT
Start: 2022-01-01 | End: 2022-01-01

## 2022-01-01 RX ORDER — PREGABALIN 100 MG/1
100 CAPSULE ORAL
Qty: 90 | Refills: 0 | Status: ACTIVE | COMMUNITY
Start: 2022-01-01

## 2022-01-01 RX ORDER — INSULIN GLARGINE 100 [IU]/ML
8 INJECTION, SOLUTION SUBCUTANEOUS EVERY MORNING
Refills: 0 | Status: DISCONTINUED | OUTPATIENT
Start: 2022-01-01 | End: 2022-01-01

## 2022-01-01 RX ORDER — CITALOPRAM HYDROBROMIDE 20 MG/1
20 TABLET, FILM COATED ORAL
Qty: 30 | Refills: 0 | Status: ACTIVE | COMMUNITY
Start: 2022-01-01

## 2022-01-01 RX ORDER — VANCOMYCIN HCL 1 G
1000 VIAL (EA) INTRAVENOUS ONCE
Refills: 0 | Status: COMPLETED | OUTPATIENT
Start: 2022-01-01 | End: 2022-01-01

## 2022-01-01 RX ORDER — INSULIN GLARGINE 100 [IU]/ML
2 INJECTION, SOLUTION SUBCUTANEOUS EVERY MORNING
Refills: 0 | Status: DISCONTINUED | OUTPATIENT
Start: 2022-01-01 | End: 2022-01-01

## 2022-01-01 RX ORDER — MAGNESIUM OXIDE 400 MG ORAL TABLET 241.3 MG
400 TABLET ORAL ONCE
Refills: 0 | Status: COMPLETED | OUTPATIENT
Start: 2022-01-01 | End: 2022-01-01

## 2022-01-01 RX ORDER — VANCOMYCIN HCL 1 G
750 VIAL (EA) INTRAVENOUS EVERY 12 HOURS
Refills: 0 | Status: DISCONTINUED | OUTPATIENT
Start: 2022-01-01 | End: 2022-01-01

## 2022-01-01 RX ORDER — GLIPIZIDE 10 MG/1
10 TABLET, FILM COATED, EXTENDED RELEASE ORAL
Qty: 180 | Refills: 0 | Status: DISCONTINUED | COMMUNITY
Start: 2016-10-20 | End: 2022-01-01

## 2022-01-01 RX ORDER — ACETAMINOPHEN 500 MG
975 TABLET ORAL ONCE
Refills: 0 | Status: COMPLETED | OUTPATIENT
Start: 2022-01-01 | End: 2022-01-01

## 2022-01-01 RX ORDER — POLYETHYLENE GLYCOL 3350 17 G/17G
17 POWDER, FOR SOLUTION ORAL
Refills: 0 | Status: DISCONTINUED | OUTPATIENT
Start: 2022-01-01 | End: 2022-01-01

## 2022-01-01 RX ORDER — FLUTICASONE FUROATE AND VILANTEROL TRIFENATATE 100; 25 UG/1; UG/1
1 POWDER RESPIRATORY (INHALATION)
Qty: 0 | Refills: 0 | DISCHARGE

## 2022-01-01 RX ORDER — PANTOPRAZOLE SODIUM 20 MG/1
40 TABLET, DELAYED RELEASE ORAL
Refills: 0 | Status: DISCONTINUED | OUTPATIENT
Start: 2022-01-01 | End: 2022-01-01

## 2022-01-01 RX ORDER — TIOTROPIUM BROMIDE 18 UG/1
1 CAPSULE ORAL; RESPIRATORY (INHALATION) DAILY
Refills: 0 | Status: DISCONTINUED | OUTPATIENT
Start: 2022-01-01 | End: 2022-01-01

## 2022-01-01 RX ORDER — MUCUS CLEARING DEVICE
EACH MISCELLANEOUS
Qty: 1 | Refills: 0 | Status: ACTIVE | OUTPATIENT
Start: 2022-01-01

## 2022-01-01 RX ORDER — IPRATROPIUM/ALBUTEROL SULFATE 18-103MCG
3 AEROSOL WITH ADAPTER (GRAM) INHALATION EVERY 6 HOURS
Refills: 0 | Status: DISCONTINUED | OUTPATIENT
Start: 2022-01-01 | End: 2022-01-01

## 2022-01-01 RX ORDER — IPRATROPIUM/ALBUTEROL SULFATE 18-103MCG
3 AEROSOL WITH ADAPTER (GRAM) INHALATION ONCE
Refills: 0 | Status: COMPLETED | OUTPATIENT
Start: 2022-01-01 | End: 2022-01-01

## 2022-01-01 RX ORDER — UMECLIDINIUM 62.5 UG/1
1 AEROSOL, POWDER ORAL
Qty: 0 | Refills: 0 | DISCHARGE

## 2022-01-01 RX ORDER — IRON SUCROSE 20 MG/ML
200 INJECTION, SOLUTION INTRAVENOUS
Refills: 0 | Status: DISCONTINUED | OUTPATIENT
Start: 2022-01-01 | End: 2022-01-01

## 2022-01-01 RX ORDER — SENNA PLUS 8.6 MG/1
2 TABLET ORAL
Qty: 0 | Refills: 0 | DISCHARGE
Start: 2022-01-01

## 2022-01-01 RX ORDER — CHOLECALCIFEROL (VITAMIN D3) 125 MCG
1 CAPSULE ORAL
Qty: 0 | Refills: 0 | DISCHARGE

## 2022-01-01 RX ORDER — SODIUM CHLORIDE 9 MG/ML
2000 INJECTION INTRAMUSCULAR; INTRAVENOUS; SUBCUTANEOUS ONCE
Refills: 0 | Status: DISCONTINUED | OUTPATIENT
Start: 2022-01-01 | End: 2022-01-01

## 2022-01-01 RX ORDER — ERGOCALCIFEROL 1.25 MG/1
1.25 MG CAPSULE, LIQUID FILLED ORAL
Qty: 13 | Refills: 0 | Status: ACTIVE | COMMUNITY
Start: 2021-06-04 | End: 1900-01-01

## 2022-01-01 RX ORDER — DEXTROSE 50 % IN WATER 50 %
25 SYRINGE (ML) INTRAVENOUS ONCE
Refills: 0 | Status: DISCONTINUED | OUTPATIENT
Start: 2022-01-01 | End: 2022-01-01

## 2022-01-01 RX ORDER — CYCLOBENZAPRINE HYDROCHLORIDE 10 MG/1
5 TABLET, FILM COATED ORAL THREE TIMES A DAY
Refills: 0 | Status: DISCONTINUED | OUTPATIENT
Start: 2022-01-01 | End: 2022-01-01

## 2022-01-01 RX ORDER — LOVASTATIN 20 MG
1 TABLET ORAL
Qty: 0 | Refills: 0 | DISCHARGE

## 2022-01-01 RX ORDER — SITAGLIPTIN 50 MG/1
1 TABLET, FILM COATED ORAL
Qty: 0 | Refills: 0 | DISCHARGE

## 2022-01-01 RX ORDER — ALBUTEROL SULFATE 90 UG/1
108 (90 BASE) INHALANT RESPIRATORY (INHALATION)
Qty: 2 | Refills: 1 | Status: ACTIVE | COMMUNITY
Start: 2019-11-14 | End: 1900-01-01

## 2022-01-01 RX ORDER — CEFEPIME 1 G/1
2000 INJECTION, POWDER, FOR SOLUTION INTRAMUSCULAR; INTRAVENOUS EVERY 12 HOURS
Refills: 0 | Status: DISCONTINUED | OUTPATIENT
Start: 2022-01-01 | End: 2022-01-01

## 2022-01-01 RX ORDER — HEPARIN SODIUM 5000 [USP'U]/ML
4700 INJECTION INTRAVENOUS; SUBCUTANEOUS EVERY 6 HOURS
Refills: 0 | Status: DISCONTINUED | OUTPATIENT
Start: 2022-01-01 | End: 2022-01-01

## 2022-01-01 RX ORDER — DEXTROSE 50 % IN WATER 50 %
12.5 SYRINGE (ML) INTRAVENOUS ONCE
Refills: 0 | Status: DISCONTINUED | OUTPATIENT
Start: 2022-01-01 | End: 2022-01-01

## 2022-01-01 RX ORDER — UMECLIDINIUM 62.5 UG/1
62.5 AEROSOL, POWDER ORAL
Qty: 3 | Refills: 1 | Status: ACTIVE | COMMUNITY
Start: 2021-01-13 | End: 1900-01-01

## 2022-01-01 RX ORDER — CEFEPIME 1 G/1
2000 INJECTION, POWDER, FOR SOLUTION INTRAMUSCULAR; INTRAVENOUS EVERY 8 HOURS
Refills: 0 | Status: DISCONTINUED | OUTPATIENT
Start: 2022-01-01 | End: 2022-01-01

## 2022-01-01 RX ORDER — POLYETHYLENE GLYCOL 3350 17 G/17G
17 POWDER, FOR SOLUTION ORAL ONCE
Refills: 0 | Status: COMPLETED | OUTPATIENT
Start: 2022-01-01 | End: 2022-01-01

## 2022-01-01 RX ORDER — TELMISARTAN 20 MG/1
1 TABLET ORAL
Qty: 0 | Refills: 0 | DISCHARGE

## 2022-01-01 RX ORDER — ACETYLCYSTEINE 200 MG/ML
4 VIAL (ML) MISCELLANEOUS EVERY 8 HOURS
Refills: 0 | Status: DISCONTINUED | OUTPATIENT
Start: 2022-01-01 | End: 2022-01-01

## 2022-01-01 RX ORDER — GLUCAGON INJECTION, SOLUTION 0.5 MG/.1ML
1 INJECTION, SOLUTION SUBCUTANEOUS ONCE
Refills: 0 | Status: DISCONTINUED | OUTPATIENT
Start: 2022-01-01 | End: 2022-01-01

## 2022-01-01 RX ORDER — DEXTROSE 50 % IN WATER 50 %
15 SYRINGE (ML) INTRAVENOUS ONCE
Refills: 0 | Status: DISCONTINUED | OUTPATIENT
Start: 2022-01-01 | End: 2022-01-01

## 2022-01-01 RX ORDER — ATORVASTATIN CALCIUM 80 MG/1
40 TABLET, FILM COATED ORAL AT BEDTIME
Refills: 0 | Status: DISCONTINUED | OUTPATIENT
Start: 2022-01-01 | End: 2022-01-01

## 2022-01-01 RX ORDER — PREDNISONE 10 MG/1
10 TABLET ORAL
Qty: 21 | Refills: 0 | Status: ACTIVE | COMMUNITY
Start: 2022-01-01 | End: 1900-01-01

## 2022-01-01 RX ORDER — GLIPIZIDE 5 MG/1
5 TABLET, FILM COATED, EXTENDED RELEASE ORAL
Qty: 90 | Refills: 0 | Status: ACTIVE | COMMUNITY
Start: 2021-01-01

## 2022-01-01 RX ORDER — SENNA PLUS 8.6 MG/1
2 TABLET ORAL AT BEDTIME
Refills: 0 | Status: DISCONTINUED | OUTPATIENT
Start: 2022-01-01 | End: 2022-01-01

## 2022-01-01 RX ORDER — INSULIN HUMAN 100 [IU]/ML
10 INJECTION, SOLUTION SUBCUTANEOUS ONCE
Refills: 0 | Status: COMPLETED | OUTPATIENT
Start: 2022-01-01 | End: 2022-01-01

## 2022-01-01 RX ORDER — LINAGLIPTIN 5 MG/1
1 TABLET, FILM COATED ORAL
Qty: 0 | Refills: 0 | DISCHARGE

## 2022-01-01 RX ORDER — SODIUM CHLORIDE 9 MG/ML
1000 INJECTION INTRAMUSCULAR; INTRAVENOUS; SUBCUTANEOUS
Refills: 0 | Status: DISCONTINUED | OUTPATIENT
Start: 2022-01-01 | End: 2022-01-01

## 2022-01-01 RX ORDER — POTASSIUM CHLORIDE 20 MEQ
40 PACKET (EA) ORAL ONCE
Refills: 0 | Status: COMPLETED | OUTPATIENT
Start: 2022-01-01 | End: 2022-01-01

## 2022-01-01 RX ORDER — CITALOPRAM 10 MG/1
20 TABLET, FILM COATED ORAL DAILY
Refills: 0 | Status: DISCONTINUED | OUTPATIENT
Start: 2022-01-01 | End: 2022-01-01

## 2022-01-01 RX ORDER — PANTOPRAZOLE SODIUM 20 MG/1
80 TABLET, DELAYED RELEASE ORAL ONCE
Refills: 0 | Status: COMPLETED | OUTPATIENT
Start: 2022-01-01 | End: 2022-01-01

## 2022-01-01 RX ORDER — CYCLOBENZAPRINE HYDROCHLORIDE 10 MG/1
1 TABLET, FILM COATED ORAL
Qty: 0 | Refills: 0 | DISCHARGE

## 2022-01-01 RX ORDER — ALBUTEROL SULFATE 2.5 MG/3ML
(2.5 MG/3ML) SOLUTION RESPIRATORY (INHALATION) 4 TIMES DAILY
Qty: 360 | Refills: 1 | Status: ACTIVE | COMMUNITY
Start: 2022-01-01 | End: 1900-01-01

## 2022-01-01 RX ORDER — INSULIN LISPRO 100/ML
VIAL (ML) SUBCUTANEOUS
Refills: 0 | Status: DISCONTINUED | OUTPATIENT
Start: 2022-01-01 | End: 2022-01-01

## 2022-01-01 RX ORDER — ATENOLOL 25 MG/1
1 TABLET ORAL
Qty: 0 | Refills: 0 | DISCHARGE

## 2022-01-01 RX ORDER — MAGNESIUM SULFATE 500 MG/ML
2 VIAL (ML) INJECTION ONCE
Refills: 0 | Status: COMPLETED | OUTPATIENT
Start: 2022-01-01 | End: 2022-01-01

## 2022-01-01 RX ORDER — FLUTICASONE FUROATE AND VILANTEROL TRIFENATATE 200; 25 UG/1; UG/1
200-25 POWDER RESPIRATORY (INHALATION) DAILY
Qty: 3 | Refills: 1 | Status: ACTIVE | COMMUNITY
Start: 2018-01-03 | End: 1900-01-01

## 2022-01-01 RX ORDER — LORAZEPAM 2 MG/1
TABLET ORAL
Refills: 0 | Status: DISCONTINUED | COMMUNITY
End: 2022-01-01

## 2022-01-01 RX ORDER — ARIPIPRAZOLE 5 MG/1
5 TABLET ORAL DAILY
Qty: 90 | Refills: 0 | Status: ACTIVE | COMMUNITY
Start: 2022-01-01 | End: 1900-01-01

## 2022-01-01 RX ORDER — FOLIC ACID 1 MG/1
1 TABLET ORAL
Qty: 90 | Refills: 0 | Status: ACTIVE | COMMUNITY
Start: 2022-01-01 | End: 1900-01-01

## 2022-01-01 RX ORDER — APIXABAN 2.5 MG/1
5 TABLET, FILM COATED ORAL
Refills: 0 | Status: DISCONTINUED | OUTPATIENT
Start: 2022-01-01 | End: 2022-01-01

## 2022-01-01 RX ORDER — APIXABAN 2.5 MG/1
1 TABLET, FILM COATED ORAL
Qty: 0 | Refills: 0 | DISCHARGE

## 2022-01-01 RX ORDER — CITALOPRAM 10 MG/1
1 TABLET, FILM COATED ORAL
Qty: 0 | Refills: 0 | DISCHARGE

## 2022-01-01 RX ORDER — CEFEPIME 1 G/1
2000 INJECTION, POWDER, FOR SOLUTION INTRAMUSCULAR; INTRAVENOUS ONCE
Refills: 0 | Status: COMPLETED | OUTPATIENT
Start: 2022-01-01 | End: 2022-01-01

## 2022-01-01 RX ORDER — IRON SUCROSE 20 MG/ML
200 INJECTION, SOLUTION INTRAVENOUS EVERY 24 HOURS
Refills: 0 | Status: COMPLETED | OUTPATIENT
Start: 2022-01-01 | End: 2022-01-01

## 2022-01-01 RX ORDER — INSULIN GLARGINE 100 [IU]/ML
12 INJECTION, SOLUTION SUBCUTANEOUS EVERY MORNING
Refills: 0 | Status: DISCONTINUED | OUTPATIENT
Start: 2022-01-01 | End: 2022-01-01

## 2022-01-01 RX ORDER — METOPROLOL TARTRATE 50 MG
12.5 TABLET ORAL EVERY 12 HOURS
Refills: 0 | Status: DISCONTINUED | OUTPATIENT
Start: 2022-01-01 | End: 2022-01-01

## 2022-01-01 RX ORDER — CHLORHEXIDINE GLUCONATE 213 G/1000ML
1 SOLUTION TOPICAL DAILY
Refills: 0 | Status: DISCONTINUED | OUTPATIENT
Start: 2022-01-01 | End: 2022-01-01

## 2022-01-01 RX ORDER — INSULIN GLARGINE 100 [IU]/ML
5 INJECTION, SOLUTION SUBCUTANEOUS EVERY MORNING
Refills: 0 | Status: DISCONTINUED | OUTPATIENT
Start: 2022-01-01 | End: 2022-01-01

## 2022-01-01 RX ORDER — APIXABAN 2.5 MG/1
5 TABLET, FILM COATED ORAL EVERY 12 HOURS
Refills: 0 | Status: DISCONTINUED | OUTPATIENT
Start: 2022-01-01 | End: 2022-01-01

## 2022-01-01 RX ORDER — ACETAMINOPHEN 500 MG
650 TABLET ORAL EVERY 6 HOURS
Refills: 0 | Status: DISCONTINUED | OUTPATIENT
Start: 2022-01-01 | End: 2022-01-01

## 2022-01-01 RX ORDER — TAMSULOSIN HYDROCHLORIDE 0.4 MG/1
0.4 CAPSULE ORAL
Qty: 90 | Refills: 0 | Status: ACTIVE | COMMUNITY
Start: 2022-01-01 | End: 1900-01-01

## 2022-01-01 RX ADMIN — ATORVASTATIN CALCIUM 40 MILLIGRAM(S): 80 TABLET, FILM COATED ORAL at 21:38

## 2022-01-01 RX ADMIN — APIXABAN 5 MILLIGRAM(S): 2.5 TABLET, FILM COATED ORAL at 05:05

## 2022-01-01 RX ADMIN — INSULIN GLARGINE 12 UNIT(S): 100 INJECTION, SOLUTION SUBCUTANEOUS at 09:07

## 2022-01-01 RX ADMIN — Medication 40 MILLIGRAM(S): at 06:18

## 2022-01-01 RX ADMIN — PANTOPRAZOLE SODIUM 40 MILLIGRAM(S): 20 TABLET, DELAYED RELEASE ORAL at 17:46

## 2022-01-01 RX ADMIN — Medication 1: at 22:22

## 2022-01-01 RX ADMIN — ATORVASTATIN CALCIUM 40 MILLIGRAM(S): 80 TABLET, FILM COATED ORAL at 21:53

## 2022-01-01 RX ADMIN — Medication 2: at 16:46

## 2022-01-01 RX ADMIN — Medication 4 MILLILITER(S): at 13:19

## 2022-01-01 RX ADMIN — INSULIN HUMAN 10 UNIT(S): 100 INJECTION, SOLUTION SUBCUTANEOUS at 20:04

## 2022-01-01 RX ADMIN — Medication 12.5 MILLIGRAM(S): at 05:05

## 2022-01-01 RX ADMIN — Medication 12.5 MILLIGRAM(S): at 16:27

## 2022-01-01 RX ADMIN — Medication 4 MILLILITER(S): at 05:12

## 2022-01-01 RX ADMIN — CHLORHEXIDINE GLUCONATE 1 APPLICATION(S): 213 SOLUTION TOPICAL at 11:48

## 2022-01-01 RX ADMIN — CEFEPIME 100 MILLIGRAM(S): 1 INJECTION, POWDER, FOR SOLUTION INTRAMUSCULAR; INTRAVENOUS at 17:31

## 2022-01-01 RX ADMIN — Medication 3 MILLILITER(S): at 06:20

## 2022-01-01 RX ADMIN — Medication 12.5 MILLIGRAM(S): at 05:33

## 2022-01-01 RX ADMIN — PANTOPRAZOLE SODIUM 40 MILLIGRAM(S): 20 TABLET, DELAYED RELEASE ORAL at 06:36

## 2022-01-01 RX ADMIN — Medication 0: at 17:02

## 2022-01-01 RX ADMIN — SODIUM CHLORIDE 4 MILLILITER(S): 9 INJECTION INTRAMUSCULAR; INTRAVENOUS; SUBCUTANEOUS at 05:29

## 2022-01-01 RX ADMIN — BUDESONIDE AND FORMOTEROL FUMARATE DIHYDRATE 2 PUFF(S): 160; 4.5 AEROSOL RESPIRATORY (INHALATION) at 05:06

## 2022-01-01 RX ADMIN — INSULIN GLARGINE 5 UNIT(S): 100 INJECTION, SOLUTION SUBCUTANEOUS at 08:02

## 2022-01-01 RX ADMIN — Medication 100 MILLIGRAM(S): at 22:06

## 2022-01-01 RX ADMIN — PANTOPRAZOLE SODIUM 40 MILLIGRAM(S): 20 TABLET, DELAYED RELEASE ORAL at 05:30

## 2022-01-01 RX ADMIN — Medication 12.5 MILLIGRAM(S): at 17:39

## 2022-01-01 RX ADMIN — Medication 100 MILLIGRAM(S): at 05:49

## 2022-01-01 RX ADMIN — Medication 4 MILLILITER(S): at 22:37

## 2022-01-01 RX ADMIN — SODIUM CHLORIDE 4 MILLILITER(S): 9 INJECTION INTRAMUSCULAR; INTRAVENOUS; SUBCUTANEOUS at 05:10

## 2022-01-01 RX ADMIN — Medication 3 MILLILITER(S): at 00:15

## 2022-01-01 RX ADMIN — CEFEPIME 100 MILLIGRAM(S): 1 INJECTION, POWDER, FOR SOLUTION INTRAMUSCULAR; INTRAVENOUS at 17:07

## 2022-01-01 RX ADMIN — APIXABAN 5 MILLIGRAM(S): 2.5 TABLET, FILM COATED ORAL at 17:31

## 2022-01-01 RX ADMIN — BUDESONIDE AND FORMOTEROL FUMARATE DIHYDRATE 2 PUFF(S): 160; 4.5 AEROSOL RESPIRATORY (INHALATION) at 17:45

## 2022-01-01 RX ADMIN — Medication 4 MILLILITER(S): at 17:39

## 2022-01-01 RX ADMIN — ATORVASTATIN CALCIUM 40 MILLIGRAM(S): 80 TABLET, FILM COATED ORAL at 23:40

## 2022-01-01 RX ADMIN — HEPARIN SODIUM 1350 UNIT(S)/HR: 5000 INJECTION INTRAVENOUS; SUBCUTANEOUS at 20:44

## 2022-01-01 RX ADMIN — Medication 3 MILLILITER(S): at 17:31

## 2022-01-01 RX ADMIN — HEPARIN SODIUM 1100 UNIT(S)/HR: 5000 INJECTION INTRAVENOUS; SUBCUTANEOUS at 08:03

## 2022-01-01 RX ADMIN — Medication 100 MILLIGRAM(S): at 05:08

## 2022-01-01 RX ADMIN — TIOTROPIUM BROMIDE 1 CAPSULE(S): 18 CAPSULE ORAL; RESPIRATORY (INHALATION) at 11:34

## 2022-01-01 RX ADMIN — Medication 4 MILLILITER(S): at 13:33

## 2022-01-01 RX ADMIN — PANTOPRAZOLE SODIUM 40 MILLIGRAM(S): 20 TABLET, DELAYED RELEASE ORAL at 05:08

## 2022-01-01 RX ADMIN — CEFEPIME 100 MILLIGRAM(S): 1 INJECTION, POWDER, FOR SOLUTION INTRAMUSCULAR; INTRAVENOUS at 05:13

## 2022-01-01 RX ADMIN — Medication 100 MILLIGRAM(S): at 13:33

## 2022-01-01 RX ADMIN — SODIUM CHLORIDE 4 MILLILITER(S): 9 INJECTION INTRAMUSCULAR; INTRAVENOUS; SUBCUTANEOUS at 17:08

## 2022-01-01 RX ADMIN — Medication 3 MILLILITER(S): at 11:30

## 2022-01-01 RX ADMIN — SODIUM CHLORIDE 4 MILLILITER(S): 9 INJECTION INTRAMUSCULAR; INTRAVENOUS; SUBCUTANEOUS at 05:49

## 2022-01-01 RX ADMIN — Medication 4 MILLILITER(S): at 05:38

## 2022-01-01 RX ADMIN — Medication 2: at 02:29

## 2022-01-01 RX ADMIN — SENNA PLUS 2 TABLET(S): 8.6 TABLET ORAL at 22:06

## 2022-01-01 RX ADMIN — Medication 3: at 17:22

## 2022-01-01 RX ADMIN — CEFEPIME 100 MILLIGRAM(S): 1 INJECTION, POWDER, FOR SOLUTION INTRAMUSCULAR; INTRAVENOUS at 06:22

## 2022-01-01 RX ADMIN — Medication 100 MILLIGRAM(S): at 21:38

## 2022-01-01 RX ADMIN — Medication 4 MILLILITER(S): at 21:54

## 2022-01-01 RX ADMIN — APIXABAN 5 MILLIGRAM(S): 2.5 TABLET, FILM COATED ORAL at 17:45

## 2022-01-01 RX ADMIN — Medication 100 MILLIGRAM(S): at 22:36

## 2022-01-01 RX ADMIN — PANTOPRAZOLE SODIUM 40 MILLIGRAM(S): 20 TABLET, DELAYED RELEASE ORAL at 17:12

## 2022-01-01 RX ADMIN — Medication 4 MILLILITER(S): at 05:29

## 2022-01-01 RX ADMIN — SODIUM CHLORIDE 4 MILLILITER(S): 9 INJECTION INTRAMUSCULAR; INTRAVENOUS; SUBCUTANEOUS at 17:36

## 2022-01-01 RX ADMIN — SODIUM CHLORIDE 1000 MILLILITER(S): 9 INJECTION INTRAMUSCULAR; INTRAVENOUS; SUBCUTANEOUS at 17:25

## 2022-01-01 RX ADMIN — CHLORHEXIDINE GLUCONATE 1 APPLICATION(S): 213 SOLUTION TOPICAL at 12:17

## 2022-01-01 RX ADMIN — HEPARIN SODIUM 1100 UNIT(S)/HR: 5000 INJECTION INTRAVENOUS; SUBCUTANEOUS at 04:08

## 2022-01-01 RX ADMIN — Medication 3 MILLILITER(S): at 17:45

## 2022-01-01 RX ADMIN — PANTOPRAZOLE SODIUM 40 MILLIGRAM(S): 20 TABLET, DELAYED RELEASE ORAL at 06:22

## 2022-01-01 RX ADMIN — Medication 100 MILLIGRAM(S): at 13:19

## 2022-01-01 RX ADMIN — IRON SUCROSE 200 MILLIGRAM(S): 20 INJECTION, SOLUTION INTRAVENOUS at 17:31

## 2022-01-01 RX ADMIN — PANTOPRAZOLE SODIUM 40 MILLIGRAM(S): 20 TABLET, DELAYED RELEASE ORAL at 17:41

## 2022-01-01 RX ADMIN — Medication 3: at 12:45

## 2022-01-01 RX ADMIN — INSULIN GLARGINE 2 UNIT(S): 100 INJECTION, SOLUTION SUBCUTANEOUS at 08:09

## 2022-01-01 RX ADMIN — HEPARIN SODIUM 1100 UNIT(S)/HR: 5000 INJECTION INTRAVENOUS; SUBCUTANEOUS at 23:59

## 2022-01-01 RX ADMIN — Medication 2: at 12:36

## 2022-01-01 RX ADMIN — SODIUM CHLORIDE 4 MILLILITER(S): 9 INJECTION INTRAMUSCULAR; INTRAVENOUS; SUBCUTANEOUS at 05:24

## 2022-01-01 RX ADMIN — TIOTROPIUM BROMIDE 1 CAPSULE(S): 18 CAPSULE ORAL; RESPIRATORY (INHALATION) at 13:11

## 2022-01-01 RX ADMIN — CHLORHEXIDINE GLUCONATE 1 APPLICATION(S): 213 SOLUTION TOPICAL at 11:36

## 2022-01-01 RX ADMIN — Medication 3 MILLILITER(S): at 05:37

## 2022-01-01 RX ADMIN — INSULIN GLARGINE 8 UNIT(S): 100 INJECTION, SOLUTION SUBCUTANEOUS at 08:26

## 2022-01-01 RX ADMIN — BUDESONIDE AND FORMOTEROL FUMARATE DIHYDRATE 2 PUFF(S): 160; 4.5 AEROSOL RESPIRATORY (INHALATION) at 17:32

## 2022-01-01 RX ADMIN — TIOTROPIUM BROMIDE 1 CAPSULE(S): 18 CAPSULE ORAL; RESPIRATORY (INHALATION) at 11:30

## 2022-01-01 RX ADMIN — Medication 3 MILLILITER(S): at 12:40

## 2022-01-01 RX ADMIN — Medication 3 MILLILITER(S): at 17:10

## 2022-01-01 RX ADMIN — MAGNESIUM OXIDE 400 MG ORAL TABLET 400 MILLIGRAM(S): 241.3 TABLET ORAL at 20:03

## 2022-01-01 RX ADMIN — PANTOPRAZOLE SODIUM 40 MILLIGRAM(S): 20 TABLET, DELAYED RELEASE ORAL at 05:12

## 2022-01-01 RX ADMIN — BUDESONIDE AND FORMOTEROL FUMARATE DIHYDRATE 2 PUFF(S): 160; 4.5 AEROSOL RESPIRATORY (INHALATION) at 05:11

## 2022-01-01 RX ADMIN — PANTOPRAZOLE SODIUM 40 MILLIGRAM(S): 20 TABLET, DELAYED RELEASE ORAL at 17:10

## 2022-01-01 RX ADMIN — Medication 125 MILLIGRAM(S): at 19:02

## 2022-01-01 RX ADMIN — Medication 3 MILLILITER(S): at 14:27

## 2022-01-01 RX ADMIN — Medication 1: at 13:34

## 2022-01-01 RX ADMIN — CHLORHEXIDINE GLUCONATE 1 APPLICATION(S): 213 SOLUTION TOPICAL at 12:55

## 2022-01-01 RX ADMIN — HEPARIN SODIUM 1200 UNIT(S)/HR: 5000 INJECTION INTRAVENOUS; SUBCUTANEOUS at 22:27

## 2022-01-01 RX ADMIN — Medication 100 MILLIGRAM(S): at 06:19

## 2022-01-01 RX ADMIN — TIOTROPIUM BROMIDE 1 CAPSULE(S): 18 CAPSULE ORAL; RESPIRATORY (INHALATION) at 14:27

## 2022-01-01 RX ADMIN — HEPARIN SODIUM 0 UNIT(S)/HR: 5000 INJECTION INTRAVENOUS; SUBCUTANEOUS at 22:49

## 2022-01-01 RX ADMIN — PANTOPRAZOLE SODIUM 40 MILLIGRAM(S): 20 TABLET, DELAYED RELEASE ORAL at 05:06

## 2022-01-01 RX ADMIN — APIXABAN 5 MILLIGRAM(S): 2.5 TABLET, FILM COATED ORAL at 06:19

## 2022-01-01 RX ADMIN — INSULIN GLARGINE 2 UNIT(S): 100 INJECTION, SOLUTION SUBCUTANEOUS at 13:04

## 2022-01-01 RX ADMIN — IRON SUCROSE 200 MILLIGRAM(S): 20 INJECTION, SOLUTION INTRAVENOUS at 18:13

## 2022-01-01 RX ADMIN — Medication 3 MILLILITER(S): at 19:02

## 2022-01-01 RX ADMIN — BUDESONIDE AND FORMOTEROL FUMARATE DIHYDRATE 2 PUFF(S): 160; 4.5 AEROSOL RESPIRATORY (INHALATION) at 05:51

## 2022-01-01 RX ADMIN — TIOTROPIUM BROMIDE 1 CAPSULE(S): 18 CAPSULE ORAL; RESPIRATORY (INHALATION) at 12:03

## 2022-01-01 RX ADMIN — SODIUM CHLORIDE 4 MILLILITER(S): 9 INJECTION INTRAMUSCULAR; INTRAVENOUS; SUBCUTANEOUS at 06:23

## 2022-01-01 RX ADMIN — Medication 100 MILLIGRAM(S): at 21:21

## 2022-01-01 RX ADMIN — POLYETHYLENE GLYCOL 3350 17 GRAM(S): 17 POWDER, FOR SOLUTION ORAL at 05:50

## 2022-01-01 RX ADMIN — POLYETHYLENE GLYCOL 3350 17 GRAM(S): 17 POWDER, FOR SOLUTION ORAL at 17:14

## 2022-01-01 RX ADMIN — Medication 1: at 17:28

## 2022-01-01 RX ADMIN — CITALOPRAM 20 MILLIGRAM(S): 10 TABLET, FILM COATED ORAL at 11:47

## 2022-01-01 RX ADMIN — Medication 100 MILLIGRAM(S): at 23:40

## 2022-01-01 RX ADMIN — CEFEPIME 100 MILLIGRAM(S): 1 INJECTION, POWDER, FOR SOLUTION INTRAMUSCULAR; INTRAVENOUS at 06:19

## 2022-01-01 RX ADMIN — Medication 4 MILLILITER(S): at 22:28

## 2022-01-01 RX ADMIN — CEFEPIME 100 MILLIGRAM(S): 1 INJECTION, POWDER, FOR SOLUTION INTRAMUSCULAR; INTRAVENOUS at 17:19

## 2022-01-01 RX ADMIN — Medication 3: at 12:16

## 2022-01-01 RX ADMIN — SODIUM CHLORIDE 75 MILLILITER(S): 9 INJECTION INTRAMUSCULAR; INTRAVENOUS; SUBCUTANEOUS at 10:20

## 2022-01-01 RX ADMIN — Medication 3: at 08:09

## 2022-01-01 RX ADMIN — BUDESONIDE AND FORMOTEROL FUMARATE DIHYDRATE 2 PUFF(S): 160; 4.5 AEROSOL RESPIRATORY (INHALATION) at 22:07

## 2022-01-01 RX ADMIN — CYANOCOBALAMIN 0 MCG/ML: 1000 INJECTION INTRAMUSCULAR; SUBCUTANEOUS at 00:00

## 2022-01-01 RX ADMIN — Medication 3 MILLILITER(S): at 05:34

## 2022-01-01 RX ADMIN — Medication 3 MILLILITER(S): at 03:55

## 2022-01-01 RX ADMIN — Medication 2: at 08:25

## 2022-01-01 RX ADMIN — Medication 1200 MILLIGRAM(S): at 17:21

## 2022-01-01 RX ADMIN — Medication 1200 MILLIGRAM(S): at 05:50

## 2022-01-01 RX ADMIN — PANTOPRAZOLE SODIUM 40 MILLIGRAM(S): 20 TABLET, DELAYED RELEASE ORAL at 17:26

## 2022-01-01 RX ADMIN — Medication 3 MILLILITER(S): at 12:05

## 2022-01-01 RX ADMIN — Medication 1200 MILLIGRAM(S): at 17:39

## 2022-01-01 RX ADMIN — CEFEPIME 100 MILLIGRAM(S): 1 INJECTION, POWDER, FOR SOLUTION INTRAMUSCULAR; INTRAVENOUS at 17:21

## 2022-01-01 RX ADMIN — Medication 4 MILLILITER(S): at 22:06

## 2022-01-01 RX ADMIN — TIOTROPIUM BROMIDE 1 CAPSULE(S): 18 CAPSULE ORAL; RESPIRATORY (INHALATION) at 12:23

## 2022-01-01 RX ADMIN — Medication 3 MILLILITER(S): at 11:36

## 2022-01-01 RX ADMIN — Medication 2: at 06:43

## 2022-01-01 RX ADMIN — Medication 40 MILLIGRAM(S): at 06:23

## 2022-01-01 RX ADMIN — Medication 4 MILLILITER(S): at 05:06

## 2022-01-01 RX ADMIN — Medication 1200 MILLIGRAM(S): at 17:27

## 2022-01-01 RX ADMIN — Medication 975 MILLIGRAM(S): at 17:26

## 2022-01-01 RX ADMIN — Medication 2: at 08:33

## 2022-01-01 RX ADMIN — Medication 3 MILLILITER(S): at 00:19

## 2022-01-01 RX ADMIN — Medication 3 MILLILITER(S): at 06:22

## 2022-01-01 RX ADMIN — CITALOPRAM 20 MILLIGRAM(S): 10 TABLET, FILM COATED ORAL at 11:34

## 2022-01-01 RX ADMIN — Medication 12.5 MILLIGRAM(S): at 05:26

## 2022-01-01 RX ADMIN — PANTOPRAZOLE SODIUM 40 MILLIGRAM(S): 20 TABLET, DELAYED RELEASE ORAL at 05:50

## 2022-01-01 RX ADMIN — Medication 25 GRAM(S): at 06:19

## 2022-01-01 RX ADMIN — Medication 100 MILLIGRAM(S): at 06:23

## 2022-01-01 RX ADMIN — APIXABAN 5 MILLIGRAM(S): 2.5 TABLET, FILM COATED ORAL at 17:30

## 2022-01-01 RX ADMIN — SODIUM CHLORIDE 4 MILLILITER(S): 9 INJECTION INTRAMUSCULAR; INTRAVENOUS; SUBCUTANEOUS at 06:41

## 2022-01-01 RX ADMIN — Medication 2: at 12:37

## 2022-01-01 RX ADMIN — Medication 3: at 17:10

## 2022-01-01 RX ADMIN — SODIUM CHLORIDE 4 MILLILITER(S): 9 INJECTION INTRAMUSCULAR; INTRAVENOUS; SUBCUTANEOUS at 05:06

## 2022-01-01 RX ADMIN — Medication 40 MILLIEQUIVALENT(S): at 17:46

## 2022-01-01 RX ADMIN — HEPARIN SODIUM 1100 UNIT(S)/HR: 5000 INJECTION INTRAVENOUS; SUBCUTANEOUS at 08:06

## 2022-01-01 RX ADMIN — Medication 12.5 MILLIGRAM(S): at 05:49

## 2022-01-01 RX ADMIN — APIXABAN 5 MILLIGRAM(S): 2.5 TABLET, FILM COATED ORAL at 17:27

## 2022-01-01 RX ADMIN — Medication 4 MILLILITER(S): at 05:49

## 2022-01-01 RX ADMIN — Medication 4 MILLILITER(S): at 14:28

## 2022-01-01 RX ADMIN — CITALOPRAM 20 MILLIGRAM(S): 10 TABLET, FILM COATED ORAL at 12:40

## 2022-01-01 RX ADMIN — CEFEPIME 100 MILLIGRAM(S): 1 INJECTION, POWDER, FOR SOLUTION INTRAMUSCULAR; INTRAVENOUS at 05:18

## 2022-01-01 RX ADMIN — BUDESONIDE AND FORMOTEROL FUMARATE DIHYDRATE 2 PUFF(S): 160; 4.5 AEROSOL RESPIRATORY (INHALATION) at 05:38

## 2022-01-01 RX ADMIN — Medication 1: at 08:33

## 2022-01-01 RX ADMIN — Medication 100 MILLIGRAM(S): at 14:23

## 2022-01-01 RX ADMIN — Medication 2: at 11:57

## 2022-01-01 RX ADMIN — CEFEPIME 100 MILLIGRAM(S): 1 INJECTION, POWDER, FOR SOLUTION INTRAMUSCULAR; INTRAVENOUS at 20:02

## 2022-01-01 RX ADMIN — BUDESONIDE AND FORMOTEROL FUMARATE DIHYDRATE 2 PUFF(S): 160; 4.5 AEROSOL RESPIRATORY (INHALATION) at 17:12

## 2022-01-01 RX ADMIN — INSULIN GLARGINE 10 UNIT(S): 100 INJECTION, SOLUTION SUBCUTANEOUS at 08:48

## 2022-01-01 RX ADMIN — CEFEPIME 100 MILLIGRAM(S): 1 INJECTION, POWDER, FOR SOLUTION INTRAMUSCULAR; INTRAVENOUS at 17:46

## 2022-01-01 RX ADMIN — Medication 100 MILLIGRAM(S): at 14:28

## 2022-01-01 RX ADMIN — Medication 1200 MILLIGRAM(S): at 05:06

## 2022-01-01 RX ADMIN — Medication 12.5 MILLIGRAM(S): at 17:10

## 2022-01-01 RX ADMIN — Medication 100 MILLIGRAM(S): at 05:25

## 2022-01-01 RX ADMIN — ATORVASTATIN CALCIUM 40 MILLIGRAM(S): 80 TABLET, FILM COATED ORAL at 22:05

## 2022-01-01 RX ADMIN — HEPARIN SODIUM 1200 UNIT(S)/HR: 5000 INJECTION INTRAVENOUS; SUBCUTANEOUS at 13:56

## 2022-01-01 RX ADMIN — CITALOPRAM 20 MILLIGRAM(S): 10 TABLET, FILM COATED ORAL at 13:09

## 2022-01-01 RX ADMIN — CEFEPIME 100 MILLIGRAM(S): 1 INJECTION, POWDER, FOR SOLUTION INTRAMUSCULAR; INTRAVENOUS at 05:02

## 2022-01-01 RX ADMIN — PANTOPRAZOLE SODIUM 40 MILLIGRAM(S): 20 TABLET, DELAYED RELEASE ORAL at 17:07

## 2022-01-01 RX ADMIN — Medication 1200 MILLIGRAM(S): at 17:45

## 2022-01-01 RX ADMIN — CITALOPRAM 20 MILLIGRAM(S): 10 TABLET, FILM COATED ORAL at 12:16

## 2022-01-01 RX ADMIN — CITALOPRAM 20 MILLIGRAM(S): 10 TABLET, FILM COATED ORAL at 12:23

## 2022-01-01 RX ADMIN — Medication 3 MILLILITER(S): at 17:25

## 2022-01-01 RX ADMIN — BUDESONIDE AND FORMOTEROL FUMARATE DIHYDRATE 2 PUFF(S): 160; 4.5 AEROSOL RESPIRATORY (INHALATION) at 17:27

## 2022-01-01 RX ADMIN — Medication 250 MILLIGRAM(S): at 17:35

## 2022-01-01 RX ADMIN — Medication 1200 MILLIGRAM(S): at 17:30

## 2022-01-01 RX ADMIN — SODIUM CHLORIDE 4 MILLILITER(S): 9 INJECTION INTRAMUSCULAR; INTRAVENOUS; SUBCUTANEOUS at 17:41

## 2022-01-01 RX ADMIN — HEPARIN SODIUM 950 UNIT(S)/HR: 5000 INJECTION INTRAVENOUS; SUBCUTANEOUS at 07:28

## 2022-01-01 RX ADMIN — POLYETHYLENE GLYCOL 3350 17 GRAM(S): 17 POWDER, FOR SOLUTION ORAL at 05:27

## 2022-01-01 RX ADMIN — Medication 3 MILLILITER(S): at 17:08

## 2022-01-01 RX ADMIN — Medication 3 MILLILITER(S): at 05:24

## 2022-01-01 RX ADMIN — Medication 4 MILLILITER(S): at 13:16

## 2022-01-01 RX ADMIN — TIOTROPIUM BROMIDE 1 CAPSULE(S): 18 CAPSULE ORAL; RESPIRATORY (INHALATION) at 11:46

## 2022-01-01 RX ADMIN — Medication 1200 MILLIGRAM(S): at 05:26

## 2022-01-01 RX ADMIN — Medication 100 MILLIGRAM(S): at 21:53

## 2022-01-01 RX ADMIN — Medication 4 MILLILITER(S): at 15:56

## 2022-01-01 RX ADMIN — CHLORHEXIDINE GLUCONATE 1 APPLICATION(S): 213 SOLUTION TOPICAL at 13:33

## 2022-01-01 RX ADMIN — Medication 12.5 MILLIGRAM(S): at 17:08

## 2022-01-01 RX ADMIN — Medication 3: at 13:03

## 2022-01-01 RX ADMIN — BUDESONIDE AND FORMOTEROL FUMARATE DIHYDRATE 2 PUFF(S): 160; 4.5 AEROSOL RESPIRATORY (INHALATION) at 17:41

## 2022-01-01 RX ADMIN — Medication 2: at 17:44

## 2022-01-01 RX ADMIN — Medication 3 MILLILITER(S): at 05:22

## 2022-01-01 RX ADMIN — HEPARIN SODIUM 1100 UNIT(S)/HR: 5000 INJECTION INTRAVENOUS; SUBCUTANEOUS at 15:40

## 2022-01-01 RX ADMIN — APIXABAN 5 MILLIGRAM(S): 2.5 TABLET, FILM COATED ORAL at 05:50

## 2022-01-01 RX ADMIN — Medication 100 MILLIGRAM(S): at 13:15

## 2022-01-01 RX ADMIN — Medication 100 MILLIGRAM(S): at 14:30

## 2022-01-01 RX ADMIN — Medication 4 MILLILITER(S): at 21:20

## 2022-01-01 RX ADMIN — Medication 1200 MILLIGRAM(S): at 17:08

## 2022-01-01 RX ADMIN — Medication 100 MILLIGRAM(S): at 22:24

## 2022-01-01 RX ADMIN — SODIUM CHLORIDE 4 MILLILITER(S): 9 INJECTION INTRAMUSCULAR; INTRAVENOUS; SUBCUTANEOUS at 17:46

## 2022-01-01 RX ADMIN — Medication 3 MILLILITER(S): at 23:36

## 2022-01-01 RX ADMIN — CEFEPIME 100 MILLIGRAM(S): 1 INJECTION, POWDER, FOR SOLUTION INTRAMUSCULAR; INTRAVENOUS at 13:14

## 2022-01-01 RX ADMIN — Medication 12.5 MILLIGRAM(S): at 17:26

## 2022-01-01 RX ADMIN — APIXABAN 5 MILLIGRAM(S): 2.5 TABLET, FILM COATED ORAL at 05:36

## 2022-01-01 RX ADMIN — Medication 12.5 MILLIGRAM(S): at 17:30

## 2022-01-01 RX ADMIN — APIXABAN 5 MILLIGRAM(S): 2.5 TABLET, FILM COATED ORAL at 17:39

## 2022-01-01 RX ADMIN — Medication 1: at 18:07

## 2022-01-01 RX ADMIN — APIXABAN 5 MILLIGRAM(S): 2.5 TABLET, FILM COATED ORAL at 05:07

## 2022-01-01 RX ADMIN — IRON SUCROSE 200 MILLIGRAM(S): 20 INJECTION, SOLUTION INTRAVENOUS at 18:00

## 2022-01-01 RX ADMIN — POLYETHYLENE GLYCOL 3350 17 GRAM(S): 17 POWDER, FOR SOLUTION ORAL at 17:41

## 2022-01-01 RX ADMIN — Medication 100 MILLIGRAM(S): at 14:00

## 2022-01-01 RX ADMIN — CITALOPRAM 20 MILLIGRAM(S): 10 TABLET, FILM COATED ORAL at 12:01

## 2022-01-01 RX ADMIN — Medication 3: at 08:02

## 2022-01-01 RX ADMIN — HEPARIN SODIUM 950 UNIT(S)/HR: 5000 INJECTION INTRAVENOUS; SUBCUTANEOUS at 06:21

## 2022-01-01 RX ADMIN — SODIUM CHLORIDE 4 MILLILITER(S): 9 INJECTION INTRAMUSCULAR; INTRAVENOUS; SUBCUTANEOUS at 06:09

## 2022-01-01 RX ADMIN — Medication 3 MILLILITER(S): at 23:41

## 2022-01-01 RX ADMIN — CEFEPIME 100 MILLIGRAM(S): 1 INJECTION, POWDER, FOR SOLUTION INTRAMUSCULAR; INTRAVENOUS at 05:25

## 2022-01-01 RX ADMIN — Medication 100 MILLIGRAM(S): at 05:24

## 2022-01-01 RX ADMIN — BUDESONIDE AND FORMOTEROL FUMARATE DIHYDRATE 2 PUFF(S): 160; 4.5 AEROSOL RESPIRATORY (INHALATION) at 05:24

## 2022-01-01 RX ADMIN — Medication 1: at 17:00

## 2022-01-01 RX ADMIN — Medication 975 MILLIGRAM(S): at 17:56

## 2022-01-01 RX ADMIN — BUDESONIDE AND FORMOTEROL FUMARATE DIHYDRATE 2 PUFF(S): 160; 4.5 AEROSOL RESPIRATORY (INHALATION) at 05:29

## 2022-01-01 RX ADMIN — Medication 50 MILLILITER(S): at 20:03

## 2022-01-01 RX ADMIN — INSULIN GLARGINE 12 UNIT(S): 100 INJECTION, SOLUTION SUBCUTANEOUS at 08:33

## 2022-01-01 RX ADMIN — Medication 12.5 MILLIGRAM(S): at 05:23

## 2022-01-01 RX ADMIN — Medication 12.5 MILLIGRAM(S): at 05:09

## 2022-01-01 RX ADMIN — HEPARIN SODIUM 1350 UNIT(S)/HR: 5000 INJECTION INTRAVENOUS; SUBCUTANEOUS at 08:08

## 2022-01-01 RX ADMIN — PANTOPRAZOLE SODIUM 80 MILLIGRAM(S): 20 TABLET, DELAYED RELEASE ORAL at 17:26

## 2022-01-01 RX ADMIN — ATORVASTATIN CALCIUM 40 MILLIGRAM(S): 80 TABLET, FILM COATED ORAL at 22:06

## 2022-01-01 RX ADMIN — Medication 1200 MILLIGRAM(S): at 05:12

## 2022-01-01 RX ADMIN — Medication 12.5 MILLIGRAM(S): at 05:12

## 2022-01-01 RX ADMIN — ATORVASTATIN CALCIUM 40 MILLIGRAM(S): 80 TABLET, FILM COATED ORAL at 21:20

## 2022-01-01 RX ADMIN — PANTOPRAZOLE SODIUM 40 MILLIGRAM(S): 20 TABLET, DELAYED RELEASE ORAL at 05:24

## 2022-01-01 RX ADMIN — Medication 3 MILLILITER(S): at 17:41

## 2022-01-01 RX ADMIN — Medication 100 MILLIGRAM(S): at 05:37

## 2022-01-01 RX ADMIN — CHLORHEXIDINE GLUCONATE 1 APPLICATION(S): 213 SOLUTION TOPICAL at 12:34

## 2022-01-01 RX ADMIN — HEPARIN SODIUM 1200 UNIT(S)/HR: 5000 INJECTION INTRAVENOUS; SUBCUTANEOUS at 05:12

## 2022-01-01 RX ADMIN — SODIUM CHLORIDE 4 MILLILITER(S): 9 INJECTION INTRAMUSCULAR; INTRAVENOUS; SUBCUTANEOUS at 17:26

## 2022-01-01 RX ADMIN — BUDESONIDE AND FORMOTEROL FUMARATE DIHYDRATE 2 PUFF(S): 160; 4.5 AEROSOL RESPIRATORY (INHALATION) at 17:37

## 2022-01-01 RX ADMIN — CHLORHEXIDINE GLUCONATE 1 APPLICATION(S): 213 SOLUTION TOPICAL at 11:22

## 2022-01-01 RX ADMIN — Medication 3 MILLILITER(S): at 12:23

## 2022-01-01 RX ADMIN — Medication 12.5 MILLIGRAM(S): at 17:45

## 2022-01-01 RX ADMIN — CEFEPIME 100 MILLIGRAM(S): 1 INJECTION, POWDER, FOR SOLUTION INTRAMUSCULAR; INTRAVENOUS at 00:19

## 2022-01-01 RX ADMIN — Medication 3 MILLILITER(S): at 13:09

## 2022-01-01 RX ADMIN — PANTOPRAZOLE SODIUM 40 MILLIGRAM(S): 20 TABLET, DELAYED RELEASE ORAL at 16:59

## 2022-01-01 RX ADMIN — Medication 3 MILLILITER(S): at 05:10

## 2022-01-01 RX ADMIN — SENNA PLUS 2 TABLET(S): 8.6 TABLET ORAL at 21:54

## 2022-01-01 RX ADMIN — Medication 1200 MILLIGRAM(S): at 05:38

## 2022-01-01 RX ADMIN — Medication 3 MILLILITER(S): at 23:57

## 2022-01-01 RX ADMIN — CEFEPIME 100 MILLIGRAM(S): 1 INJECTION, POWDER, FOR SOLUTION INTRAMUSCULAR; INTRAVENOUS at 06:08

## 2022-01-01 RX ADMIN — Medication 100 MILLIGRAM(S): at 05:12

## 2022-01-01 RX ADMIN — BUDESONIDE AND FORMOTEROL FUMARATE DIHYDRATE 2 PUFF(S): 160; 4.5 AEROSOL RESPIRATORY (INHALATION) at 06:25

## 2022-01-01 RX ADMIN — HEPARIN SODIUM 1350 UNIT(S)/HR: 5000 INJECTION INTRAVENOUS; SUBCUTANEOUS at 15:35

## 2022-01-01 RX ADMIN — BUDESONIDE AND FORMOTEROL FUMARATE DIHYDRATE 2 PUFF(S): 160; 4.5 AEROSOL RESPIRATORY (INHALATION) at 17:08

## 2022-01-01 RX ADMIN — Medication 3 MILLILITER(S): at 05:49

## 2022-01-01 RX ADMIN — Medication 4 MILLILITER(S): at 21:53

## 2022-01-01 RX ADMIN — Medication 4 MILLILITER(S): at 06:22

## 2022-01-01 RX ADMIN — Medication 1200 MILLIGRAM(S): at 05:04

## 2022-01-01 RX ADMIN — Medication 3 MILLILITER(S): at 11:46

## 2022-01-01 RX ADMIN — BUDESONIDE AND FORMOTEROL FUMARATE DIHYDRATE 2 PUFF(S): 160; 4.5 AEROSOL RESPIRATORY (INHALATION) at 06:20

## 2022-01-01 RX ADMIN — Medication 3 MILLILITER(S): at 17:37

## 2022-01-01 RX ADMIN — Medication 100 MILLIGRAM(S): at 05:04

## 2022-01-01 RX ADMIN — Medication 12.5 MILLIGRAM(S): at 16:58

## 2022-01-01 RX ADMIN — Medication 100 MILLIGRAM(S): at 13:09

## 2022-01-01 RX ADMIN — Medication 12.5 MILLIGRAM(S): at 06:23

## 2022-01-01 RX ADMIN — HEPARIN SODIUM 1350 UNIT(S)/HR: 5000 INJECTION INTRAVENOUS; SUBCUTANEOUS at 06:46

## 2022-01-01 RX ADMIN — PANTOPRAZOLE SODIUM 40 MILLIGRAM(S): 20 TABLET, DELAYED RELEASE ORAL at 05:34

## 2022-01-01 RX ADMIN — INSULIN GLARGINE 12 UNIT(S): 100 INJECTION, SOLUTION SUBCUTANEOUS at 07:33

## 2022-01-01 RX ADMIN — Medication 3 MILLILITER(S): at 05:06

## 2022-01-01 RX ADMIN — Medication 1: at 13:16

## 2022-01-01 RX ADMIN — BUDESONIDE AND FORMOTEROL FUMARATE DIHYDRATE 2 PUFF(S): 160; 4.5 AEROSOL RESPIRATORY (INHALATION) at 05:13

## 2022-01-01 RX ADMIN — Medication 40 MILLIGRAM(S): at 05:24

## 2022-01-01 RX ADMIN — Medication 4 MILLILITER(S): at 14:07

## 2022-01-01 RX ADMIN — Medication 2: at 08:31

## 2022-01-01 RX ADMIN — CITALOPRAM 20 MILLIGRAM(S): 10 TABLET, FILM COATED ORAL at 17:39

## 2022-01-01 RX ADMIN — ATORVASTATIN CALCIUM 40 MILLIGRAM(S): 80 TABLET, FILM COATED ORAL at 22:35

## 2022-01-01 RX ADMIN — SODIUM CHLORIDE 4 MILLILITER(S): 9 INJECTION INTRAMUSCULAR; INTRAVENOUS; SUBCUTANEOUS at 17:31

## 2022-01-01 RX ADMIN — HEPARIN SODIUM 1100 UNIT(S)/HR: 5000 INJECTION INTRAVENOUS; SUBCUTANEOUS at 23:36

## 2022-01-01 RX ADMIN — Medication 100 MILLIGRAM(S): at 14:27

## 2022-01-01 RX ADMIN — TIOTROPIUM BROMIDE 1 CAPSULE(S): 18 CAPSULE ORAL; RESPIRATORY (INHALATION) at 12:42

## 2022-01-01 RX ADMIN — Medication 100 MILLIGRAM(S): at 13:12

## 2022-01-01 RX ADMIN — SODIUM CHLORIDE 4 MILLILITER(S): 9 INJECTION INTRAMUSCULAR; INTRAVENOUS; SUBCUTANEOUS at 05:12

## 2022-01-01 RX ADMIN — CEFEPIME 100 MILLIGRAM(S): 1 INJECTION, POWDER, FOR SOLUTION INTRAMUSCULAR; INTRAVENOUS at 17:25

## 2022-01-01 RX ADMIN — SODIUM CHLORIDE 4 MILLILITER(S): 9 INJECTION INTRAMUSCULAR; INTRAVENOUS; SUBCUTANEOUS at 17:40

## 2022-01-18 PROBLEM — E55.9 VITAMIN D DEFICIENCY: Status: ACTIVE | Noted: 2021-06-04

## 2022-01-18 NOTE — PROCEDURE
[FreeTextEntry1] : PFT revealed mild restrictive dysfunction, with a FEV1 of 1.55L, which is 57% of predicted, with a normal flow volume loop \par \par FENO was 20; a normal value being less than 25. Fractional exhaled nitric oxide (FENO) is regarded as a simple, noninvasive method for assessing eosinophilic airway inflammation. Produced by a variety of cells within the lung, nitric oxide (NO) concentrations are generally low in healthy individuals. However, high concentrations of NO appear to be involved in nonspecific host defense mechanisms and chronic inflammatory  diseases such as asthma. The American Thoracic Society (ATS) therefore recommended using FENO to aid in the diagnosis and monitoring of eosinophilic airway inflammation and asthma, and for identifying steroid responsive individuals whose chronic respiratory symptoms may be caused by airway inflammation \par \par -Images and procedures reviewed in detail and discussed with patient.  F: tolerating PO, no IVF  E: replete K<4, Mg<2  N: Regular     VTE Prophylaxis: SCDs  C: Full Code  D: F

## 2022-01-18 NOTE — ASSESSMENT
[FreeTextEntry1] : Mr. 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 #1 issue is balance / neuropathy / anxiety - poor sleep\par \par His shortness of breath is multifactorial due to:\par -restrictive dysfunction\par -obesity\par -poor breathing mechanics/poor balance\par -cardiac disease\par -asthma\par \par problem 1: asthma / COPD (active)\par -Add Aerobika - mucus clearance device \par -continue Alvesco 160 2 puffs BID\par -continue to use Breo Ellipta 200 1 inhalation QD\par -continue Incruse Ellipta 1 puff QD \par -continue Ventolin 2 puffs Q6H \par -Asthma is believed to be caused by inherited (genetic) and environmental factor, but its exact cause is unknown. Asthma may be triggered by allergens, lung infections, or irritants in the air. Asthma triggers are different for each person\par -Inhaler technique reviewed as well as oral hygiene techniques reviewed with patient. Avoidance of cold air, extremes of temperature, rescue inhaler should be used before exercise. Order of medication reviewed with patient. Recommended use of a cool mist humidifier in the bedroom. \par \par problem 2: allergic rhinitis (stable)\par -continue to use Astelin 1 sniff each nostril BID \par -continue Atrovent 6% nasal spray 1 inhalation each nostril TID \par -continue Clarinex 5 mg QAM\par -Environmental measures for allergies were encouraged including mattress and pillow cover, air purifier, and environmental controls.\par \par problem 3: GERD\par -continue to use Prilosec 40 mg before breakfast\par -continue Pepcid 40mg QHS \par -Rule of 2s: avoid eating too much, eating too late, eating too spicy, eating two hours before bed.\par - Things to avoid including overeating, spicy foods, tight clothing, eating within two hours of bed, this list is not all inclusive.\par - For treatments of reflux, possible options discussed including diet control, H2 blockers, PPIs, as well as coating motility agents discussed as treatment options. Timing of meals and proximity of last meal to sleep were discussed. If symptoms persist, a formal gastrointestinal evaluation is needed. \par \par problem 4: CHILO\par -being recommended to use Oxy-Aid by [employer], or "chin strap"\par -Sleep apnea is associated with adverse clinical consequences which an affect most organ systems. Cardiovascular disease risk includes arrhythmias, atrial fibrillation, hypertension, coronary artery disease, and stroke. Metabolic disorders include diabetes type 2, non-alcoholic fatty liver disease. Mood disorder especially depression; and cognitive decline especially in the elderly. Associations with chronic reflux/Fajardo’s esophagus some but not all inclusive. \par -Reasons to assess this include arousal consistent with hypopnea; respiratory events most prominent in REM sleep or supine position; therefore sleep staging and body position are important for accurate diagnosis and estimation of AHI. \par \par problem 6: abnormal chest CT\par -follow up chest CT will be followed up - overdue\par \par problem 7: obesity\par -Recommend "Muniq" OTC for weight loss, energy, and blood sugar \par -Weight loss, exercise, and diet control were discussed and are highly encouraged. Treatment options were given such as, aqua therapy, and contacting a nutritionist. Recommended to use the elliptical, stationary bike, less use of treadmill. Obesity is associated with worsening asthma, shortness of breath, and potential for cardiac disease, diabetes, and other underlying medical conditions. \par \par problem 8: bloating\par -recommended probiotic, and was prescribed Align \par \par problem 9: poor breathing mechanics\par -Proper breathing techniques were reviewed with an emphasis of exhalation. Patient instructed to breath in for 1 second and out for four seconds. Patient was encouraged to not talk while walking.\par \par Problem 10: Sciatica\par -Recommended to use recliner \par \par Problem 11: Health Maintenance/COVID19 Precautions:\par - S/p Covid 19 vaccine (Moderna) x3\par -At-home stretching exercises were discussed and demonstrated with the patient. \par - Clean your hands often. Wash your hands often with soap and water for at least 20 seconds, especially after blowing your nose, coughing, or sneezing, or having been in a public place.\par - If soap and water are not available, use a hand  that contains at least 60% alcohol.\par - To the extent possible, avoid touching high-touch surfaces in public places - elevator buttons, door handles, handrails, handshaking with people, etc. Use a tissue or your sleeve to cover your hand or finger if you must touch something.\par - Wash your hands after touching surfaces in public places.\par - Avoid touching your face, nose, eyes, etc.\par - Clean and disinfect your home to remove germs: practice routine cleaning of frequently touched surfaces (for example: tables, doorknobs, light switches, handles, desks, toilets, faucets, sinks & cell phones)\par - Avoid crowds, especially in poorly ventilated spaces. Your risk of exposure to respiratory viruses like COVID-19 may increase in crowded, closed-in settings with little air circulation if\par there are people in the crowd who are sick. All patients are recommended to practice social distancing and stay at least 6 feet away from others.\par - Avoid all non-essential travel including plane trips, and especially avoid embarking on cruise ships.\par -If COVID-19 is spreading in your community, take extra measures to put distance between yourself and other people to further reduce your risk of being exposed to this new virus.\par -Stay home as much as possible.\par - Consider ways of getting food brought to your house through family, social, or commercial networks\par -Be aware that the virus has been known to live in the air up to 3 hours post exposure, cardboard up to 24 hours post exposure, copper up to 4 hours post exposure, steel and plastic up to 2-3 days post exposure. Risk of transmission from these surfaces are affected by many variables.\par \par Immune Support Recommendations:\par -OTC Vitamin C 500mg BID \par -OTC Quercetin 250-500mg BID \par -OTC Zinc 75-100mg per day \par -OTC Melatonin 1or 2mg a night \par -OTC Vitamin D 1-4000mg per day\par -Tonic Water 8oz\par -Recommended to Stay Hydrated (At least a gallon/day)\par \par Asthma and COVID19:\par You need to make sure your asthma is under control. This often requires the use of inhaled corticosteroids (and sometimes oral corticosteroids). Inhaled corticosteroids do not likely reduce your immune system’s ability to fight infections, but oral corticosteroids may. It is important to use the steps above to protect yourself to limit your exposure to any respiratory virus. \par \par problem 12 : health maintenance \par -Recommend Throat Coat Tea and Slippery [place]m\par - recommended Neuro renew for neuropathy\par - recommended Topricin cream \par -recommended to try IB-Jordan\par -s/p influenza vaccination - 2020 \par -s/p strep pneumonia vaccines: Prevnar-13 vaccine, followed by Pneumo vaccine 23 one year following (completed) \par -recommended early intervention for URIs\par -recommended regular osteoporosis evaluations\par -recommended early dermatological evaluations\par -recommended after the age of 50 to the age of 70, colonoscopy every 5 years \par \par F/U in 6 months with full PFTs\par He is encouraged to call with any changes, concerns, or questions.

## 2022-01-18 NOTE — ADDENDUM
[FreeTextEntry1] : Documented by Srini Nazario acting as a scribe for Dr. Renan Delgado on 01/18/2022\par \par All medical record entries made by the scribe were at my, Dr. Renan Delgado's, direction and personally dictated by me on 01/18/2022. I have reviewed the chart and agree that the record accurately reflects my personal performance of the history, physical exam, assessment, and plan. I have also personally directed, reviewed, and agree with the discharge instructions.

## 2022-01-18 NOTE — PHYSICAL EXAM
[No Acute Distress] : no acute distress [Normal Oropharynx] : normal oropharynx [Normal Appearance] : normal appearance [No Neck Mass] : no neck mass [Normal Rate/Rhythm] : normal rate/rhythm [Normal S1, S2] : normal s1, s2 [No Resp Distress] : no resp distress [Clear to Auscultation Bilaterally] : clear to auscultation bilaterally [No Abnormalities] : no abnormalities [Benign] : benign [Normal Gait] : normal gait [No Clubbing] : no clubbing [No Cyanosis] : no cyanosis [No Edema] : no edema [FROM] : FROM [Normal Color/ Pigmentation] : normal color/ pigmentation [No Focal Deficits] : no focal deficits [Oriented x3] : oriented x3 [Normal Affect] : normal affect [III] : Mallampati Class: III [Kyphosis] : kyphosis [TextBox_54] : 2/6 systolic murmur [TextBox_68] : I:E 1:3, clear

## 2022-01-18 NOTE — HISTORY OF PRESENT ILLNESS
[FreeTextEntry1] : Mr. Hays is 81 year old male with a history of abnormal chest CT, acid reflux disease, allergic rhinitis, asthma, obstructive lung disease, CHILO and SOB presenting to the office today for a follow up visit. His chief complaint is \par -he notes feeling okay in general\par -he notes his sleep is sporadic\par -he notes having a lot of anxiety about his health and family\par -he feels he is in worse physical condition than he was a year ago\par -he notes his balance is off\par -he notes he is unable to urinate standing up due to his balance\par -he notes he is tired of deterioration\par -he notes he has been in PT 3 times and started practicing the exercises again 2 weeks ago\par -he notes getting 2 hours of sleep in a row, then he is up for an hour and a half, then he falls back asleep\par -he notes he has been coughing throughout the day\par -he notes the cough is productive of phlegm and the cough clears it but then the phlegm returns\par -he notes meals do not make it worse but it is worse in the afternoon\par -he notes taking Incruse, Breo, and Albuterol\par \par -patient denies any headaches, nausea, vomiting, fever, chills, sweats, chest pain, chest pressure, palpitations, wheezing, fatigue, diarrhea, constipation, dysphagia, myalgias, dizziness, leg swelling, leg pain, itchy eyes, itchy ears, heartburn, reflux or sour taste in the mouth

## 2022-06-07 PROBLEM — M54.50 ACUTE LOW BACK PAIN, UNSPECIFIED BACK PAIN LATERALITY, UNSPECIFIED WHETHER SCIATICA PRESENT: Status: RESOLVED | Noted: 2021-06-03 | Resolved: 2022-01-01

## 2022-06-07 PROBLEM — Z01.812 PRE-PROCEDURAL LABORATORY EXAMINATION: Status: RESOLVED | Noted: 2021-07-01 | Resolved: 2022-01-01

## 2022-06-07 PROBLEM — G62.9 NEUROPATHY: Status: ACTIVE | Noted: 2021-06-04

## 2022-06-07 NOTE — HISTORY OF PRESENT ILLNESS
[FreeTextEntry1] : Comes in for annual physical. [de-identified] : Feeling well.\par Denies covid 19 illness.

## 2022-06-07 NOTE — PHYSICAL EXAM
[No Acute Distress] : no acute distress [Well Nourished] : well nourished [Well Developed] : well developed [Well-Appearing] : well-appearing [Normal Voice/Communication] : normal voice/communication [Normal Sclera/Conjunctiva] : normal sclera/conjunctiva [PERRL] : pupils equal round and reactive to light [EOMI] : extraocular movements intact [Normal Outer Ear/Nose] : the outer ears and nose were normal in appearance [No JVD] : no jugular venous distention [No Lymphadenopathy] : no lymphadenopathy [Supple] : supple [No Respiratory Distress] : no respiratory distress  [No Accessory Muscle Use] : no accessory muscle use [Clear to Auscultation] : lungs were clear to auscultation bilaterally [Normal Rate] : normal rate  [No Edema] : there was no peripheral edema [No Extremity Clubbing/Cyanosis] : no extremity clubbing/cyanosis [Declined Breast Exam] : declined breast exam  [Soft] : abdomen soft [Normal Bowel Sounds] : normal bowel sounds [Declined Rectal Exam] : declined rectal exam [No CVA Tenderness] : no CVA  tenderness [No Spinal Tenderness] : no spinal tenderness [Kyphosis] : kyphosis [No Rash] : no rash [No Focal Deficits] : no focal deficits [Speech Grossly Normal] : speech grossly normal [Normal Affect] : the affect was normal [Alert and Oriented x3] : oriented to person, place, and time [Normal Nasal Mucosa] : the nasal mucosa was normal [de-identified] : Examined in wheelchair as he refuses to move to exam table for exam [de-identified] : No sinus tenderness; wearing full facemask; wearing bilateral hearing aids [de-identified] : No stridor or thyromegaly [de-identified] : R = 16; good air entry with no wheezing [de-identified] : Irregularly irregular = AF [de-identified] : No cords [de-identified] : As per urology

## 2022-06-07 NOTE — ASSESSMENT
[FreeTextEntry1] : See health maintenance assessment and plan outlined below.\par In addition:\par Full labs sent today\par Urine testing declined today\par Podiatry follow-up 2022\par Ortho /pain management f/u as needed\par GI follow-up with Dr. Fournier 2022\par Had colonoscopy 2019\par Urology follow-up 2022\par Continue medications, diet, exercise as outlined\par He declined EKG here today\par Cardiology follow-up 2022\par He does chest imaging and PFTs with pulmonary\par Pulmonary follow-up 2022\par Endocrine follow-up 2022 for stable diabetes\par Vaccines reviewed\par ENT follow-up 2022\par Dermatology follow-up 2022\par Dental follow-up 2022\par Ophthalmology follow-up 2022\par Neurology follow-up 2022 for stable neuropathy ?DM related\par Psych follow-up 2022\par He will return in follow-up for an annual physical and as needed\par He will call if his status worsens or for any medical issues\par All of his questions were answered\par All of the above was discussed in detail with the patient and his daughter\par They verbally confirmed understanding of all of the above and agreement with the above plan

## 2022-06-07 NOTE — HEALTH RISK ASSESSMENT
[Fair] :  ~his/her~ mood as fair [Yes] : Yes [Monthly or less (1 pt)] : Monthly or less (1 point) [1 or 2 (0 pts)] : 1 or 2 (0 points) [Never (0 pts)] : Never (0 points) [No] : In the past 12 months have you used drugs other than those required for medical reasons? No [No falls in past year] : Patient reported no falls in the past year [1] : 2) Feeling down, depressed, or hopeless for several days (1) [No Retinopathy] : No retinopathy [HIV test declined] : HIV test declined [Hepatitis C test declined] : Hepatitis C test declined [None] : None [With Family] : lives with family [# of Members in Household ___] :  household currently consist of [unfilled] member(s) [Retired] : retired [Graduate School] : graduate school [] :  [# Of Children ___] : has [unfilled] children [Feels Safe at Home] : Feels safe at home [Smoke Detector] : smoke detector [Carbon Monoxide Detector] : carbon monoxide detector [Seat Belt] :  uses seat belt [Sunscreen] : uses sunscreen [Former] : Former [20 or more] : 20 or more [PHQ-2 Positive] : PHQ-2 Positive [PHQ-9 Negative - No further assessment needed] : PHQ-9 Negative - No further assessment needed [With Patient/Caregiver] : , with patient/caregiver [Designated Healthcare Proxy] : Designated healthcare proxy [Name: ___] : Health Care Proxy's Name: [unfilled]  [Relationship: ___] : Relationship: [unfilled] [FreeTextEntry1] : left nostril pain [de-identified] : cardiology, pulmonary, ophtho, endocrine, dentist, podiatry, neuro [YearQuit] : 1981 [Audit-CScore] : 1 [de-identified] : CBD [de-identified] : walking [de-identified] : Regular [ANM6Breea] : 2 [EyeExamDate] : 11/21 [Change in mental status noted] : No change in mental status noted [Reports changes in hearing] : Reports no changes in hearing [Reports changes in vision] : Reports no changes in vision [Reports changes in dental health] : Reports no changes in dental health [Guns at Home] : no guns at home [Travel to Developing Areas] : does not  travel to developing areas [TB Exposure] : is not being exposed to tuberculosis [Caregiver Concerns] : does not have caregiver concerns [ColonoscopyDate] : 2019 [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

## 2022-06-10 PROBLEM — R74.8 ELEVATED ALKALINE PHOSPHATASE LEVEL: Status: ACTIVE | Noted: 2022-01-01

## 2022-06-10 PROBLEM — D50.9 IRON DEFICIENCY ANEMIA: Status: ACTIVE | Noted: 2022-01-01

## 2022-06-28 NOTE — H&P ADULT - PROBLEM SELECTOR PLAN 7
Microcytic anemia, Hgb=11 on admission. Per outpatient record, last colonoscopy in 2019. Off aspirin. FOBT + in ED. No reports of melena or hematochezia.   -needs outpt colonoscopy Microcytic anemia, Hgb=11 on admission. Per outpatient record, last colonoscopy in 2019. Off aspirin. FOBT + in ED. No reports of melena or hematochezia.   -send iron panel, replete as needed  -Reticulocyte count  -B12 and folate  -needs outpt colonoscopy

## 2022-06-28 NOTE — H&P ADULT - ASSESSMENT
80 yo M PMH of COPD, DM II, Afib, CAD s/p 3 stents, esophageal cancer s/p esophagectomy, peripheral neuropathy and HTN who presents with 3 days of SOB and dry cough. He met SIRS criteria (hypotensive, febrile, tachypnic and leukocytotic) on presentation and was admitted for sepsis 2/2 to pneumonia in the setting of COPD exacerbation.

## 2022-06-28 NOTE — H&P ADULT - PROBLEM SELECTOR PLAN 1
SIRS (hypotension, febrile and tachypnic), likely source=pneumonia. S/p 1 dose IV vanco in ED and started on cefepime. CT angio showed concerns for aspiration pneumonia.   -c/w cefepime and add flagyl to cover for aspiration pneumonia  -f/u blood cultures  -obtain UA and urine culture  -trend lactate  -urine legionella  -s/p 2 L IVF in the ED, c/w maintenance IVF (75 cc/hr for 24 hours) SIRS (hypotension, febrile and tachypnic), likely source=pneumonia. S/p 1 dose IV vanco in ED and started on cefepime. CT angio showed concerns for aspiration pneumonia.   -c/w cefepime and add flagyl to cover for aspiration pneumonia  -f/u blood cultures  -obtain UA and urine culture  -trend lactate  -send procalciton  -urine legionella, if atypical workup + consider adding azithro  -s/p 2 L IVF in the ED, c/w maintenance IVF (75 cc/hr for 24 hours)

## 2022-06-28 NOTE — H&P ADULT - NSHPLABSRESULTS_GEN_ALL_CORE
CXR: Bibasilar atelectasis and/or pleural effusion.    CT-angiogram:   FINDINGS:    CTA: The contrast bolus is satisfactory. There is no filling defect in   the pulmonary artery or its branches to the segmental level. The heart is   normal in size. No pericardial effusion. There are three-vessel coronary   artery calcifications. The mid ascending aorta measures 3.7 cm.    LUNGS AND AIRWAYS: There is extensive ill-defined nodularity in both   lower lobes and right middle lobes. Layering secretions completely   opacify the bronchus intermedius and right middle and right lower lobe   bronchi. Extensivemucous plugging within segmental and subsegmental   bronchi in both lower lobes and right middle lobe.    PLEURA: No pleural effusion.    MEDIASTINUM AND PEE: No lymphadenopathy. Status post esophagectomy with   gastric pull-through. There is a hiatal hernia.    VISUALIZED UPPER ABDOMEN: Cholecystectomy.    BONES: There is diffuse qualitative osteopenia and multilevel discogenic   disease in the spine. Compression fracture of T11 vertebral body, age   indeterminate although new since 12/18/2018.    IMPRESSION:    1.  No pulmonary thromboembolism to the segmental level    2.  Findings the lower lobes compatible with aspiration. Repeat chest CT   scan is recommended in 6-8 weeks following treatment to ensure resolution    3.  Status post esophagectomy    EKG: Rate=86 NSR with PVCs and inferior infarct of undetermined age. LABS:  cret                        11.0   11.02 )-----------( 329      ( 28 Jun 2022 16:29 )             37.0     06-28    134<L>  |  96  |  19  ----------------------------<  253<H>  5.6<H>   |  23  |  0.79    Ca    10.2      28 Jun 2022 16:29  Phos  3.6     06-28  Mg     1.4     06-28    TPro  7.1  /  Alb  3.5  /  TBili  0.7  /  DBili  x   /  AST  20  /  ALT  23  /  AlkPhos  150<H>  06-28    PT/INR - ( 28 Jun 2022 16:29 )   PT: 19.9 sec;   INR: 1.72 ratio         PTT - ( 28 Jun 2022 16:29 )  PTT:35.1 sec  CXR: Bibasilar atelectasis and/or pleural effusion.    CT-angiogram:   FINDINGS:    CTA: The contrast bolus is satisfactory. There is no filling defect in   the pulmonary artery or its branches to the segmental level. The heart is   normal in size. No pericardial effusion. There are three-vessel coronary   artery calcifications. The mid ascending aorta measures 3.7 cm.    LUNGS AND AIRWAYS: There is extensive ill-defined nodularity in both   lower lobes and right middle lobes. Layering secretions completely   opacify the bronchus intermedius and right middle and right lower lobe   bronchi. Extensivemucous plugging within segmental and subsegmental   bronchi in both lower lobes and right middle lobe.    PLEURA: No pleural effusion.    MEDIASTINUM AND PEE: No lymphadenopathy. Status post esophagectomy with   gastric pull-through. There is a hiatal hernia.    VISUALIZED UPPER ABDOMEN: Cholecystectomy.    BONES: There is diffuse qualitative osteopenia and multilevel discogenic   disease in the spine. Compression fracture of T11 vertebral body, age   indeterminate although new since 12/18/2018.    IMPRESSION:    1.  No pulmonary thromboembolism to the segmental level    2.  Findings the lower lobes compatible with aspiration. Repeat chest CT   scan is recommended in 6-8 weeks following treatment to ensure resolution    3.  Status post esophagectomy    EKG: Rate=86 NSR with PVCs and inferior infarct of undetermined age.

## 2022-06-28 NOTE — H&P ADULT - NSHPSOCIALHISTORY_GEN_ALL_CORE
Patient currently lives at home in [place] with his wife in a [facility]. His wife is a retired nurse. He has a remote smoking history of 1.5 packs a day for 25 years. He endorses alcohol use and states that he drinks one drink every 2 or 3 weeks. Patient does not ambulate at home due to peripheral neuropathy and uses a wheelchair.

## 2022-06-28 NOTE — ED PROVIDER NOTE - NSICDXPASTMEDICALHX_GEN_ALL_CORE_FT
PAST MEDICAL HISTORY:  Carotid Artery Disease     COPD exacerbation     Diabetes mellitus     DVT, lower extremity     Esophageal cancer     Hypertension     Myocardial infarction     Obstructive sleep apnea     Paroxysmal a-fib     Pneumonia

## 2022-06-28 NOTE — H&P ADULT - NSHPPHYSICALEXAM_GEN_ALL_CORE
T(C): 36.3 (06-28-22 @ 20:20), Max: 38.6 (06-28-22 @ 16:35)  HR: 82 (06-28-22 @ 20:20) (82 - 94)  BP: 101/63 (06-28-22 @ 20:20) (79/52 - 110/62)  RR: 22 (06-28-22 @ 20:20) (22 - 30)  SpO2: 95% (06-28-22 @ 20:20) (86% - 100%)    CONSTITUTIONAL: Well groomed, NAD    EYES: PERRLA and symmetric, EOMI, No conjunctival or scleral injection, non-icteric    ENMT: Oral mucosa with moist membranes. No external nasal lesions; nasal mucosa not inflamed; normal dentition; no pharyngeal injection or exudates. Otoscopic exam with normal tympanic membranes; no gross hearing impairment noted.  NECK: Supple, symmetric and without tracheal deviation; thyroid gland not enlarged and without palpable masses    RESPIRATORY: No respiratory distress, no use of accessory muscles; wheezing and rales appreciated in the lower lobes bilaterally, no rhonchi, no dullness or hyperresonance to percussion, no tactile fremitus, no subcutaneous emphysema    CARDIOVASCULAR: RRRR, +S1S2, no murmurs, no rubs, no gallops; no JVD; no peripheral edema  	Vascular: no carotid bruits; no abdominal bruit; carotid pulse palpable, radial pulse palpable, femoral pulse palpable, dorsalis pedis pulse palpable, posterior tibialis pulse palpable    GASTROINTESTINAL: Soft, non tender, non distended, no rebound, no guarding; No palpable masses; no hepatosplenomegaly; no hernia palpated;   	  LYMPHATIC: No cervical LAD or tenderness; no axillary LAD or tenderness; no inguinal LAD or tenderness    MUSCULOSKELETAL: Normal gait and station; no digital clubbing or cyanosis; examination of the (head/neck, spine/ribs/pelvis, RUE, LUE, RLE, LLE) without misalignment, normal range of motion without pain, no spinal tenderness, normal muscle strength/tone    SKIN: No rashes or ulcers noted; no subcutaneous nodules or induration palpable    NEUROLOGIC: CN II-XII intact; normal reflexes in upper and lower extremities, sensation intact in upper and lower extremities b/l to light touch; Babinski down b/l; no Kernig’s sign, no Brudzinski’s sign    PSYCHIATRIC: Appropriate insight/judgment; A+O x 3, mood and affect appropriate, recent/remote memory intact T(C): 36.3 (06-28-22 @ 20:20), Max: 38.6 (06-28-22 @ 16:35)  HR: 82 (06-28-22 @ 20:20) (82 - 94)  BP: 101/63 (06-28-22 @ 20:20) (79/52 - 110/62)  RR: 22 (06-28-22 @ 20:20) (22 - 30)  SpO2: 95% (06-28-22 @ 20:20) (86% - 100%)    CONSTITUTIONAL: Well groomed, NAD    EYES: PERRLA and symmetric, EOMI, No conjunctival or scleral injection, non-icteric    ENMT: Oral mucosa with moist membranes. No external nasal lesions; nasal mucosa not inflamed; normal dentition; no pharyngeal injection or exudates. Otoscopic exam with normal tympanic membranes; no gross hearing impairment noted.  NECK: Supple, symmetric and without tracheal deviation; thyroid gland not enlarged and without palpable masses    RESPIRATORY: No respiratory distress, no use of accessory muscles; wheezing and rales appreciated in the lower lobes bilaterally, decreased breath sounds b/l,  no rhonchi, no dullness or hyperresonance to percussion, no tactile fremitus, no subcutaneous emphysema    CARDIOVASCULAR: RRRR, +S1S2, no murmurs, no rubs, no gallops; no JVD; no peripheral edema  	Vascular: no carotid bruits; no abdominal bruit; carotid pulse palpable, radial pulse palpable, femoral pulse palpable, dorsalis pedis pulse palpable, posterior tibialis pulse palpable    GASTROINTESTINAL: Soft, non tender, non distended, no rebound, no guarding; No palpable masses; no hepatosplenomegaly; no hernia palpated;   	  LYMPHATIC: No cervical LAD or tenderness; no axillary LAD or tenderness; no inguinal LAD or tenderness    MUSCULOSKELETAL: Normal gait and station; no digital clubbing or cyanosis; examination of the (head/neck, spine/ribs/pelvis, RUE, LUE, RLE, LLE) without misalignment, normal range of motion without pain, no spinal tenderness, normal muscle strength/tone    SKIN: No rashes or ulcers noted; no subcutaneous nodules or induration palpable    NEUROLOGIC: CN II-XII intact; normal reflexes in upper and lower extremities, sensation intact in upper and lower extremities b/l to light touch; Babinski down b/l; no Kernig’s sign, no Brudzinski’s sign    PSYCHIATRIC: Appropriate insight/judgment; A+O x 3, mood and affect appropriate, recent/remote memory intact

## 2022-06-28 NOTE — H&P ADULT - HISTORY OF PRESENT ILLNESS
80 yo M PMH of COPD, DM II, Afib, 2 MIs s/p 2 stents, esophageal cancer s/p esophagectomy, peripheral neuropathy and HTN who presents with 3 days of SOB and dry cough. He states that his shortness of breath began 3 days ago while at rest and was associated with a dry cough that was not productive of any sputum. He states that since then his symptoms have worsened and he consulted his outpatient pulmonologist who prescribed him steroids earlier today, however he was unable to take them prior to presentation. Home pulse oximetry today showed SpO2 of mid to low 80s which prompted him to seek transport to the hospital. Upon arrival to the ED, he was found to be hypotensive, febrile, tachypnic. Labs were significant for leukocytosis, elevated lactate and hyperkalemic. CXR showed bibasilar atelectasis with possible pleural effusions bilaterally. CT angio showed no evidence of PE and lower lobe infiltrates compatible with possible aspiration pneumonia. EKG showed no acute ischemic changes. He was given fluids, vanc and cefepime.

## 2022-06-28 NOTE — ED PROVIDER NOTE - CLINICAL SUMMARY MEDICAL DECISION MAKING FREE TEXT BOX
See Attending Note See Wilson MD (PGY-3): 80yo male with pmhx of COPD, DM2, Afib on Eliquis, 2 MIs s/p 2 stents, esophageal cancer s/p esophagectomy, peripheral neuropathy, and HTN who p/w 3 days of SOB and dry cough. Pt is febrile, hypotensive, and hypoxic. Meets sepsis criteria. Concern for PNA, PE, COPD exacerbation, GI bleed. Abx, IVF, Tylenol, blood cultures, labs, cxr, CTA chest, ekg, Protonix, Duoneb, Solu-medrol. Pt will be admitted to Medicine.

## 2022-06-28 NOTE — H&P ADULT - PROBLEM SELECTOR PLAN 10
DVT ppx: c/w eliquis  Diet: regular diet (CC and DASH)  Dispo: pending clinical course, PT recs DVT ppx: c/w eliquis  Diet: NPO pending s/s eval in AM, replete electrolytes as needed  Dispo: pending clinical course, PT recs

## 2022-06-28 NOTE — ED PROVIDER NOTE - ATTENDING CONTRIBUTION TO CARE
pt is a 80 y/o male presents with weakness for a few days, likely septic shock with sepsis order set, fluids ordered, abx.

## 2022-06-28 NOTE — H&P ADULT - PROBLEM SELECTOR PLAN 8
c/w atorvastatin and holding atenolol in setting of sepsis  -aspirin being held outpatient due to new anemia

## 2022-06-28 NOTE — ED PROVIDER NOTE - PHYSICAL EXAMINATION
Gen: NAD. A&Ox4. Non-toxic appearing.  HEENT: Normocephalic and atraumatic. PERRL, EOMI, no nasal discharge, mucous membranes dry, conjunctival pallor, no scleral icterus.  CV: Irregularly irregular rhythm, normal rate. No M/R/G. No significant lower extremity edema. Radial and DP pulses present and symmetrical. Capillary refill less than 2 seconds.  Resp: Increased effort and rate (20s). Course breath sounds with diffuse crackles.  GI: Abdomen soft, non-distended, non tender to palpation. No masses appreciated. Bowel sounds present.  MSK: No open wounds and no bruising. No CVAT bilaterally.  Neuro: Following commands, speaking in full sentences, moving extremities spontaneously  Psych: Appropriate mood, cooperative  Rectal: No melena or BRBPR. No external hemorrhoids. Normal rectal tone.

## 2022-06-28 NOTE — H&P ADULT - PROBLEM SELECTOR PLAN 5
Last A1C from June 7, 2022 =5.7%  -holding home oral hyperglycemics  -f/u A1C  -sliding scale insulin

## 2022-06-28 NOTE — ED PROVIDER NOTE - NS ED ROS FT
GENERAL: No fever or chills  EYES: No change in vision  HEENT: No trouble swallowing or speaking  CARDIAC: No chest pain  PULMONARY: +cough and SOB  GI: No abdominal pain, no nausea or no vomiting, no diarrhea or constipation  : No changes in urination  SKIN: No rashes  NEURO: No headache, no numbness  MSK: No joint pain  Otherwise as HPI or negative.

## 2022-06-28 NOTE — H&P ADULT - NSHPREVIEWOFSYSTEMS_GEN_ALL_CORE
REVIEW OF SYSTEMS:  CONSTITUTIONAL: No fever, chills, night sweats, or fatigue  EYES: No eye pain, visual disturbances, or discharge  ENMT:  No difficulty hearing, tinnitus, vertigo; No sinus or throat pain  NECK: No pain or stiffness  RESPIRATORY: +cough, no wheezing, or hemoptysis; +shortness of breath  CARDIOVASCULAR: No chest pain, palpitations, dizziness, or leg swelling  GASTROINTESTINAL: No abdominal or epigastric pain. No nausea, vomiting, or hematemesis; No diarrhea or constipation. No melena or hematochezia.  GENITOURINARY: No dysuria, frequency, hematuria, or incontinence  NEUROLOGICAL: No headaches, memory loss, loss of strength, numbness, or tremors, peripheral neuropathy bilateral LE  SKIN: +itching rash on the extensor surfaces of both UE, no burning, or lesions   LYMPH NODES: No enlarged glands  ENDOCRINE: No heat or cold intolerance; No hair loss  MUSCULOSKELETAL: No joint pain or swelling; No muscle, back, or extremity pain  PSYCHIATRIC: No depression, anxiety, mood swings, or difficulty sleeping  HEME/LYMPH: No easy bruising, or bleeding gums  ALLERGY AND IMMUNOLOGIC: No hives or eczema

## 2022-06-28 NOTE — H&P ADULT - PROBLEM SELECTOR PLAN 2
Found to be hypoxic to mid 80s at home. Currently on 4L NC and satting 94%. 2/2 to COPD exacerbation 2/2 to pneumonia. PE negative.   -c/w supplemental oxygen prn and wean as tolerated  -goal SpO2=88-94%  -s/p 125 mg solumedrol  -c/w prednisone 40mg for a total of 5 days  -airway clearance with aerobika, HTS neb BID  -duonebs Q6  -c/w home symbicort and spiriva Found to be hypoxic to mid 80s at home. Currently on 4L NC and satting 94%. 2/2 to COPD exacerbation 2/2 to pneumonia. PE negative.   -c/w supplemental oxygen prn and wean as tolerated  -goal SpO2=88-94%  -s/p 125 mg solumedrol  -c/w prednisone 40mg for a total of 5 days  -airway clearance with aerobika, HTS neb BID  -duonebs Q6  -c/w home symbicort and spiriva  -NPO pending s/s eval  -Aspiration precautions

## 2022-06-28 NOTE — ED PROVIDER NOTE - NSICDXPASTSURGICALHX_GEN_ALL_CORE_FT
PAST SURGICAL HISTORY:  Elbow joint symptom infection in bursa    Esophageal cancer surgical resection    History of cataract surgery right eye    History of cholecystectomy

## 2022-06-28 NOTE — ED ADULT NURSE NOTE - NSIMPLEMENTINTERV_GEN_ALL_ED
Implemented All Fall with Harm Risk Interventions:  [place] to call system. Call bell, personal items and telephone within reach. Instruct patient to call for assistance. Room bathroom lighting operational. Non-slip footwear when patient is off stretcher. Physically safe environment: no spills, clutter or unnecessary equipment. Stretcher in lowest position, wheels locked, appropriate side rails in place. Provide visual cue, wrist band, yellow gown, etc. Monitor gait and stability. Monitor for mental status changes and reorient to person, place, and time. Review medications for side effects contributing to fall risk. Reinforce activity limits and safety measures with patient and family. Provide visual clues: red socks.

## 2022-06-28 NOTE — ED ADULT NURSE NOTE - OBJECTIVE STATEMENT
81y male arrived to ED complaining of SOB. PMHx HTN, PNA, esophogeal CA, CHILO, DM, afib, MI, CAD. 81y male arrived to ED complaining of SOB. PMHx HTN, PNA, esophogeal CA, CHILO, DM, afib, MI, CAD. Patient accompanied by daughter who expresses concern for weakness and SOB at home. On arrival patient found to be hypoxic low 80s on room air w/ improvement to 100% on nasal canula, tachypneic, febrile 101.4 and hypotensive. Patient denies CP, n/v/d, abd pain, or changes in urination. Patient mostly bedbound. A&Ox4.

## 2022-06-28 NOTE — ED PROVIDER NOTE - NSICDXFAMILYHX_GEN_ALL_CORE_FT
FAMILY HISTORY:  Father  Still living? Unknown  FH: cardiovascular disease, Age at diagnosis: Age Unknown  FH: diabetes mellitus, Age at diagnosis: Age Unknown    Mother  Still living? Unknown  FH: breast cancer, Age at diagnosis: Age Unknown  FH: diabetes mellitus, Age at diagnosis: Age Unknown

## 2022-06-28 NOTE — ED PROVIDER NOTE - OBJECTIVE STATEMENT
82yo male with pmhx of COPD, DM2, Afib on Eliquis, 2 MIs s/p 2 stents, esophageal cancer s/p esophagectomy, peripheral neuropathy, and HTN who p/w 3 days of SOB and dry cough. He states that his shortness of breath began 3 days ago while at rest and was associated with a dry cough that was not productive of any sputum. Since then, his symptoms have worsened and he consulted his outpatient pulmonologist who prescribed him steroids earlier today, however he was unable to take them prior to presentation. Per pt's daughter, home pulse oximetry readings today were in the mid to low 80s which prompted him to seek transport to [facility]. Denies CP, abd pain, n/v/d, urinary complaints, fever. Says he feels generally weak. Says his SOB feels similar to past COPD exacerbations. Allergic to PCN and sulfa drugs. 80yo male with pmhx of COPD, DM2, Afib on Eliquis, 2 MIs s/p 2 stents, esophageal cancer s/p esophagectomy, peripheral neuropathy, and HTN who p/w 3 days of SOB and dry cough. He states that his shortness of breath began 3 days ago while at rest and was associated with a dry cough that was not productive of any sputum. Since then, his symptoms have worsened and he consulted his outpatient pulmonologist who prescribed him steroids earlier today, however he was unable to take them prior to presentation. Per pt's daughter, home pulse oximetry readings today were in the mid to low 80s which prompted him to seek transport to [facility]. Denies CP, abd pain, n/v/d, urinary complaints, fever. Says he feels generally weak. Says his SOB feels similar to past COPD exacerbations. Previous hx of DVT. Allergic to PCN and sulfa drugs.

## 2022-06-28 NOTE — H&P ADULT - ATTENDING COMMENTS
82 yo M PMH of COPD, DM II, Afib, CAD s/p 3 stents, esophageal cancer s/p esophagectomy, peripheral neuropathy and HTN  presents with 3 days of SOB and dry cough likely due to aspiration pna +/- COPD exacerbation.    obtain/follow up blood and sputum cultures, atypical work up, mrsa screen, procal  continue broad spec abx therapy + prednisone as above  aspiration precautions, elevate head end of bed, SLP eval in AM and NPO with IVF + lytes until then  bronchodilators + oxygen supplementation as needed  aggressive pulm hygiene with chest physiotherapy/vest therapy/chest percussion/incentive spirometry  given NPO and on corticosteroids, monitor BG closely, and adjust insulin correctional scale accordingly

## 2022-06-29 NOTE — SWALLOW BEDSIDE ASSESSMENT ADULT - COMMENTS
IMAGING:  CXR: 6/28/22  IMPRESSION:  Bibasilar atelectasis and/or pleural effusion.    CT ANGIO CHEST: 6/28/22  IMPRESSION:  1.  No pulmonary thromboembolism to the segmental level.   2.  Findings the lower lobes compatible with aspiration.   3.  Status post esophagectomy.     Pt is unknown to this service.

## 2022-06-29 NOTE — CONSULT NOTE ADULT - SUBJECTIVE AND OBJECTIVE BOX
[facility] - Division of Pulmonary, Critical Care and Sleep Medicine   Please call [phone] between 8am-pm weekdays, [phone] after hours and weekends      CHIEF COMPLAINT:    HPI: 80 yo M former smoker (20 py, quit ) with a h/o COPD, DM, Afib, CAD s/p PCI and stentsx2, esophageal cancer s/p esophagectomy, peripheral neuropathy and HTN who presents with progressive SOB and nonproductive dry cough x3 days. Spoke with Dr. Delgado's office yesterday, thought to be having a mild exacerbation of his COPD and started on Prednisone taper, however presented to [facility] ED last night as his SpO2 was in the mid 80s.   In the ED, hypotensive, febrile, tachypneic.   Labs significant for leukocytosis, elevated lactate and hyperkalemic. CXR showed bibasilar atelectasis with possible pleural effusions bilaterally.   CTPA negative for PE, sig findings of extensive mucous plugging within segmental and subsegmental bronchi in both lower lobes and right middle lobe;  bilateral lower lobe infiltrates, concerning for aspiration pneumonia. Was started on Sepsis treatment with fluids, Vancomycin and Cefepime.   COVID PCR negative, RVP positive for Entero/Rhinovirus  Home meds: Breo, Incruse and Ventolin    PAST MEDICAL & SURGICAL HISTORY:  Carotid Artery Disease  Myocardial infarction  Paroxysmal a-fib  Diabetes mellitus      Obstructive sleep apnea      Esophageal cancer      Pneumonia      Hypertension      COPD exacerbation      DVT, lower extremity      Esophageal cancer  surgical resection      History of cholecystectomy      History of cataract surgery  right eye      Elbow joint symptom  infection in bursa          FAMILY HISTORY:  FH: breast cancer (Mother)    FH: cardiovascular disease (Father)    FH: diabetes mellitus (Mother, Father)        SOCIAL HISTORY:  Smoking: [ ] Never Smoked [ x] Former Smoker (__ packs x ___ years) [ ] Current Smoker  (__ packs x ___ years)  Substance Use: denies  EtOH Use:denies    Allergies  penicillins (Anaphylaxis)  sulfa drugs (Unknown)    HOME MEDICATIONS: reviewed from EMR    REVIEW OF SYSTEMS:  Constitutional: [ ] fevers [ ] chills [ ] weight loss [ ] weight gain  HEENT: [ ] dry eyes [ ] eye irritation [ ] postnasal drip [ ] nasal congestion  CV: [ ] chest pain [ ] orthopnea [ ] palpitations [ ] murmur  Resp: [x ] cough [x ] shortness of breath [ ] wheezing [ ] sputum [ ] hemoptysis  GI: [ ] nausea [ ] vomiting [ ] diarrhea [ ] constipation [ ] abd pain [ ] dysphagia   : [ ] dysuria [ ] nocturia [ ] hematuria [ ] increased urinary frequency  Musculoskeletal: [ ] myalgias [ ] arthralgias   Skin: [ ] rash [ ] itch  Neurological: [ ] headache [ ] dizziness [ ] syncope [ ] weakness [ ] numbness  Psychiatric: [ ] anxiety [ ] depression  Endocrine: [ ] diabetes [ ] thyroid problem  Hematologic/Lymphatic: [ ] anemia [ ] bleeding problem  Allergic/Immunologic: [ ] itchy eyes [ ] nasal discharge [ ] hives [ ] angioedema  [x ] All other systems negative  [ ] Unable to assess ROS because ________    OBJECTIVE:  ICU Vital Signs Last 24 Hrs  T(C): 36.2 (2022 10:00), Max: 38.6 (2022 16:35)  T(F): 97.2 (2022 10:00), Max: 101.4 (2022 16:35)  HR: 82 (2022 10:00) (51 - 88)  BP: 120/67 (2022 10:00) (79/52 - 120/67)  BP(mean): 74 (2022 20:20) (74 - 74)  ABP: --  ABP(mean): --  RR: 18 (2022 10:00) (18 - 30)  SpO2: 93% (2022 10:00) (93% - 100%)        CAPILLARY BLOOD GLUCOSE      POCT Blood Glucose.: 182 mg/dL (2022 13:14)      PHYSICAL EXAM:  General:  NAD  HEENT:  NC/AT  Neck: Supple  Respiratory:  CTA B/L, no wheezes, crackles or rhonchi  Cardiovascular:  RRR, no m/r/g  Abdomen: soft, NT/ND, +BS  Extremities: no clubbing, cyanosis or edema, warm  Skin: no rash  Neurological: AAOx3, no focal deficits  Psychiatry: not anxious, normal affect    HOSPITAL MEDICATIONS:  MEDICATIONS  (STANDING):  albuterol/ipratropium for Nebulization 3 milliLiter(s) Nebulizer every 6 hours  apixaban 5 milliGRAM(s) Oral two times a day  atorvastatin 40 milliGRAM(s) Oral at bedtime  aztreonam  IVPB 2000 milliGRAM(s) IV Intermittent every 8 hours  budesonide 160 MICROgram(s)/formoterol 4.5 MICROgram(s) Inhaler 2 Puff(s) Inhalation two times a day  citalopram 20 milliGRAM(s) Oral daily  dextrose 5%. 1000 milliLiter(s) (100 mL/Hr) IV Continuous <Continuous>  dextrose 5%. 1000 milliLiter(s) (50 mL/Hr) IV Continuous <Continuous>  dextrose 50% Injectable 25 Gram(s) IV Push once  dextrose 50% Injectable 12.5 Gram(s) IV Push once  dextrose 50% Injectable 25 Gram(s) IV Push once  glucagon  Injectable 1 milliGRAM(s) IntraMuscular once  insulin lispro (ADMELOG) corrective regimen sliding scale   SubCutaneous every 6 hours  metroNIDAZOLE  IVPB 500 milliGRAM(s) IV Intermittent every 8 hours  pantoprazole    Tablet 40 milliGRAM(s) Oral before breakfast  pregabalin 100 milliGRAM(s) Oral three times a day  sodium chloride 0.9%. 1000 milliLiter(s) (75 mL/Hr) IV Continuous <Continuous>  sodium chloride 3%  Inhalation 4 milliLiter(s) Inhalation every 12 hours  tiotropium 18 MICROgram(s) Capsule 1 Capsule(s) Inhalation daily  vancomycin  IVPB 750 milliGRAM(s) IV Intermittent every 12 hours    MEDICATIONS  (PRN):  acetaminophen     Tablet .. 650 milliGRAM(s) Oral every 6 hours PRN Temp greater or equal to 38C (100.4F), Mild Pain (1 - 3), Moderate Pain (4 - 6)  cyclobenzaprine 5 milliGRAM(s) Oral three times a day PRN Muscle Spasm  dextrose Oral Gel 15 Gram(s) Oral once PRN Blood Glucose LESS THAN 70 milliGRAM(s)/deciliter      LABS:                        10.0   7.95  )-----------( 255      ( 2022 07:12 )             33.0     Hgb Trend: 10.0<--, 11.0<--  06-29    133<L>  |  100  |  18  ----------------------------<  220<H>  4.3   |  21<L>  |  0.58    Ca    9.9      2022 07:12  Phos  2.8       Mg     1.5         TPro  6.5  /  Alb  3.1<L>  /  TBili  0.4  /  DBili  x   /  AST  13  /  ALT  19  /  AlkPhos  124<H>      Creatinine Trend: 0.58<--, 0.79<--  PT/INR - ( 2022 16:29 )   PT: 19.9 sec;   INR: 1.72 ratio         PTT - ( 2022 16:29 )  PTT:35.1 sec  Urinalysis Basic - ( 2022 09:40 )    Color: Light Yellow / Appearance: Clear / S.029 / pH: x  Gluc: x / Ketone: Small  / Bili: Negative / Urobili: Negative   Blood: x / Protein: Trace / Nitrite: Negative   Leuk Esterase: Negative / RBC: 20 /hpf / WBC 2 /HPF   Sq Epi: x / Non Sq Epi: 1 /hpf / Bacteria: Negative        Venous Blood Gas:   @ 07:08  7.29/53/37/26/56.4  VBG Lactate: 2.5  Venous Blood Gas:   @ 19:00  7.28/53/25/25/26.7  VBG Lactate: 3.9  Venous Blood Gas:   @ 16:04  7.33/53/18/28/15.3  VBG Lactate: 5.1      MICROBIOLOGY:   Cultures pending  Legionella and Strept Urine Ag pending  Mycoplasma pending    RADIOLOGY:  [x ] Reviewed and interpreted by me  < from: CT Angio Chest PE Protocol w/ IV Cont (22 @ 16:57) >    ACC: [id number] EXAM:  CT ANGIO CHEST PULM [facility]                          PROCEDURE DATE:  2022          INTERPRETATION:  CLINICAL INFORMATION: Shortness of breath    COMPARISON: CT chest 2018    CONTRAST/COMPLICATIONS:  IV Contrast: Omnipaque 350  90 cc administered  Oral Contrast: None  Complications: None reported    PROCEDURE:  CT Angiography of the Chest.  Sagittal and coronal reformats were performed as well as 3D (MIP)   reconstructions.    FINDINGS:    CTA: The contrast bolus is satisfactory. There is no filling defect in   the pulmonary artery or its branches to the segmental level. The heart is   normal in size. No pericardial effusion. There are three-vessel coronary   artery calcifications. The mid ascending aorta measures 3.7 cm.    LUNGS AND AIRWAYS: There is extensive ill-defined nodularity in both   lower lobes and right middle lobes. Layering secretions completely   opacify the bronchus intermedius and right middle and right lower lobe   bronchi. Extensivemucous plugging within segmental and subsegmental   bronchi in both lower lobes and right middle lobe.    PLEURA: No pleural effusion.    MEDIASTINUM AND PEE: No lymphadenopathy. Status post esophagectomy with   gastric pull-through. There is a hiatal hernia.    VISUALIZED UPPER ABDOMEN: Cholecystectomy.    BONES: There is diffuse qualitative osteopenia and multilevel discogenic   disease in the spine. Compression fracture of T11 vertebral body, age   indeterminate although new since 2018.    IMPRESSION:    1.  No pulmonary thromboembolism to the segmental level    2.  Findings the lower lobes compatible with aspiration. Repeat chest CT   scan is recommended in 6-8 weeks following treatment to ensure resolution    3.  Status post esophagectomy    --- End of Report ---   JC BULLOCK MD; Resident Interventional Radiology    < end of copied text >    PULMONARY FUNCTION TESTS: 7/15/21: moderate restrictive dysfunction with moderately reduced DLCO.     EKG:     [facility] - Division of Pulmonary, Critical Care and Sleep Medicine   Please call [phone] between 8am-pm weekdays, [phone] after hours and weekends      CHIEF COMPLAINT:    HPI: 82 yo M former smoker (20 py, quit ) with a h/o COPD, DM, Afib, CAD s/p PCI and stentsx2, esophageal cancer s/p esophagectomy, peripheral neuropathy and HTN who presents with progressive SOB and nonproductive dry cough x3 days. Seen with his wife at bedside who is an Infectious disease nurse. Per wife recently traveled to [place] father's day weekend for his grandson's wedding, was feeling SOB and with bodyaches after, COVID rapid was negative at that time and symptoms were slow to improve until he developed worsening symptoms 3 days ago.  States over the past 10 years has had recurrent episodes of aspiration pneumonia  Spoke with Dr. Delgado's office yesterday, thought to be having a mild exacerbation of his COPD and started on Prednisone taper, however presented to [facility] ED last night as his SpO2 was as low as 83% on home pulse oximetry.    In the ED, hypotensive, tachypneic.  Labs significant for leukocytosis, elevated lactate and hyperkalemia. CXR showed bibasilar opacities.   CTPA negative for PE, sig findings of extensive mucous plugging within segmental and subsegmental bronchi in both lower lobes and right middle lobe;  bilateral lower lobe infiltrates, concerning for aspiration pneumonia. Was started on Sepsis treatment with fluids, Vancomycin and Cefepime.   COVID PCR negative, RVP positive for Entero/Rhinovirus  Home meds: Breo, Incruse and Albuterol    PAST MEDICAL & SURGICAL HISTORY:  Carotid Artery Disease  Myocardial infarction  Paroxysmal a-fib  Diabetes mellitus  Obstructive sleep apnea  Esophageal cancer   Pneumonia  Hypertension  COPD   DVT, lower extremity  Esophageal cancer surgical resection  History of cholecystectomy  History of cataract surgery right eye  Elbow joint symptom infection in bursa          FAMILY HISTORY:  FH: breast cancer (Mother)    FH: cardiovascular disease (Father)    FH: diabetes mellitus (Mother, Father)        SOCIAL HISTORY:  Smoking: [ ] Never Smoked [ x] Former Smoker (__ packs x ___ years) [ ] Current Smoker  (__ packs x ___ years)  Substance Use: denies  EtOH Use:denies      Allergies  penicillins (Anaphylaxis)  sulfa drugs (Unknown)    HOME MEDICATIONS: reviewed from EMR    REVIEW OF SYSTEMS:  Constitutional: [ ] fevers [ ] chills [ ] weight loss [ ] weight gain  HEENT: [ ] dry eyes [ ] eye irritation [ ] postnasal drip [ ] nasal congestion  CV: [ ] chest pain [ ] orthopnea [ ] palpitations [ ] murmur  Resp: [x ] cough [x ] shortness of breath [ ] wheezing [x ] sputum [ ] hemoptysis  GI: [ ] nausea [ ] vomiting [ ] diarrhea [ ] constipation [ ] abd pain [ ] dysphagia   : [ ] dysuria [ ] nocturia [ ] hematuria [ ] increased urinary frequency  Musculoskeletal: [ ] myalgias [ ] arthralgias   Skin: [ ] rash [ ] itch  Neurological: [ ] headache [ ] dizziness [ ] syncope [ ] weakness [ ] numbness  Psychiatric: [ ] anxiety [ ] depression  Endocrine: [ ] diabetes [ ] thyroid problem  Hematologic/Lymphatic: [ ] anemia [ ] bleeding problem  Allergic/Immunologic: [ ] itchy eyes [ ] nasal discharge [ ] hives [ ] angioedema  [x ] All other systems negative  [ ] Unable to assess ROS because ________    OBJECTIVE:  ICU Vital Signs Last 24 Hrs  T(C): 36.2 (2022 10:00), Max: 38.6 (2022 16:35)  T(F): 97.2 (2022 10:00), Max: 101.4 (2022 16:35)  HR: 82 (2022 10:00) (51 - 88)  BP: 120/67 (2022 10:00) (79/52 - 120/67)  BP(mean): 74 (2022 20:20) (74 - 74)  ABP: --  ABP(mean): --  RR: 18 (2022 10:00) (18 - 30)  SpO2: 93% (2022 10:00) (93% - 100%)        CAPILLARY BLOOD GLUCOSE      POCT Blood Glucose.: 182 mg/dL (2022 13:14)      PHYSICAL EXAM:  General:  NAD  HEENT:  NC/AT  Neck: Supple  Respiratory:  scattered rhonchi, no wheezes, or crackles  Cardiovascular:  RRR, no m/r/g  Abdomen: soft, NT/ND, +BS  Extremities: no clubbing, cyanosis or edema, warm  Skin: no rash  Neurological: AAOx3, no focal deficits, generalized weakness    HOSPITAL MEDICATIONS:  MEDICATIONS  (STANDING):  albuterol/ipratropium for Nebulization 3 milliLiter(s) Nebulizer every 6 hours  apixaban 5 milliGRAM(s) Oral two times a day  atorvastatin 40 milliGRAM(s) Oral at bedtime  aztreonam  IVPB 2000 milliGRAM(s) IV Intermittent every 8 hours  budesonide 160 MICROgram(s)/formoterol 4.5 MICROgram(s) Inhaler 2 Puff(s) Inhalation two times a day  citalopram 20 milliGRAM(s) Oral daily  dextrose 5%. 1000 milliLiter(s) (100 mL/Hr) IV Continuous <Continuous>  dextrose 5%. 1000 milliLiter(s) (50 mL/Hr) IV Continuous <Continuous>  dextrose 50% Injectable 25 Gram(s) IV Push once  dextrose 50% Injectable 12.5 Gram(s) IV Push once  dextrose 50% Injectable 25 Gram(s) IV Push once  glucagon  Injectable 1 milliGRAM(s) IntraMuscular once  insulin lispro (ADMELOG) corrective regimen sliding scale   SubCutaneous every 6 hours  metroNIDAZOLE  IVPB 500 milliGRAM(s) IV Intermittent every 8 hours  pantoprazole    Tablet 40 milliGRAM(s) Oral before breakfast  pregabalin 100 milliGRAM(s) Oral three times a day  sodium chloride 0.9%. 1000 milliLiter(s) (75 mL/Hr) IV Continuous <Continuous>  sodium chloride 3%  Inhalation 4 milliLiter(s) Inhalation every 12 hours  tiotropium 18 MICROgram(s) Capsule 1 Capsule(s) Inhalation daily  vancomycin  IVPB 750 milliGRAM(s) IV Intermittent every 12 hours    MEDICATIONS  (PRN):  acetaminophen     Tablet .. 650 milliGRAM(s) Oral every 6 hours PRN Temp greater or equal to 38C (100.4F), Mild Pain (1 - 3), Moderate Pain (4 - 6)  cyclobenzaprine 5 milliGRAM(s) Oral three times a day PRN Muscle Spasm  dextrose Oral Gel 15 Gram(s) Oral once PRN Blood Glucose LESS THAN 70 milliGRAM(s)/deciliter      LABS:                        10.0   7.95  )-----------( 255      ( 2022 07:12 )             33.0     Hgb Trend: 10.0<--, 11.0<--  06-29    133<L>  |  100  |  18  ----------------------------<  220<H>  4.3   |  21<L>  |  0.58    Ca    9.9      2022 07:12  Phos  2.8       Mg     1.5         TPro  6.5  /  Alb  3.1<L>  /  TBili  0.4  /  DBili  x   /  AST  13  /  ALT  19  /  AlkPhos  124<H>      Creatinine Trend: 0.58<--, 0.79<--  PT/INR - ( 2022 16:29 )   PT: 19.9 sec;   INR: 1.72 ratio         PTT - ( 2022 16:29 )  PTT:35.1 sec  Urinalysis Basic - ( 2022 09:40 )    Color: Light Yellow / Appearance: Clear / S.029 / pH: x  Gluc: x / Ketone: Small  / Bili: Negative / Urobili: Negative   Blood: x / Protein: Trace / Nitrite: Negative   Leuk Esterase: Negative / RBC: 20 /hpf / WBC 2 /HPF   Sq Epi: x / Non Sq Epi: 1 /hpf / Bacteria: Negative        Venous Blood Gas:   @ 07:08  7.29/53/37/26/56.4  VBG Lactate: 2.5  Venous Blood Gas:   @ 19:00  7.28/53/25/25/26.7  VBG Lactate: 3.9  Venous Blood Gas:   @ 16:04  7.33/53/18/28/15.3  VBG Lactate: 5.1      MICROBIOLOGY:   Cultures pending  Legionella and Strept Urine Ag pending  Mycoplasma pending    RADIOLOGY:  [x ] Reviewed and interpreted by me  < from: CT Angio Chest PE Protocol w/ IV Cont (22 @ 16:57) >    ACC: [id number] EXAM:  CT ANGIO CHEST PULM [facility]                          PROCEDURE DATE:  2022          INTERPRETATION:  CLINICAL INFORMATION: Shortness of breath    COMPARISON: CT chest 2018    CONTRAST/COMPLICATIONS:  IV Contrast: Omnipaque 350  90 cc administered  Oral Contrast: None  Complications: None reported    PROCEDURE:  CT Angiography of the Chest.  Sagittal and coronal reformats were performed as well as 3D (MIP)   reconstructions.    FINDINGS:    CTA: The contrast bolus is satisfactory. There is no filling defect in   the pulmonary artery or its branches to the segmental level. The heart is   normal in size. No pericardial effusion. There are three-vessel coronary   artery calcifications. The mid ascending aorta measures 3.7 cm.    LUNGS AND AIRWAYS: There is extensive ill-defined nodularity in both   lower lobes and right middle lobes. Layering secretions completely   opacify the bronchus intermedius and right middle and right lower lobe   bronchi. Extensivemucous plugging within segmental and subsegmental   bronchi in both lower lobes and right middle lobe.    PLEURA: No pleural effusion.    MEDIASTINUM AND PEE: No lymphadenopathy. Status post esophagectomy with   gastric pull-through. There is a hiatal hernia.    VISUALIZED UPPER ABDOMEN: Cholecystectomy.    BONES: There is diffuse qualitative osteopenia and multilevel discogenic   disease in the spine. Compression fracture of T11 vertebral body, age   indeterminate although new since 2018.    IMPRESSION:    1.  No pulmonary thromboembolism to the segmental level    2.  Findings the lower lobes compatible with aspiration. Repeat chest CT   scan is recommended in 6-8 weeks following treatment to ensure resolution    3.  Status post esophagectomy    --- End of Report ---   JC BULLOCK MD; Resident Interventional Radiology    < end of copied text >    PULMONARY FUNCTION TESTS: 7/15/21: moderate restrictive dysfunction with moderately reduced DLCO.     EKG:

## 2022-06-29 NOTE — SWALLOW BEDSIDE ASSESSMENT ADULT - PHARYNGEAL PHASE
Aggressive weak wet coughing for ~1 minute post intake of thin liquids; delayed wet coughing post intake of puree; Multiple swallows (x2)/Decreased laryngeal elevation

## 2022-06-29 NOTE — PROGRESS NOTE ADULT - PROBLEM SELECTOR PLAN 7
Microcytic anemia, Hgb=11 on admission. Per outpatient record, last colonoscopy in 2019. Off aspirin. FOBT + in ED. No reports of melena or hematochezia.   -send iron panel, replete as needed  -Reticulocyte count  -B12 and folate  -needs outpt colonoscopy Microcytic anemia, Hgb=11 on admission. Per outpatient record, last colonoscopy in 2019. Off aspirin. FOBT + in ED. No reports of melena or hematochezia.   -send iron panel, replete as needed  -B12 and folate  - check repeat H/H and if trending lower, will d/c apixaban, start Hep gtt and check CBC q 4.  -needs outpt colonoscopy Microcytic anemia, Hgb=11 on admission. Per outpatient record, last colonoscopy in 2019. Off aspirin. FOBT + in ED. No reports of melena or hematochezia.   -send iron panel, replete as needed  -B12 and folate  - check repeat H/H and if trending, will d/c apixaban, start Hep gtt and check CBC q 4- d/w PA  -needs outpt colonoscopy

## 2022-06-29 NOTE — CONSULT NOTE ADULT - SUBJECTIVE AND OBJECTIVE BOX
HPI:  80 yo M PMH of COPD, DM II, Afib, 2 MIs s/p 2 stents, esophageal cancer s/p esophagectomy, peripheral neuropathy and HTN who presents with 3 days of SOB and dry cough. He states that his shortness of breath began 3 days ago while at rest and was associated with a dry cough that was not productive of any sputum. He states that since then his symptoms have worsened and he consulted his outpatient pulmonologist who prescribed him steroids earlier today, however he was unable to take them prior to presentation. Home pulse oximetry today showed SpO2 of mid to low 80s which prompted him to seek transport to the hospital. Upon arrival to the ED, he was found to be hypotensive, febrile, tachypnic. Labs were significant for leukocytosis, elevated lactate and hyperkalemic. CXR showed bibasilar atelectasis with possible pleural effusions bilaterally. CT angio showed no evidence of PE and lower lobe infiltrates compatible with possible aspiration pneumonia. EKG showed no acute ischemic changes. He was given fluids, vanc and cefepime.  (2022 20:33)      PAST MEDICAL & SURGICAL HISTORY:  Carotid Artery Disease      Myocardial infarction      Paroxysmal a-fib      Diabetes mellitus      Obstructive sleep apnea      Esophageal cancer      Pneumonia      Hypertension      COPD exacerbation      DVT, lower extremity      Esophageal cancer  surgical resection      History of cholecystectomy      History of cataract surgery  right eye      Elbow joint symptom  infection in bursa          Allergies    penicillins (Anaphylaxis)  sulfa drugs (Unknown)    Intolerances        ANTIMICROBIALS:  aztreonam  IVPB 2000 every 8 hours  metroNIDAZOLE  IVPB 500 every 8 hours  vancomycin  IVPB 750 every 12 hours      OTHER MEDS:  acetaminophen     Tablet .. 650 milliGRAM(s) Oral every 6 hours PRN  albuterol/ipratropium for Nebulization 3 milliLiter(s) Nebulizer every 6 hours  apixaban 5 milliGRAM(s) Oral two times a day  atorvastatin 40 milliGRAM(s) Oral at bedtime  budesonide 160 MICROgram(s)/formoterol 4.5 MICROgram(s) Inhaler 2 Puff(s) Inhalation two times a day  citalopram 20 milliGRAM(s) Oral daily  cyclobenzaprine 5 milliGRAM(s) Oral three times a day PRN  dextrose 5%. 1000 milliLiter(s) IV Continuous <Continuous>  dextrose 5%. 1000 milliLiter(s) IV Continuous <Continuous>  dextrose 50% Injectable 25 Gram(s) IV Push once  dextrose 50% Injectable 12.5 Gram(s) IV Push once  dextrose 50% Injectable 25 Gram(s) IV Push once  dextrose Oral Gel 15 Gram(s) Oral once PRN  glucagon  Injectable 1 milliGRAM(s) IntraMuscular once  insulin lispro (ADMELOG) corrective regimen sliding scale   SubCutaneous every 6 hours  metoprolol tartrate 12.5 milliGRAM(s) Oral every 12 hours  pantoprazole    Tablet 40 milliGRAM(s) Oral before breakfast  pregabalin 100 milliGRAM(s) Oral three times a day  sodium chloride 3%  Inhalation 4 milliLiter(s) Inhalation every 12 hours  tiotropium 18 MICROgram(s) Capsule 1 Capsule(s) Inhalation daily      SOCIAL HISTORY:    no smoking, alcohol or drug abuse  no recent travel    FAMILY HISTORY:  FH: breast cancer (Mother)    FH: cardiovascular disease (Father)    FH: diabetes mellitus (Mother, Father)        ROS:  Unobtainable because:   All other systems negative     Constitutional: no fever, no chills, no weight loss, no night sweats  Eye: no eye pain, no redness, no vision changes  ENT:  no sore throat, no rhinorrhea  Cardiovascular:  no chest pain, no palpitation  Respiratory:  no SOB, no cough  GI:  no abd pain, no vomiting, no diarrhea  urinary: no dysuria, no hematuria, no flank pain  : no  discharge or bleeding  musculoskeletal:  no joint pain, no joint swelling  skin:  no rash  neurology:  no headache, no seizure, no change in mental status  psych: no anxiety, no depression     Physical Exam:    General:    NAD, non toxic  Head: atraumatic, normocephalic  Eyes: normal sclera and conjunctiva  ENT:   no oropharyngeal lesions, no LAD, neck supple  Cardio:    regular S1,S2, no murmur  Respiratory:   clear b/l, no wheezing  abd:   soft, BS +, not tender  :     no CVAT, no suprapubic tenderness, no balbuena  Musculoskeletal : no joint swelling, no edema  Skin:    no rash  vascular: no phlebitis  Neurologic:     no focal deficits  psych: normal affect      Drug Dosing Weight  Height (cm): 172.7 (2022 14:22)    Vital Signs Last 24 Hrs  T(F): 98.1 (22 @ 15:15), Max: 101.4 (22 @ 16:35)    Vital Signs Last 24 Hrs  HR: 89 (22 @ 15:15) (51 - 89)  BP: 105/69 (22 @ 15:15) (79/52 - 120/67)  RR: 18 (22 @ 15:15)  SpO2: 98% (22 @ 15:15) (93% - 100%)  Wt(kg): --                          10.0   7.95  )-----------( 255      ( 2022 07:12 )             33.0           133<L>  |  100  |  18  ----------------------------<  220<H>  4.3   |  21<L>  |  0.58    Ca    9.9      2022 07:12  Phos  2.8       Mg     1.5         TPro  6.5  /  Alb  3.1<L>  /  TBili  0.4  /  DBili  x   /  AST  13  /  ALT  19  /  AlkPhos  124<H>        Urinalysis Basic - ( 2022 09:40 )    Color: Light Yellow / Appearance: Clear / S.029 / pH: x  Gluc: x / Ketone: Small  / Bili: Negative / Urobili: Negative   Blood: x / Protein: Trace / Nitrite: Negative   Leuk Esterase: Negative / RBC: 20 /hpf / WBC 2 /HPF   Sq Epi: x / Non Sq Epi: 1 /hpf / Bacteria: Negative        MICROBIOLOGY:  v      Rapid RVP Result: Detected ( @ 16:28)          RADIOLOGY:    Images independently visualized and reviewed personally,  findings as below HPI:  82 yo M PMH of COPD, DM II, Afib, 2 MIs s/p 2 stents, esophageal cancer s/p esophagectomy, peripheral neuropathy and HTN who presents with 3 days of SOB and dry cough. He states that his shortness of breath began 3 days ago while at rest and was associated with a dry cough that was not productive of any sputum. He states that since then his symptoms have worsened and he consulted his outpatient pulmonologist who prescribed him steroids earlier today, however he was unable to take them prior to presentation. Home pulse oximetry today showed SpO2 of mid to low 80s which prompted him to seek transport to the hospital. Upon arrival to the ED, he was found to be hypotensive, febrile, tachypnic. Labs were significant for leukocytosis, elevated lactate and hyperkalemic. CXR showed bibasilar atelectasis with possible pleural effusions bilaterally. CT angio showed no evidence of PE and lower lobe infiltrates compatible with possible aspiration pneumonia. EKG showed no acute ischemic changes. He was given fluids, vanc and cefepime.  (2022 20:33)      PAST MEDICAL & SURGICAL HISTORY:  Carotid Artery Disease      Myocardial infarction      Paroxysmal a-fib      Diabetes mellitus      Obstructive sleep apnea      Esophageal cancer      Pneumonia      Hypertension      COPD exacerbation      DVT, lower extremity      Esophageal cancer  surgical resection      History of cholecystectomy      History of cataract surgery  right eye      Elbow joint symptom  infection in bursa          Allergies    penicillins (Anaphylaxis)  sulfa drugs (Unknown)    Intolerances        ANTIMICROBIALS:  aztreonam  IVPB 2000 every 8 hours  metroNIDAZOLE  IVPB 500 every 8 hours  vancomycin  IVPB 750 every 12 hours      OTHER MEDS:  acetaminophen     Tablet .. 650 milliGRAM(s) Oral every 6 hours PRN  albuterol/ipratropium for Nebulization 3 milliLiter(s) Nebulizer every 6 hours  apixaban 5 milliGRAM(s) Oral two times a day  atorvastatin 40 milliGRAM(s) Oral at bedtime  budesonide 160 MICROgram(s)/formoterol 4.5 MICROgram(s) Inhaler 2 Puff(s) Inhalation two times a day  citalopram 20 milliGRAM(s) Oral daily  cyclobenzaprine 5 milliGRAM(s) Oral three times a day PRN  dextrose 5%. 1000 milliLiter(s) IV Continuous <Continuous>  dextrose 5%. 1000 milliLiter(s) IV Continuous <Continuous>  dextrose 50% Injectable 25 Gram(s) IV Push once  dextrose 50% Injectable 12.5 Gram(s) IV Push once  dextrose 50% Injectable 25 Gram(s) IV Push once  dextrose Oral Gel 15 Gram(s) Oral once PRN  glucagon  Injectable 1 milliGRAM(s) IntraMuscular once  insulin lispro (ADMELOG) corrective regimen sliding scale   SubCutaneous every 6 hours  metoprolol tartrate 12.5 milliGRAM(s) Oral every 12 hours  pantoprazole    Tablet 40 milliGRAM(s) Oral before breakfast  pregabalin 100 milliGRAM(s) Oral three times a day  sodium chloride 3%  Inhalation 4 milliLiter(s) Inhalation every 12 hours  tiotropium 18 MICROgram(s) Capsule 1 Capsule(s) Inhalation daily      SOCIAL HISTORY:  , lives with his wife, former smoker  no  alcohol or drug abuse  traveled to [place] over the weekend    FAMILY HISTORY:  FH: breast cancer (Mother)    FH: cardiovascular disease (Father)    FH: diabetes mellitus (Mother, Father)        ROS:    All other systems negative     Constitutional: + fever  Eye: no eye pain, no redness, no vision changes  ENT:  + sore throat, + rhinorrhea but now resolved  Cardiovascular:  no chest pain, no palpitation  Respiratory:  + SOB, + cough  GI:  no abd pain, no vomiting, no diarrhea  urinary: no dysuria, no hematuria, no flank pain  : no penile discharge or bleeding  musculoskeletal:  no joint pain, no joint swelling  skin:  no rash  neurology:  no headache, no seizure, no change in mental status  psych: no anxiety, no depression     Physical Exam:    General:    NAD, non toxic  Head: atraumatic, normocephalic  Eyes: normal sclera and conjunctiva  ENT:   no oropharyngeal lesions, no LAD, neck supple  Cardio:    regular S1,S2  Respiratory:   b/l coarse breath sounds, comfortable on NC  abd:   soft, BS +, not tender  :     no CVAT, no suprapubic tenderness, no balbuena  Musculoskeletal : no joint swelling, no edema  Skin:    no rash  vascular: no phlebitis  Neurologic: A7O x 3  psych: normal affect      Drug Dosing Weight  Height (cm): 172.7 (2022 14:22)    Vital Signs Last 24 Hrs  T(F): 98.1 (22 @ 15:15), Max: 101.4 (22 @ 16:35)    Vital Signs Last 24 Hrs  HR: 89 (22 @ 15:15) (51 - 89)  BP: 105/69 (22 @ 15:15) (79/52 - 120/67)  RR: 18 (22 @ 15:15)  SpO2: 98% (22 @ 15:15) (93% - 100%)  Wt(kg): --                          10.0   7.95  )-----------( 255      ( 2022 07:12 )             33.0           133<L>  |  100  |  18  ----------------------------<  220<H>  4.3   |  21<L>  |  0.58    Ca    9.9      2022 07:12  Phos  2.8       Mg     1.5         TPro  6.5  /  Alb  3.1<L>  /  TBili  0.4  /  DBili  x   /  AST  13  /  ALT  19  /  AlkPhos  124<H>        Urinalysis Basic - ( 2022 09:40 )    Color: Light Yellow / Appearance: Clear / S.029 / pH: x  Gluc: x / Ketone: Small  / Bili: Negative / Urobili: Negative   Blood: x / Protein: Trace / Nitrite: Negative   Leuk Esterase: Negative / RBC: 20 /hpf / WBC 2 /HPF   Sq Epi: x / Non Sq Epi: 1 /hpf / Bacteria: Negative        MICROBIOLOGY:  v      Rapid RVP Result: Detected ( @ 16:28)          RADIOLOGY:    Images independently visualized and reviewed personally,  findings as below  < from: Xray Chest 1 View- PORTABLE-Urgent (22 @ 17:11) >  IMPRESSION:  Bibasilar atelectasis and/or pleural effusion.      < end of copied text >  < from: CT Angio Chest PE Protocol w/ IV Cont (22 @ 16:57) >    IMPRESSION:    1.  No pulmonary thromboembolism to the segmental level    2.  Findings the lower lobes compatible with aspiration. Repeat chest CT   scan is recommended in 6-8 weeks following treatment to ensure resolution    3.  Status post esophagectomy      < end of copied text >

## 2022-06-29 NOTE — PHYSICAL THERAPY INITIAL EVALUATION ADULT - PERTINENT HX OF CURRENT PROBLEM, REHAB EVAL
82 yo M PMH of COPD, DM II, Afib, CAD s/p 3 stents, esophageal cancer s/p esophagectomy, peripheral neuropathy and HTN who presents with 3 days of SOB and dry cough. He met SIRS criteria (hypotensive, febrile, tachypnic and leukocytotic) on presentation and was admitted for sepsis 2/2 to pneumonia in the setting of COPD exacerbation. CT chest: 1. No pulmonary thromboembolism to the segmental level.

## 2022-06-29 NOTE — PHYSICAL THERAPY INITIAL EVALUATION ADULT - ADDITIONAL COMMENTS
Pt states PLOF pt able to transfer from recliner <> wheel chair independently. Pt states the past week has been more difficult transferring and has required assistance. Pt has been non-amb for the past 3 months. Pt has HHA 6 days/4 hours a day to assist with ADL's (bathing, dressing) and functional mobility. Pt states when aides are not present if he needs to have a bowel movement, he sits in feces until the next day when they are present.  Pt resides in apt with spouse, +elevator access. Pt sleeps in recliner. Pt owns RW and wheel chair. Pt would benefit from 3:1 commode and mechanical lift device.

## 2022-06-29 NOTE — SWALLOW BEDSIDE ASSESSMENT ADULT - SPECIFY REASON(S)
to clinically evaluate pt's jaky-pharyngeal swallow mechanism. Per consult, "c/f aspiration given CT chest findings."

## 2022-06-29 NOTE — SWALLOW BEDSIDE ASSESSMENT ADULT - SWALLOW EVAL: PATIENT/FAMILY GOALS STATEMENT
"I am so hungry and thirsty." Pt endorsed occasional coughing/choking during meals. Reported he "aspirated on a piece of penne pasta 3 days ago." Pt/spouse endorsed h/o aspiration PNA (per pt two 10+ years ago, per spouse "more recent than that"). Pt reported he "may have had a swallow test a long long time ago."

## 2022-06-29 NOTE — CONSULT NOTE ADULT - ASSESSMENT
Multifocal pneumonia, sepsis, entero/rhinovirus, may have superimposed bacterial infection  Currently on broad spectrum coverage with Vanco and Aztreonam, ID to evaluate as well  Follow up all cultures  SLP evaluation  Acapella device to aid airway clearance, bronchodilators  Spiriva and Symbicort      INCOMPLETE NOTE 82 yo M with a h/o COPD, esophageal cancer s/p esophagectomy, recurrent aspiration pneumonia in the past who presents with progressive dyspnea and cough, acute hypoxemia at home. Multifocal pneumonia, sepsis, entero/rhinovirus, likely superimposed bacterial infection    Currently on broad spectrum coverage with Vanco and Aztreonam, ID to evaluate as well  Follow up all cultures  Acapella device to aid airway clearance- reviewed technique and to use after nebulizer treatments including hypersal, Incentive spirometer  c/w Bronchodilators, Spiriva and Symbicort  Would hold off on steroids at this time, patient is not bronchospastic on exam  Supplemental O2, goal O2 sats 92-96%  Aspiration precautions, SLP evaluation  DVT ppx     Thank you for the consult  Dr. Delgado to follow up    Above was discussed with the patient and his wife at length, all questions answered.

## 2022-06-29 NOTE — SWALLOW BEDSIDE ASSESSMENT ADULT - SLP PERTINENT HISTORY OF CURRENT PROBLEM
80 y/o M with PMH of COPD, DM II, Afib, 2 MIs s/p 2 stents, esophageal cancer s/p esophagectomy, peripheral neuropathy and HTN who presents with 3 days of SOB and dry cough. Symptoms have worsened and he consulted his OP pulmonologist who prescribed him steroids earlier today. Home pulse ox today showed SpO2 of mid to low 80s which prompted him to seek transport to the hospital. Upon arrival to the ED, he was found to be hypotensive, febrile, tachypnic. Labs were significant for leukocytosis, elevated lactate and hyperkalemic. CXR showed bibasilar atelectasis with possible pleural effusions bilaterally. CT angio showed no evidence of PE and lower lobe infiltrates compatible with possible aspiration pneumonia. EKG showed no acute ischemic changes. He was given fluids, vanc and cefepime. Admitted for sepsis 2/2 to pneumonia in the setting of COPD exacerbation. NPO pending s/s eval.

## 2022-06-29 NOTE — PROGRESS NOTE ADULT - PROBLEM SELECTOR PLAN 1
SIRS (hypotension, febrile and tachypnic), likely source=pneumonia. S/p 1 dose IV vanco in ED and started on cefepime. CT angio showed concerns for aspiration pneumonia. +enterovirus  -cefepime changed to Vanco and Aztreonam given anaphylaxis to PCN. Holding off on Quinolones as pt has dilated thoracic aorta.  -f/u blood cultures  -check urine legionella. check EKG for QTc to see if Azithro can be added safely  -s/p 2 L IVF in the ED, c/w maintenance IVF (75 cc/hr for 24 hours)  - ID consult to consolidate abx or hold given +enterovirus although +bandemia and hypotension on admission SIRS (hypotension, febrile and tachypnic), likely source=pneumonia. S/p 1 dose IV vanco in ED and started on cefepime. CT angio showed concerns for aspiration pneumonia. +enterovirus  -cefepime changed to Vanco and Aztreonam given anaphylaxis to PCN. Holding off on Quinolones as pt has dilated thoracic aorta.  -f/u blood cultures  -check urine legionella.   -s/p 2 L IVF in the ED, c/w maintenance IVF (75 cc/hr for 24 hours)  - ID consult to consolidate abx or hold given +enterovirus although +bandemia and hypotension on admission

## 2022-06-29 NOTE — CONSULT NOTE ADULT - ASSESSMENT
81 m with DM II, HTN, Afib, CAD, 2 MIs s/p 2 stents, COPD with chronic cough, esophageal cancer s/p esophagectomy, p/w fever, SOB, cough, rhinorrhea and sore throat. Cough has been worse for about 2 weeks as per the patient and is dry, sometimes cough while eating and drinking  here febrile, hypotensive, tachycardic, tachypneic and hypoxic, WBC: 11  RVP: entro/rhino  chest CTA: no PE, extensive ill-defined nodularity in both lower lobes and right middle lobes s/o aspiration. Layering secretions completely opacify the bronchus intermedius and right middle and right lower lobe bronchi. Extensive mucous plugging within segmental and subsegmental bronchi in both lower lobes and right middle lobe.    fever, hypotension, tachycardia, tachypnea, hypoxic resp failure, sepsis, viral URI due to entro/rhino   evidence of aspiration pneumonia on CT (h/o esophageal marge s/p esophagectomy ) but cough has been the same for 2 weeks, only fever, hypoxia, URI symptoms are new, ?COPD exacerbation with mucous plug   anaphylaxis to penicillin but was given cefepime in ER and tolerated well    * f/u the blood cx  * check sputum cx  * discontinue vanco, aztreonam, metro and start cefepime 2 q 12  * chest PT  * speech and swallow  * monitor WBC/diff and temp curve    The above assessment and plan was discussed with the primary team    Abi Nichols MD  contact on teams  After 5pm and on weekends call [phone]

## 2022-06-29 NOTE — PROGRESS NOTE ADULT - SUBJECTIVE AND OBJECTIVE BOX
Shlomo Barragan   contact via pager or TEAMS        CC: Patient is a 81y old  Male who presents with a chief complaint of Sepsis 2/2 PNA, COPD exacerbation (2022 20:33)      SUBJECTIVE / OVERNIGHT EVENTS:    MEDICATIONS  (STANDING):  albuterol/ipratropium for Nebulization 3 milliLiter(s) Nebulizer every 6 hours  apixaban 5 milliGRAM(s) Oral two times a day  atorvastatin 40 milliGRAM(s) Oral at bedtime  budesonide 160 MICROgram(s)/formoterol 4.5 MICROgram(s) Inhaler 2 Puff(s) Inhalation two times a day  cefepime   IVPB 2000 milliGRAM(s) IV Intermittent every 8 hours  citalopram 20 milliGRAM(s) Oral daily  dextrose 5%. 1000 milliLiter(s) (100 mL/Hr) IV Continuous <Continuous>  dextrose 5%. 1000 milliLiter(s) (50 mL/Hr) IV Continuous <Continuous>  dextrose 50% Injectable 25 Gram(s) IV Push once  dextrose 50% Injectable 12.5 Gram(s) IV Push once  dextrose 50% Injectable 25 Gram(s) IV Push once  glucagon  Injectable 1 milliGRAM(s) IntraMuscular once  insulin lispro (ADMELOG) corrective regimen sliding scale   SubCutaneous every 6 hours  metroNIDAZOLE  IVPB 500 milliGRAM(s) IV Intermittent every 8 hours  pantoprazole    Tablet 40 milliGRAM(s) Oral before breakfast  predniSONE   Tablet 40 milliGRAM(s) Oral daily  pregabalin 100 milliGRAM(s) Oral three times a day  sodium chloride 0.9%. 1000 milliLiter(s) (75 mL/Hr) IV Continuous <Continuous>  sodium chloride 3%  Inhalation 4 milliLiter(s) Inhalation every 12 hours  tiotropium 18 MICROgram(s) Capsule 1 Capsule(s) Inhalation daily    MEDICATIONS  (PRN):  acetaminophen     Tablet .. 650 milliGRAM(s) Oral every 6 hours PRN Temp greater or equal to 38C (100.4F), Mild Pain (1 - 3), Moderate Pain (4 - 6)  cyclobenzaprine 5 milliGRAM(s) Oral three times a day PRN Muscle Spasm  dextrose Oral Gel 15 Gram(s) Oral once PRN Blood Glucose LESS THAN 70 milliGRAM(s)/deciliter      Vital Signs Last 24 Hrs  T(C): 36.2 (2022 10:00), Max: 38.6 (2022 16:35)  T(F): 97.2 (2022 10:00), Max: 101.4 (2022 16:35)  HR: 82 (2022 10:00) (51 - 94)  BP: 120/67 (2022 10:00) (79/52 - 120/67)  BP(mean): 74 (2022 20:20) (74 - 74)  RR: 18 (2022 10:00) (18 - 30)  SpO2: 93% (2022 10:00) (86% - 100%)  CAPILLARY BLOOD GLUCOSE      POCT Blood Glucose.: 182 mg/dL (2022 13:14)  POCT Blood Glucose.: 210 mg/dL (2022 06:38)  POCT Blood Glucose.: 247 mg/dL (2022 02:23)  POCT Blood Glucose.: 201 mg/dL (2022 22:35)  POCT Blood Glucose.: 215 mg/dL (2022 18:59)    I&O's Summary    tele:    PHYSICAL EXAM:    GENERAL: NAD   HEENT: EOMI, PERRL  PULM: Clear to auscultation bilaterally  CV: Regular rate and rhythm; nl S1, S2; No murmurs, rubs, or gallops  ABDOMEN: Soft, Nontender, Nondistended; Bowel sounds present  EXTREMITIES/MSK:  No edema, calf tenderness   PSYCH: AAOx3  NEUROLOGY: non-focal          LABS:                        10.0   7.95  )-----------( 255      ( 2022 07:12 )             33.0         133<L>  |  100  |  18  ----------------------------<  220<H>  4.3   |  21<L>  |  0.58    Ca    9.9      2022 07:12  Phos  2.8       Mg     1.5         TPro  6.5  /  Alb  3.1<L>  /  TBili  0.4  /  DBili  x   /  AST  13  /  ALT  19  /  AlkPhos  124<H>      PT/INR - ( 2022 16:29 )   PT: 19.9 sec;   INR: 1.72 ratio         PTT - ( 2022 16:29 )  PTT:35.1 sec      Urinalysis Basic - ( 2022 09:40 )    Color: Light Yellow / Appearance: Clear / S.029 / pH: x  Gluc: x / Ketone: Small  / Bili: Negative / Urobili: Negative   Blood: x / Protein: Trace / Nitrite: Negative   Leuk Esterase: Negative / RBC: 20 /hpf / WBC 2 /HPF   Sq Epi: x / Non Sq Epi: 1 /hpf / Bacteria: Negative          RADIOLOGY & ADDITIONAL TESTS:    Imaging Personally Reviewed:    Consultant(s) Notes Reviewed:      Care Discussed with Consultants/Other Providers:   Shlomo Barragan   contact via pager or TEAMS        CC: Patient is a 81y old  Male who presents with a chief complaint of Sepsis 2/2 PNA, COPD exacerbation (2022 20:33)      SUBJECTIVE / OVERNIGHT EVENTS: pt reports dyspnea and cough sig improved. no CP. no f/c/r. no n/v/d/abd pain.     MEDICATIONS  (STANDING):  albuterol/ipratropium for Nebulization 3 milliLiter(s) Nebulizer every 6 hours  apixaban 5 milliGRAM(s) Oral two times a day  atorvastatin 40 milliGRAM(s) Oral at bedtime  budesonide 160 MICROgram(s)/formoterol 4.5 MICROgram(s) Inhaler 2 Puff(s) Inhalation two times a day  cefepime   IVPB 2000 milliGRAM(s) IV Intermittent every 8 hours  citalopram 20 milliGRAM(s) Oral daily  dextrose 5%. 1000 milliLiter(s) (100 mL/Hr) IV Continuous <Continuous>  dextrose 5%. 1000 milliLiter(s) (50 mL/Hr) IV Continuous <Continuous>  dextrose 50% Injectable 25 Gram(s) IV Push once  dextrose 50% Injectable 12.5 Gram(s) IV Push once  dextrose 50% Injectable 25 Gram(s) IV Push once  glucagon  Injectable 1 milliGRAM(s) IntraMuscular once  insulin lispro (ADMELOG) corrective regimen sliding scale   SubCutaneous every 6 hours  metroNIDAZOLE  IVPB 500 milliGRAM(s) IV Intermittent every 8 hours  pantoprazole    Tablet 40 milliGRAM(s) Oral before breakfast  predniSONE   Tablet 40 milliGRAM(s) Oral daily  pregabalin 100 milliGRAM(s) Oral three times a day  sodium chloride 0.9%. 1000 milliLiter(s) (75 mL/Hr) IV Continuous <Continuous>  sodium chloride 3%  Inhalation 4 milliLiter(s) Inhalation every 12 hours  tiotropium 18 MICROgram(s) Capsule 1 Capsule(s) Inhalation daily    MEDICATIONS  (PRN):  acetaminophen     Tablet .. 650 milliGRAM(s) Oral every 6 hours PRN Temp greater or equal to 38C (100.4F), Mild Pain (1 - 3), Moderate Pain (4 - 6)  cyclobenzaprine 5 milliGRAM(s) Oral three times a day PRN Muscle Spasm  dextrose Oral Gel 15 Gram(s) Oral once PRN Blood Glucose LESS THAN 70 milliGRAM(s)/deciliter      Vital Signs Last 24 Hrs  T(C): 36.2 (2022 10:00), Max: 38.6 (2022 16:35)  T(F): 97.2 (2022 10:00), Max: 101.4 (2022 16:35)  HR: 82 (2022 10:00) (51 - 94)  BP: 120/67 (2022 10:00) (79/52 - 120/67)  BP(mean): 74 (2022 20:20) (74 - 74)  RR: 18 (2022 10:00) (18 - 30)  SpO2: 93% (2022 10:00) (86% - 100%)  CAPILLARY BLOOD GLUCOSE      POCT Blood Glucose.: 182 mg/dL (2022 13:14)  POCT Blood Glucose.: 210 mg/dL (2022 06:38)  POCT Blood Glucose.: 247 mg/dL (2022 02:23)  POCT Blood Glucose.: 201 mg/dL (2022 22:35)  POCT Blood Glucose.: 215 mg/dL (2022 18:59)    I&O's Summary        PHYSICAL EXAM:    GENERAL: NAD   HEENT: EOMI, PERRL  PULM: Clear to auscultation bilaterally  CV: Regular rate and rhythm; nl S1, S2; No murmurs, rubs, or gallops  ABDOMEN: Soft, Nontender, Nondistended; Bowel sounds present  EXTREMITIES/MSK:  No edema, calf tenderness   PSYCH: AAOx3  NEUROLOGY: non-focal          LABS:                        10.0   7.95  )-----------( 255      ( 2022 07:12 )             33.0     06-29    133<L>  |  100  |  18  ----------------------------<  220<H>  4.3   |  21<L>  |  0.58    Ca    9.9      2022 07:12  Phos  2.8     06-29  Mg     1.5     06-29    TPro  6.5  /  Alb  3.1<L>  /  TBili  0.4  /  DBili  x   /  AST  13  /  ALT  19  /  AlkPhos  124<H>  06-29    PT/INR - ( 2022 16:29 )   PT: 19.9 sec;   INR: 1.72 ratio         PTT - ( 2022 16:29 )  PTT:35.1 sec      Urinalysis Basic - ( 2022 09:40 )    Color: Light Yellow / Appearance: Clear / S.029 / pH: x  Gluc: x / Ketone: Small  / Bili: Negative / Urobili: Negative   Blood: x / Protein: Trace / Nitrite: Negative   Leuk Esterase: Negative / RBC: 20 /hpf / WBC 2 /HPF   Sq Epi: x / Non Sq Epi: 1 /hpf / Bacteria: Negative          RADIOLOGY & ADDITIONAL TESTS:    Imaging Personally Reviewed:    Consultant(s) Notes Reviewed:      Care Discussed with Consultants/Other Providers:

## 2022-06-29 NOTE — SWALLOW BEDSIDE ASSESSMENT ADULT - SLP GENERAL OBSERVATIONS
Pt was received in ED stretcher awake and alert. +NC (3L). He was AAOx4, pleasant, and cooperative. Able to follow all complex commands and answer all questions for purposes of evaluation. Good historian.

## 2022-06-30 NOTE — PROGRESS NOTE ADULT - PROBLEM SELECTOR PLAN 1
S/p 1 dose IV vanco in ED and started on cefepime. CT angio showed concerns for aspiration pneumonia. +enterovirus  -cefepime. Holding off on Quinolones as pt has dilated thoracic aorta.  -f/u blood cultures  -legionella neg  - ID consult apprec

## 2022-06-30 NOTE — SWALLOW VFSS/MBS ASSESSMENT ADULT - DIAGNOSTIC IMPRESSIONS
82 y/o M with PMH of esophageal cancer s/p esophagectomy (per pt 20 years ago with 80% of esophagus removed and then stretched) p/w x3 days of SOB and dry cough, admitted for SEPSIS 2/2 to PNA. CT CHEST finding c/f aspiration. Pt was seen today for Modified Barium Swallow Study (MBS) which revealed jaky-pharyngeal dysphagia. The oral stage was characterized by piecemeal deglutition (x3). The pharyngeal stage was characterized by single instance of trace silent aspiration of puree. No other instances of penetration or aspiration observed on extensive trials of puree, solids, or thin liquids (via single and consecutive sips, cups and straws). Trace-mild pharyngeal residue throughout the study. Disorders: reduced supraglottic/subglottic sensation.

## 2022-06-30 NOTE — PROGRESS NOTE ADULT - SUBJECTIVE AND OBJECTIVE BOX
Shlomo Barragan   contact via pager or TEAMS        CC: Patient is a 81y old  Male who presents with a chief complaint of Sepsis 2/2 PNA, COPD exacerbation (2022 11:58)      SUBJECTIVE / OVERNIGHT EVENTS:    MEDICATIONS  (STANDING):  acetylcysteine 10%  Inhalation 4 milliLiter(s) Inhalation every 8 hours  albuterol/ipratropium for Nebulization 3 milliLiter(s) Nebulizer every 6 hours  atorvastatin 40 milliGRAM(s) Oral at bedtime  budesonide 160 MICROgram(s)/formoterol 4.5 MICROgram(s) Inhaler 2 Puff(s) Inhalation two times a day  cefepime   IVPB 2000 milliGRAM(s) IV Intermittent every 12 hours  citalopram 20 milliGRAM(s) Oral daily  dextrose 5%. 1000 milliLiter(s) (100 mL/Hr) IV Continuous <Continuous>  dextrose 5%. 1000 milliLiter(s) (50 mL/Hr) IV Continuous <Continuous>  dextrose 50% Injectable 25 Gram(s) IV Push once  dextrose 50% Injectable 12.5 Gram(s) IV Push once  dextrose 50% Injectable 25 Gram(s) IV Push once  glucagon  Injectable 1 milliGRAM(s) IntraMuscular once  heparin  Infusion.  Unit(s)/Hr (9.5 mL/Hr) IV Continuous <Continuous>  insulin glargine Injectable (LANTUS) 2 Unit(s) SubCutaneous every morning  insulin lispro (ADMELOG) corrective regimen sliding scale   SubCutaneous three times a day before meals  insulin lispro (ADMELOG) corrective regimen sliding scale   SubCutaneous at bedtime  metoprolol tartrate 12.5 milliGRAM(s) Oral every 12 hours  pantoprazole  Injectable 40 milliGRAM(s) IV Push every 12 hours  predniSONE   Tablet 40 milliGRAM(s) Oral daily  pregabalin 100 milliGRAM(s) Oral three times a day  sodium chloride 3%  Inhalation 4 milliLiter(s) Inhalation every 12 hours  tiotropium 18 MICROgram(s) Capsule 1 Capsule(s) Inhalation daily    MEDICATIONS  (PRN):  acetaminophen     Tablet .. 650 milliGRAM(s) Oral every 6 hours PRN Temp greater or equal to 38C (100.4F), Mild Pain (1 - 3), Moderate Pain (4 - 6)  cyclobenzaprine 5 milliGRAM(s) Oral three times a day PRN Muscle Spasm  dextrose Oral Gel 15 Gram(s) Oral once PRN Blood Glucose LESS THAN 70 milliGRAM(s)/deciliter  heparin   Injectable 4700 Unit(s) IV Push every 6 hours PRN For aPTT less than 40      Vital Signs Last 24 Hrs  T(C): 36.6 (2022 08:23), Max: 36.9 (2022 17:36)  T(F): 97.8 (2022 08:23), Max: 98.4 (2022 17:36)  HR: 98 (2022 08:23) (89 - 98)  BP: 106/66 (2022 08:23) (102/62 - 108/69)  BP(mean): --  RR: 18 (2022 08:23) (18 - 18)  SpO2: 97% (2022 08:23) (91% - 98%)  CAPILLARY BLOOD GLUCOSE      POCT Blood Glucose.: 250 mg/dL (2022 08:17)  POCT Blood Glucose.: 237 mg/dL (2022 21:17)  POCT Blood Glucose.: 200 mg/dL (2022 17:39)  POCT Blood Glucose.: 182 mg/dL (2022 13:14)    I&O's Summary    2022 07:01  -  2022 07:00  --------------------------------------------------------  IN: 0 mL / OUT: 100 mL / NET: -100 mL      tele:    PHYSICAL EXAM:    GENERAL: NAD   HEENT: EOMI, PERRL  PULM: Clear to auscultation bilaterally  CV: Regular rate and rhythm; nl S1, S2; No murmurs, rubs, or gallops  ABDOMEN: Soft, Nontender, Nondistended; Bowel sounds present  EXTREMITIES/MSK:  No edema, calf tenderness   PSYCH: AAOx3  NEUROLOGY: non-focal          LABS:                        9.9    9.85  )-----------( 305      ( 2022 12:13 )             32.6     06-30    134<L>  |  100  |  14  ----------------------------<  238<H>  3.9   |  22  |  0.49<L>    Ca    10.0      2022 04:53  Phos  2.8       Mg     1.5         TPro  6.2  /  Alb  3.2<L>  /  TBili  0.4  /  DBili  x   /  AST  15  /  ALT  17  /  AlkPhos  114      PT/INR - ( 2022 16:29 )   PT: 19.9 sec;   INR: 1.72 ratio         PTT - ( 2022 02:33 )  PTT:31.7 sec      Urinalysis Basic - ( 2022 09:40 )    Color: Light Yellow / Appearance: Clear / S.029 / pH: x  Gluc: x / Ketone: Small  / Bili: Negative / Urobili: Negative   Blood: x / Protein: Trace / Nitrite: Negative   Leuk Esterase: Negative / RBC: 20 /hpf / WBC 2 /HPF   Sq Epi: x / Non Sq Epi: 1 /hpf / Bacteria: Negative          Culture - Blood (collected 2022 16:04)  Source: .Blood Blood-Peripheral  Preliminary Report (2022 01:02):    No growth to date.    Culture - Blood (collected 2022 15:50)  Source: .Blood Blood-Peripheral  Preliminary Report (2022 01:02):    No growth to date.      RADIOLOGY & ADDITIONAL TESTS:    Imaging Personally Reviewed:    Consultant(s) Notes Reviewed:      Care Discussed with Consultants/Other Providers:   Shlomo Barragan   contact via pager or TEAMS        CC: Patient is a 81y old  Male who presents with a chief complaint of Sepsis 2/2 PNA, COPD exacerbation (2022 11:58)      SUBJECTIVE / OVERNIGHT EVENTS: c/o generalized weakness but no SOB. cough improved. no f/c/r. no cp. no n/v/abd pain.     MEDICATIONS  (STANDING):  acetylcysteine 10%  Inhalation 4 milliLiter(s) Inhalation every 8 hours  albuterol/ipratropium for Nebulization 3 milliLiter(s) Nebulizer every 6 hours  atorvastatin 40 milliGRAM(s) Oral at bedtime  budesonide 160 MICROgram(s)/formoterol 4.5 MICROgram(s) Inhaler 2 Puff(s) Inhalation two times a day  cefepime   IVPB 2000 milliGRAM(s) IV Intermittent every 12 hours  citalopram 20 milliGRAM(s) Oral daily  dextrose 5%. 1000 milliLiter(s) (100 mL/Hr) IV Continuous <Continuous>  dextrose 5%. 1000 milliLiter(s) (50 mL/Hr) IV Continuous <Continuous>  dextrose 50% Injectable 25 Gram(s) IV Push once  dextrose 50% Injectable 12.5 Gram(s) IV Push once  dextrose 50% Injectable 25 Gram(s) IV Push once  glucagon  Injectable 1 milliGRAM(s) IntraMuscular once  heparin  Infusion.  Unit(s)/Hr (9.5 mL/Hr) IV Continuous <Continuous>  insulin glargine Injectable (LANTUS) 2 Unit(s) SubCutaneous every morning  insulin lispro (ADMELOG) corrective regimen sliding scale   SubCutaneous three times a day before meals  insulin lispro (ADMELOG) corrective regimen sliding scale   SubCutaneous at bedtime  metoprolol tartrate 12.5 milliGRAM(s) Oral every 12 hours  pantoprazole  Injectable 40 milliGRAM(s) IV Push every 12 hours  predniSONE   Tablet 40 milliGRAM(s) Oral daily  pregabalin 100 milliGRAM(s) Oral three times a day  sodium chloride 3%  Inhalation 4 milliLiter(s) Inhalation every 12 hours  tiotropium 18 MICROgram(s) Capsule 1 Capsule(s) Inhalation daily    MEDICATIONS  (PRN):  acetaminophen     Tablet .. 650 milliGRAM(s) Oral every 6 hours PRN Temp greater or equal to 38C (100.4F), Mild Pain (1 - 3), Moderate Pain (4 - 6)  cyclobenzaprine 5 milliGRAM(s) Oral three times a day PRN Muscle Spasm  dextrose Oral Gel 15 Gram(s) Oral once PRN Blood Glucose LESS THAN 70 milliGRAM(s)/deciliter  heparin   Injectable 4700 Unit(s) IV Push every 6 hours PRN For aPTT less than 40      Vital Signs Last 24 Hrs  T(C): 36.6 (2022 08:23), Max: 36.9 (2022 17:36)  T(F): 97.8 (2022 08:23), Max: 98.4 (2022 17:36)  HR: 98 (2022 08:23) (89 - 98)  BP: 106/66 (2022 08:23) (102/62 - 108/69)  BP(mean): --  RR: 18 (2022 08:23) (18 - 18)  SpO2: 97% (2022 08:23) (91% - 98%)  CAPILLARY BLOOD GLUCOSE      POCT Blood Glucose.: 250 mg/dL (2022 08:17)  POCT Blood Glucose.: 237 mg/dL (2022 21:17)  POCT Blood Glucose.: 200 mg/dL (2022 17:39)  POCT Blood Glucose.: 182 mg/dL (2022 13:14)    I&O's Summary    2022 07:01  -  2022 07:00  --------------------------------------------------------  IN: 0 mL / OUT: 100 mL / NET: -100 mL      tele: nsr    PHYSICAL EXAM:    GENERAL: NAD   HEENT: EOMI, PERRL  PULM: b/l exp rhonchi but no wheezes  CV: Regular rate and rhythm; nl S1, S2; No murmurs, rubs, or gallops  ABDOMEN: Soft, Nontender, Nondistended; Bowel sounds present  EXTREMITIES/MSK:  No edema, calf tenderness   PSYCH: AAOx3  NEUROLOGY: non-focal          LABS:                        9.9    9.85  )-----------( 305      ( 2022 12:13 )             32.6     06-30    134<L>  |  100  |  14  ----------------------------<  238<H>  3.9   |  22  |  0.49<L>    Ca    10.0      2022 04:53  Phos  2.8       Mg     1.5         TPro  6.2  /  Alb  3.2<L>  /  TBili  0.4  /  DBili  x   /  AST  15  /  ALT  17  /  AlkPhos  114  -30    PT/INR - ( 2022 16:29 )   PT: 19.9 sec;   INR: 1.72 ratio         PTT - ( 2022 02:33 )  PTT:31.7 sec      Urinalysis Basic - ( 2022 09:40 )    Color: Light Yellow / Appearance: Clear / S.029 / pH: x  Gluc: x / Ketone: Small  / Bili: Negative / Urobili: Negative   Blood: x / Protein: Trace / Nitrite: Negative   Leuk Esterase: Negative / RBC: 20 /hpf / WBC 2 /HPF   Sq Epi: x / Non Sq Epi: 1 /hpf / Bacteria: Negative          Culture - Blood (collected 2022 16:04)  Source: .Blood Blood-Peripheral  Preliminary Report (2022 01:02):    No growth to date.    Culture - Blood (collected 2022 15:50)  Source: .Blood Blood-Peripheral  Preliminary Report (2022 01:02):    No growth to date.      RADIOLOGY & ADDITIONAL TESTS:    Imaging Personally Reviewed:    Consultant(s) Notes Reviewed:      Care Discussed with Consultants/Other Providers:

## 2022-06-30 NOTE — SWALLOW VFSS/MBS ASSESSMENT ADULT - ASPIRATION AFTER SWALLOW - SILENT
When fluoro reinitiated between trials, trace residue observed on the vocal folds and subglottically (on anterior tracheal wall)

## 2022-06-30 NOTE — PROGRESS NOTE ADULT - SUBJECTIVE AND OBJECTIVE BOX
Follow Up:  sepsis, pneumonia    Interval History: pt afebrile, blood cx negative    ROS:      All other systems negative    Constitutional: no fever, no chills  ENT:  had sore throat and rhinorrhea, now resolved  Cardiovascular:  no chest pain, no palpitation  Respiratory:  + SOB, + cough  GI:  no abd pain, no vomiting, no diarrhea  urinary: no dysuria, no hematuria, no flank pain  musculoskeletal:  no joint pain, no joint swelling  skin:  no rash  neurology:  no headache, no seizure      Allergies  penicillins (Anaphylaxis)  sulfa drugs (Unknown)        ANTIMICROBIALS:  cefepime   IVPB 2000 every 12 hours      OTHER MEDS:  acetaminophen     Tablet .. 650 milliGRAM(s) Oral every 6 hours PRN  acetylcysteine 10%  Inhalation 4 milliLiter(s) Inhalation every 8 hours  albuterol/ipratropium for Nebulization 3 milliLiter(s) Nebulizer every 6 hours  atorvastatin 40 milliGRAM(s) Oral at bedtime  budesonide 160 MICROgram(s)/formoterol 4.5 MICROgram(s) Inhaler 2 Puff(s) Inhalation two times a day  citalopram 20 milliGRAM(s) Oral daily  cyclobenzaprine 5 milliGRAM(s) Oral three times a day PRN  dextrose 5%. 1000 milliLiter(s) IV Continuous <Continuous>  dextrose 5%. 1000 milliLiter(s) IV Continuous <Continuous>  dextrose 50% Injectable 25 Gram(s) IV Push once  dextrose 50% Injectable 12.5 Gram(s) IV Push once  dextrose 50% Injectable 25 Gram(s) IV Push once  dextrose Oral Gel 15 Gram(s) Oral once PRN  glucagon  Injectable 1 milliGRAM(s) IntraMuscular once  heparin   Injectable 4700 Unit(s) IV Push every 6 hours PRN  heparin  Infusion.  Unit(s)/Hr IV Continuous <Continuous>  insulin glargine Injectable (LANTUS) 2 Unit(s) SubCutaneous every morning  insulin lispro (ADMELOG) corrective regimen sliding scale   SubCutaneous three times a day before meals  insulin lispro (ADMELOG) corrective regimen sliding scale   SubCutaneous at bedtime  metoprolol tartrate 12.5 milliGRAM(s) Oral every 12 hours  pantoprazole  Injectable 40 milliGRAM(s) IV Push every 12 hours  predniSONE   Tablet 40 milliGRAM(s) Oral daily  pregabalin 100 milliGRAM(s) Oral three times a day  sodium chloride 3%  Inhalation 4 milliLiter(s) Inhalation every 12 hours  tiotropium 18 MICROgram(s) Capsule 1 Capsule(s) Inhalation daily      Vital Signs Last 24 Hrs  T(C): 36.6 (2022 08:23), Max: 36.9 (2022 17:36)  T(F): 97.8 (2022 08:23), Max: 98.4 (2022 17:36)  HR: 98 (2022 08:23) (89 - 98)  BP: 106/66 (2022 08:23) (102/62 - 108/69)  BP(mean): --  RR: 18 (2022 08:23) (18 - 18)  SpO2: 97% (2022 08:23) (91% - 98%)    Physical Exam:  General:    NAD,  non toxic  Head: atraumatic, normocephalic  Eye: normal sclera and conjunctiva  ENT:    no oropharyngeal lesions,   no LAD,   neck supple  Cardio:     regular S1, S2,  no murmur  Respiratory:    clear b/l,    no wheezing  abd:     soft,   BS +,   no tenderness  :   no CVAT,  no suprapubic tenderness,   no  balbuena  Musculoskeletal:   no joint swelling  vascular: no phlebitis  Skin:    no rash  Neurologic:     no focal deficit  psych: normal affect                          9.4    9.48  )-----------( 305      ( 2022 04:53 )             31.5       06-30    134<L>  |  100  |  14  ----------------------------<  238<H>  3.9   |  22  |  0.49<L>    Ca    10.0      2022 04:53  Phos  2.8     06-29  Mg     1.5     06-29    TPro  6.2  /  Alb  3.2<L>  /  TBili  0.4  /  DBili  x   /  AST  15  /  ALT  17  /  AlkPhos  114  06-30      Urinalysis Basic - ( 2022 09:40 )    Color: Light Yellow / Appearance: Clear / S.029 / pH: x  Gluc: x / Ketone: Small  / Bili: Negative / Urobili: Negative   Blood: x / Protein: Trace / Nitrite: Negative   Leuk Esterase: Negative / RBC: 20 /hpf / WBC 2 /HPF   Sq Epi: x / Non Sq Epi: 1 /hpf / Bacteria: Negative        MICROBIOLOGY:  v  .Blood Blood-Peripheral  22   No growth to date.  --  --      .Blood Blood-Peripheral  22   No growth to date.  --  --          Rapid RVP Result: Detected ( @ 16:28)        RADIOLOGY:  Images independently visualized and reviewed personally, findings as below  < from: Xray Chest 1 View- PORTABLE-Urgent (22 @ 17:11) >  IMPRESSION:  Bibasilar atelectasis and/or pleural effusion.      < end of copied text >  < from: CT Angio Chest PE Protocol w/ IV Cont (22 @ 16:57) >  IMPRESSION:    1.  No pulmonary thromboembolism to the segmental level    2.  Findings the lower lobes compatible with aspiration. Repeat chest CT   scan is recommended in 6-8 weeks following treatment to ensure resolution    3.  Status post esophagectomy    < end of copied text >

## 2022-06-30 NOTE — SWALLOW VFSS/MBS ASSESSMENT ADULT - SLP PERTINENT HISTORY OF CURRENT PROBLEM
82 y/o M with PMH of COPD, DM II, Afib, 2 MIs s/p 2 stents, esophageal cancer s/p esophagectomy, peripheral neuropathy and HTN who presents with 3 days of SOB and dry cough. Symptoms have worsened and he consulted his OP pulmonologist who prescribed him steroids earlier today. Home pulse ox today showed SpO2 of mid to low 80s which prompted him to seek transport to the hospital. Upon arrival to the ED, he was found to be hypotensive, febrile, tachypnic. Labs were significant for leukocytosis, elevated lactate and hyperkalemic. CXR showed bibasilar atelectasis with possible pleural effusions bilaterally. CT angio showed no evidence of PE and lower lobe infiltrates compatible with possible aspiration pneumonia. EKG showed no acute ischemic changes. He was given fluids, vanc and cefepime. Admitted for sepsis 2/2 to pneumonia in the setting of COPD exacerbation. NPO pending s/s eval. Pulm and ID consulted.

## 2022-06-30 NOTE — SWALLOW VFSS/MBS ASSESSMENT ADULT - RECOMMENDED FEEDING/EATING TECHNIQUES
oral care before and after meals; meds in puree; slow rate; chew completely; eliminate any environmental distractions and focus on meal/allow for swallow between intakes/alternate food with liquid/maintain upright posture during/after eating for 30 mins/small sips/bites

## 2022-06-30 NOTE — SWALLOW VFSS/MBS ASSESSMENT ADULT - ADDITIONAL INFORMATION
+calcification of the anterior notch  +lordosis of the spine  +CP bar (trace retention of material above and below throughout study)

## 2022-06-30 NOTE — PROGRESS NOTE ADULT - ASSESSMENT
80 yo M with a h/o COPD, esophageal cancer s/p esophagectomy, recurrent aspiration pneumonia in the past who presents with progressive dyspnea and cough, acute hypoxemia at home. Multifocal pneumonia, sepsis, entero/rhinovirus, likely superimposed bacterial infection    Currently on broad spectrum coverage with Vanco and Aztreonam, ID to evaluate as well  Follow up all cultures  Acapella device to aid airway clearance- reviewed technique and to use after nebulizer treatments including hypersal, Incentive spirometer  c/w Bronchodilators, Spiriva and Symbicort  Would hold off on steroids at this time, patient is not bronchospastic on exam  Supplemental O2, goal O2 sats 92-96%  Aspiration precautions, SLP evaluation  DVT ppx     Thank you for the consult  Dr. Delgado to follow up    Above was discussed with the patient and his wife at length, all questions answered. 82 yo M with a h/o COPD, esophageal cancer s/p esophagectomy, recurrent aspiration pneumonia in the past who presents with progressive dyspnea and cough, acute hypoxemia at home. Multifocal pneumonia, sepsis, entero/rhinovirus, likely superimposed bacterial infection    Currently on broad spectrum coverage s/p Vanco and Aztreonam- and now cefepime as per ID from 6/29  Follow up all cultures  Acapella device/VEST rx to aid airway clearance- reviewed technique and to use after nebulizer treatments including hypersal, Incentive spirometer  c/w Bronchodilators, Spiriva and Symbicort  Would hold off on steroids at this time, patient is not bronchospastic on exam  Supplemental O2, goal O2 sats 92-96%  Aspiration precautions, SLP evaluation  DVT ppx   **********************************                                          SEE Below:  6/30-no signif changes

## 2022-06-30 NOTE — PROGRESS NOTE ADULT - ASSESSMENT
81 m with DM II, HTN, Afib, CAD, 2 MIs s/p 2 stents, COPD with chronic cough, esophageal cancer s/p esophagectomy, p/w fever, SOB, cough, rhinorrhea and sore throat. Cough has been worse for about 2 weeks as per the patient and is dry, sometimes cough while eating and drinking  here febrile, hypotensive, tachycardic, tachypneic and hypoxic, WBC: 11  RVP: entro/rhino  chest CTA: no PE, extensive ill-defined nodularity in both lower lobes and right middle lobes s/o aspiration. Layering secretions completely opacify the bronchus intermedius and right middle and right lower lobe bronchi. Extensive mucous plugging within segmental and subsegmental bronchi in both lower lobes and right middle lobe.    fever, hypotension, tachycardia, tachypnea, hypoxic resp failure, sepsis, viral URI due to entro/rhino   aspiration pneumonia on CT (h/o esophageal marge s/p esophagectomy ) but cough has been the same for 2 weeks, only fever, hypoxia, URI symptoms are new, ?COPD exacerbation with mucous plug   anaphylaxis to penicillin but was given cefepime in ER and tolerated well    * f/u the blood cx, negative for now  * check sputum cx  * c/w cefepime 2 q 12, started 6/29, now day 2  * chest PT  * monitor WBC/diff and temp curve    The above assessment and plan was discussed with the primary team    Abi Nichols MD  contact on teams  After 5pm and on weekends call [phone]

## 2022-06-30 NOTE — PROGRESS NOTE ADULT - SUBJECTIVE AND OBJECTIVE BOX
CHIEF COMPLAINT: f/up resp failure, copd/asthma, prior aspiration pna, diaphagm dysfunction, osas-now PNA/enterorhinovirus--     Interval Events:    REVIEW OF SYSTEMS:  Constitutional: No fevers or chills. No weight loss. No fatigue or generalized malaise.  Eyes: No itching or discharge from the eyes  ENT: No ear pain. No ear discharge. No nasal congestion. No post nasal drip. No epistaxis. No throat pain. No sore throat. No difficulty swallowing.   CV: No chest pain. No palpitations. No lightheadedness or dizziness.   Resp: No dyspnea at rest. No dyspnea on exertion. No orthopnea. No wheezing. No cough. No stridor. No sputum production. No chest pain with respiration.  GI: No nausea. No vomiting. No diarrhea.  MSK: No joint pain or pain in any extremities  Integumentary: No skin lesions. No pedal edema.  Neurological: No gross motor weakness. No sensory changes.  [ ] All other systems negative  [ ] Unable to assess ROS because ________    OBJECTIVE:  ICU Vital Signs Last 24 Hrs  T(C): 36.7 (2022 04:23), Max: 36.9 (2022 17:36)  T(F): 98.1 (2022 04:23), Max: 98.4 (2022 17:36)  HR: 96 (2022 04:23) (82 - 96)  BP: 108/66 (2022 04:23) (102/62 - 120/67)  BP(mean): --  ABP: --  ABP(mean): --  RR: 18 (2022 04:23) (18 - 18)  SpO2: 92% (2022 04:23) (91% - 98%)         @ 07:01  -   @ 05:51  --------------------------------------------------------  IN: 0 mL / OUT: 100 mL / NET: -100 mL      CAPILLARY BLOOD GLUCOSE      POCT Blood Glucose.: 237 mg/dL (2022 21:17)      PHYSICAL EXAM:  General: Awake, alert, oriented X 3.   HEENT: Atraumatic, normocephalic.                 Mallampatti Grade                 No nasal congestion.                No tonsillar or pharyngeal exudates.  Lymph Nodes: No palpable lymphadenopathy  Neck: No JVD. No carotid bruit.   Respiratory: Normal chest expansion                         Normal percussion                         Normal and equal air entry                         No wheeze, rhonchi or rales.  Cardiovascular: S1 S2 normal. No murmurs, rubs or gallops.   Abdomen: Soft, non-tender, non-distended. No organomegaly. Normoactive bowel sounds.  Extremities: Warm to touch. Peripheral pulse palpable. No pedal edema.   Skin: No rashes or skin lesions  Neurological: Motor and sensory examination equal and normal in all four extremities.  Psychiatry: Appropriate mood and affect.    HOSPITAL MEDICATIONS:  MEDICATIONS  (STANDING):  albuterol/ipratropium for Nebulization 3 milliLiter(s) Nebulizer every 6 hours  atorvastatin 40 milliGRAM(s) Oral at bedtime  budesonide 160 MICROgram(s)/formoterol 4.5 MICROgram(s) Inhaler 2 Puff(s) Inhalation two times a day  cefepime   IVPB 2000 milliGRAM(s) IV Intermittent every 12 hours  citalopram 20 milliGRAM(s) Oral daily  dextrose 5%. 1000 milliLiter(s) (100 mL/Hr) IV Continuous <Continuous>  dextrose 5%. 1000 milliLiter(s) (50 mL/Hr) IV Continuous <Continuous>  dextrose 50% Injectable 25 Gram(s) IV Push once  dextrose 50% Injectable 12.5 Gram(s) IV Push once  dextrose 50% Injectable 25 Gram(s) IV Push once  glucagon  Injectable 1 milliGRAM(s) IntraMuscular once  heparin  Infusion.  Unit(s)/Hr (9.5 mL/Hr) IV Continuous <Continuous>  insulin lispro (ADMELOG) corrective regimen sliding scale   SubCutaneous three times a day before meals  insulin lispro (ADMELOG) corrective regimen sliding scale   SubCutaneous at bedtime  metoprolol tartrate 12.5 milliGRAM(s) Oral every 12 hours  pantoprazole    Tablet 40 milliGRAM(s) Oral before breakfast  predniSONE   Tablet 40 milliGRAM(s) Oral daily  pregabalin 100 milliGRAM(s) Oral three times a day  sodium chloride 3%  Inhalation 4 milliLiter(s) Inhalation every 12 hours  tiotropium 18 MICROgram(s) Capsule 1 Capsule(s) Inhalation daily    MEDICATIONS  (PRN):  acetaminophen     Tablet .. 650 milliGRAM(s) Oral every 6 hours PRN Temp greater or equal to 38C (100.4F), Mild Pain (1 - 3), Moderate Pain (4 - 6)  cyclobenzaprine 5 milliGRAM(s) Oral three times a day PRN Muscle Spasm  dextrose Oral Gel 15 Gram(s) Oral once PRN Blood Glucose LESS THAN 70 milliGRAM(s)/deciliter  heparin   Injectable 4700 Unit(s) IV Push every 6 hours PRN For aPTT less than 40      LABS:                        9.4    9.48  )-----------( 305      ( 2022 04:53 )             31.5         134<L>  |  100  |  14  ----------------------------<  238<H>  3.9   |  22  |  0.49<L>    Ca    10.0      2022 04:53  Phos  2.8       Mg     1.5         TPro  6.2  /  Alb  3.2<L>  /  TBili  0.4  /  DBili  x   /  AST  15  /  ALT  17  /  AlkPhos  114      PT/INR - ( 2022 16:29 )   PT: 19.9 sec;   INR: 1.72 ratio         PTT - ( 2022 02:33 )  PTT:31.7 sec  Urinalysis Basic - ( 2022 09:40 )    Color: Light Yellow / Appearance: Clear / S.029 / pH: x  Gluc: x / Ketone: Small  / Bili: Negative / Urobili: Negative   Blood: x / Protein: Trace / Nitrite: Negative   Leuk Esterase: Negative / RBC: 20 /hpf / WBC 2 /HPF   Sq Epi: x / Non Sq Epi: 1 /hpf / Bacteria: Negative        Venous Blood Gas:   @ 07:08  7.29/53/37/26/56.4  VBG Lactate: 2.5  Venous Blood Gas:   @ 19:00  7.28/53/25/25/26.7  VBG Lactate: 3.9  Venous Blood Gas:   @ 16:04  7.33/53/18/28/15.3  VBG Lactate: 5.1      MICROBIOLOGY:     RADIOLOGY:  [ ] Reviewed and interpreted by me    Point of Care Ultrasound Findings:    PFT:    EKG: CHIEF COMPLAINT: f/up resp failure, copd/asthma, prior aspiration pna, diaphagm dysfunction, osas-now PNA/enterorhinovirus--no signif changes-some cough and sob    Interval Events: ID consult    REVIEW OF SYSTEMS:  Constitutional: No fevers or chills. No weight loss.+ fatigue or generalized malaise.  Eyes: No itching or discharge from the eyes  ENT: No ear pain. No ear discharge. No nasal congestion. No post nasal drip. No epistaxis. No throat pain. No sore throat. No difficulty swallowing.   CV: No chest pain. No palpitations. No lightheadedness or dizziness.   Resp: No dyspnea at rest. + dyspnea on exertion. No orthopnea. No wheezing. + cough. No stridor. No sputum production. No chest pain with respiration.  GI: No nausea. No vomiting. No diarrhea.  MSK: No joint pain or pain in any extremities  Integumentary: No skin lesions. No pedal edema.  Neurological: No gross motor weakness. No sensory changes.  [ +] All other systems negative  [ ] Unable to assess ROS because ________    OBJECTIVE:  ICU Vital Signs Last 24 Hrs  T(C): 36.7 (2022 04:23), Max: 36.9 (2022 17:36)  T(F): 98.1 (2022 04:23), Max: 98.4 (2022 17:36)  HR: 96 (2022 04:23) (82 - 96)  BP: 108/66 (2022 04:23) (102/62 - 120/67)  BP(mean): --  ABP: --  ABP(mean): --  RR: 18 (2022 04:23) (18 - 18)  SpO2: 92% (2022 04:23) (91% - 98%)         @ 07:01  -  06- @ 05:51  --------------------------------------------------------  IN: 0 mL / OUT: 100 mL / NET: -100 mL      CAPILLARY BLOOD GLUCOSE      POCT Blood Glucose.: 237 mg/dL (2022 21:17)      PHYSICAL EXAM: NAD on NC O2  General: Awake, alert, oriented X 3.   HEENT: Atraumatic, normocephalic.                 Mallampatti Grade 3                No nasal congestion.                No tonsillar or pharyngeal exudates.  Lymph Nodes: No palpable lymphadenopathy  Neck: No JVD. No carotid bruit.   Respiratory: aabnormal chest expansion                         Normal percussion                         Normal and equal air entry                         No wheeze, rhonchi or rales.  Cardiovascular: S1 S2 normal. No murmurs, rubs or gallops.   Abdomen: Soft, non-tender, non-distended. No organomegaly. Normoactive bowel sounds.  Extremities: Warm to touch. Peripheral pulse palpable. No pedal edema.   Skin: No rashes or skin lesions  Neurological: Motor and sensory examination equal and normal in all four extremities.  Psychiatry: Appropriate mood and affect.    HOSPITAL MEDICATIONS:  MEDICATIONS  (STANDING):  albuterol/ipratropium for Nebulization 3 milliLiter(s) Nebulizer every 6 hours  atorvastatin 40 milliGRAM(s) Oral at bedtime  budesonide 160 MICROgram(s)/formoterol 4.5 MICROgram(s) Inhaler 2 Puff(s) Inhalation two times a day  cefepime   IVPB 2000 milliGRAM(s) IV Intermittent every 12 hours  citalopram 20 milliGRAM(s) Oral daily  dextrose 5%. 1000 milliLiter(s) (100 mL/Hr) IV Continuous <Continuous>  dextrose 5%. 1000 milliLiter(s) (50 mL/Hr) IV Continuous <Continuous>  dextrose 50% Injectable 25 Gram(s) IV Push once  dextrose 50% Injectable 12.5 Gram(s) IV Push once  dextrose 50% Injectable 25 Gram(s) IV Push once  glucagon  Injectable 1 milliGRAM(s) IntraMuscular once  heparin  Infusion.  Unit(s)/Hr (9.5 mL/Hr) IV Continuous <Continuous>  insulin lispro (ADMELOG) corrective regimen sliding scale   SubCutaneous three times a day before meals  insulin lispro (ADMELOG) corrective regimen sliding scale   SubCutaneous at bedtime  metoprolol tartrate 12.5 milliGRAM(s) Oral every 12 hours  pantoprazole    Tablet 40 milliGRAM(s) Oral before breakfast  predniSONE   Tablet 40 milliGRAM(s) Oral daily  pregabalin 100 milliGRAM(s) Oral three times a day  sodium chloride 3%  Inhalation 4 milliLiter(s) Inhalation every 12 hours  tiotropium 18 MICROgram(s) Capsule 1 Capsule(s) Inhalation daily    MEDICATIONS  (PRN):  acetaminophen     Tablet .. 650 milliGRAM(s) Oral every 6 hours PRN Temp greater or equal to 38C (100.4F), Mild Pain (1 - 3), Moderate Pain (4 - 6)  cyclobenzaprine 5 milliGRAM(s) Oral three times a day PRN Muscle Spasm  dextrose Oral Gel 15 Gram(s) Oral once PRN Blood Glucose LESS THAN 70 milliGRAM(s)/deciliter  heparin   Injectable 4700 Unit(s) IV Push every 6 hours PRN For aPTT less than 40      LABS:                        9.4    9.48  )-----------( 305      ( 2022 04:53 )             31.5     06    134<L>  |  100  |  14  ----------------------------<  238<H>  3.9   |  22  |  0.49<L>    Ca    10.0      2022 04:53  Phos  2.8       Mg     1.5         TPro  6.2  /  Alb  3.2<L>  /  TBili  0.4  /  DBili  x   /  AST  15  /  ALT  17  /  AlkPhos  114  30    PT/INR - ( 2022 16:29 )   PT: 19.9 sec;   INR: 1.72 ratio         PTT - ( 2022 02:33 )  PTT:31.7 sec  Urinalysis Basic - ( 2022 09:40 )    Color: Light Yellow / Appearance: Clear / S.029 / pH: x  Gluc: x / Ketone: Small  / Bili: Negative / Urobili: Negative   Blood: x / Protein: Trace / Nitrite: Negative   Leuk Esterase: Negative / RBC: 20 /hpf / WBC 2 /HPF   Sq Epi: x / Non Sq Epi: 1 /hpf / Bacteria: Negative        Venous Blood Gas:   @ 07:08  7.29/53/37/26/56.4  VBG Lactate: 2.5  Venous Blood Gas:   @ 19:00  7.28/53/25/25/26.7  VBG Lactate: 3.9  Venous Blood Gas:   @ 16:04  7.33/53/18/28/15.3  VBG Lactate: 5.1      MICROBIOLOGY:     RADIOLOGY: r< from: CT Angio Chest PE Protocol w/ IV Cont (22 @ 16:57) >  Sagittal and coronal reformats were performed as well as 3D (MIP)   reconstructions.    FINDINGS:    CTA: The contrast bolus is satisfactory. There is no filling defect in   the pulmonary artery or its branches to the segmental level. The heart is   normal in size. No pericardial effusion. There are three-vessel coronary   artery calcifications. The mid ascending aorta measures 3.7 cm.    LUNGS AND AIRWAYS: There is extensive ill-defined nodularity in both   lower lobes and right middle lobes. Layering secretions completely   opacify the bronchus intermedius and right middle and right lower lobe   bronchi. Extensivemucous plugging within segmental and subsegmental   bronchi in both lower lobes and right middle lobe.    PLEURA: No pleural effusion.    MEDIASTINUM AND PEE: No lymphadenopathy. Status post esophagectomy with   gastric pull-through. There is a hiatal hernia.    VISUALIZED UPPER ABDOMEN: Cholecystectomy.    BONES: There is diffuse qualitative osteopenia and multilevel discogenic   disease in the spine. Compression fracture of T11 vertebral body, age   indeterminate although new since 2018.    IMPRESSION:    1.  No pulmonary thromboembolism to the segmental level    2.  Findings the lower lobes compatible with aspiration. Repeat chest CT   scan is recommended in 6-8 weeks following treatment to ensure resolution    3.  Status post esophagectomy      < end of copied text >    [ ] Reviewed and interpreted by me    Point of Care Ultrasound Findings:    PFT:    EKG:

## 2022-06-30 NOTE — SWALLOW VFSS/MBS ASSESSMENT ADULT - LARYNGEAL PENETRATION DURING THE SWALLOW - SILENT
Single instance of trace penetration from the laryngeal surface of the epiglottis with complete retrieval

## 2022-06-30 NOTE — SWALLOW VFSS/MBS ASSESSMENT ADULT - SLP GENERAL OBSERVATIONS
Pt was received in radiology suite in [facility] chair. +NC (2L). He was AAOx4, pleasant, and cooperative. Able to follow all complex commands and answer all questions for purposes of evaluation. Good historian.

## 2022-06-30 NOTE — PROGRESS NOTE ADULT - TIME BILLING
as above:  multifactorial dyspnea-resp failure, copd/asthma, prior aspiration pna, diaphagm dysfunction, osas-now PNA/enterorhinovirus--O2 sat above 90% as above:  multifactorial dyspnea-resp failure, copd/asthma, prior aspiration pna, diaphagm dysfunction, osas-now PNA/enterorhinovirus--O2 sat above 90%  PNA-aspiration--Sp and sw evaln, cefepime 6/29-duration as per ID, check all cx, f/up CXR few days  copd/mucus plugging--acapella/chest vest rx, duoneb q 6, hypersal q 6, mucomyst 2 cc 10% q 8, symbicort/spiriva; hold steroids (?on pred 40)  GI-aspiration --sp and sw evaln, precautions                        OSAS-refused rx prior  DVT prophylaxis  PT evaln--OOB                           BS control    CV-mult rx    Renan Delgado MD-Pulmonary   [phone]

## 2022-06-30 NOTE — SWALLOW VFSS/MBS ASSESSMENT ADULT - ADDITIONAL RECOMMENDATIONS
GOALS:  1. Pt/family/caregiver will demonstrate understanding and carryover of safe swallow guidelines.  2. Pt will complete dysphagia exercises to improve swallow function.  3. Pt will tolerate recommended diet with no overt, clinical s/s of aspiration.

## 2022-06-30 NOTE — SWALLOW VFSS/MBS ASSESSMENT ADULT - RECOMMENDED CONSISTENCY
Regular diet and Thin liquids    Pt remains at risk for aspiration and would benefit from implementation of the safe swallowing strategies below.

## 2022-06-30 NOTE — SWALLOW VFSS/MBS ASSESSMENT ADULT - ORAL PHASE
Piecemeal deglutition (x3) Mild oral residue which pt clears with spontaneous secondary swallow within functional limits

## 2022-06-30 NOTE — CONSULT NOTE ADULT - ATTENDING COMMENTS
Agree with above. Patient admitted with aspiration pneumonia and dyspnea. H/H is stable on repeat today on serial CBCs, and he has no signs of bleeding. No plans for endoscopic evaluation at this time given pneumonia/dyspnea/increased risks and no overt bleeding. No absolute contraindication to continue anticoagulation. Would continue PPI daily and monitor H/H. Discussed with wife at bedside.

## 2022-06-30 NOTE — SWALLOW VFSS/MBS ASSESSMENT ADULT - COMMENTS
IMAGING:  CXR: 6/28/22  IMPRESSION:  Bibasilar atelectasis and/or pleural effusion.    CT ANGIO CHEST: 6/28/22  IMPRESSION:  1.  No pulmonary thromboembolism to the segmental level.   2.  Findings the lower lobes compatible with aspiration.   3.  Status post esophagectomy.

## 2022-06-30 NOTE — PROGRESS NOTE ADULT - ASSESSMENT
82 yo M PMH of COPD, DM II, Afib, CAD s/p 3 stents, esophageal cancer s/p esophagectomy, peripheral neuropathy and HTN who presents with 3 days of SOB and dry cough. He met SIRS criteria (hypotensive, febrile, tachypnic and leukocytotic) on presentation and was admitted for sepsis 2/2 to pneumonia in the setting of COPD exacerbation.

## 2022-06-30 NOTE — CONSULT NOTE ADULT - SUBJECTIVE AND OBJECTIVE BOX
HPI:  82 yo M PMH of COPD, DM II, Afib, 2 MIs s/p 2 stents, esophageal cancer s/p esophagectomy, peripheral neuropathy and HTN who presents with 3 days of SOB and dry cough. Home pulse oximetry today showed SpO2 of mid to low 80s which prompted him to seek transport to the hospital. Upon arrival to the ED, he was found to be hypotensive, febrile, tachypnic. Labs were significant for leukocytosis, elevated lactate and hyperkalemic. CXR showed bibasilar atelectasis with possible pleural effusions bilaterally. CT angio showed no evidence of PE and lower lobe infiltrates compatible with possible aspiration pneumonia. Patient with ongoing anemia, GI called for further recs.    Patient reports doing better. With no overt bleeding, having daily light brown BM. No abdominal pain, N/V, or dysphagia. Does have some poor appetite.       Allergies:  penicillins (Anaphylaxis)  sulfa drugs (Unknown)        Hospital Medications:  acetaminophen     Tablet .. 650 milliGRAM(s) Oral every 6 hours PRN  acetylcysteine 10%  Inhalation 4 milliLiter(s) Inhalation every 8 hours  albuterol/ipratropium for Nebulization 3 milliLiter(s) Nebulizer every 6 hours  atorvastatin 40 milliGRAM(s) Oral at bedtime  budesonide 160 MICROgram(s)/formoterol 4.5 MICROgram(s) Inhaler 2 Puff(s) Inhalation two times a day  cefepime   IVPB 2000 milliGRAM(s) IV Intermittent every 12 hours  citalopram 20 milliGRAM(s) Oral daily  cyclobenzaprine 5 milliGRAM(s) Oral three times a day PRN  dextrose 5%. 1000 milliLiter(s) IV Continuous <Continuous>  dextrose 5%. 1000 milliLiter(s) IV Continuous <Continuous>  dextrose 50% Injectable 25 Gram(s) IV Push once  dextrose 50% Injectable 12.5 Gram(s) IV Push once  dextrose 50% Injectable 25 Gram(s) IV Push once  dextrose Oral Gel 15 Gram(s) Oral once PRN  glucagon  Injectable 1 milliGRAM(s) IntraMuscular once  heparin   Injectable 4700 Unit(s) IV Push every 6 hours PRN  heparin  Infusion.  Unit(s)/Hr IV Continuous <Continuous>  insulin glargine Injectable (LANTUS) 2 Unit(s) SubCutaneous every morning  insulin lispro (ADMELOG) corrective regimen sliding scale   SubCutaneous three times a day before meals  insulin lispro (ADMELOG) corrective regimen sliding scale   SubCutaneous at bedtime  metoprolol tartrate 12.5 milliGRAM(s) Oral every 12 hours  pantoprazole  Injectable 40 milliGRAM(s) IV Push every 12 hours  predniSONE   Tablet 40 milliGRAM(s) Oral daily  pregabalin 100 milliGRAM(s) Oral three times a day  sodium chloride 3%  Inhalation 4 milliLiter(s) Inhalation every 12 hours  tiotropium 18 MICROgram(s) Capsule 1 Capsule(s) Inhalation daily      PMHX/PSHX:  Carotid Artery Disease    Myocardial infarction    Paroxysmal a-fib    Diabetes mellitus    Obstructive sleep apnea    Esophageal cancer    Pneumonia    Hypertension    COPD exacerbation    DVT, lower extremity    Esophageal cancer    History of cholecystectomy    History of cataract surgery    Elbow joint symptom        Family history:  FH: breast cancer (Mother)    FH: cardiovascular disease (Father)    FH: diabetes mellitus (Mother, Father)        Social History: no smoking    ROS:   General:  No fevers, chills or night sweats  ENT:  No sore throat or dysphagia  CV:  No pain or palpitations  Resp:  No dyspnea, cough or  wheezing  GI:  as above  Skin:  No rash or edema  Neuro: no weakness   Hematologic: no bleeding  Musculoskeletal: no muscle pain or join pain  Psych: no agitation     : no dysuria      PHYSICAL EXAM:   GENERAL:  NAD, Appears stated age  HEENT:  NC/AT,  conjunctivae clear and pink, sclera -anicteric  CHEST:  CTA B/L, mild increase work of breathing   HEART:  RRR S1/S2,  ABDOMEN:  Soft, non-tender, non-distended,  no masses   EXTREMITIES:  No cyanosis or Edema  SKIN:  Warm & Dry. No rash or erythema  NEURO:  Alert, oriented, no focal deficit    Vital Signs:  Vital Signs Last 24 Hrs  T(C): 36.7 (2022 12:25), Max: 36.9 (2022 17:36)  T(F): 98.1 (2022 12:25), Max: 98.4 (2022 17:36)  HR: 89 (2022 12:25) (89 - 98)  BP: 117/72 (2022 12:25) (102/62 - 117/72)  BP(mean): --  RR: 18 (2022 12:25) (18 - 18)  SpO2: 96% (2022 12:25) (91% - 98%)  Daily     Daily     LABS:                        9.9    9.85  )-----------( 305      ( 2022 12:13 )             32.6     Mean Cell Volume: 78.4 fl (22 @ 12:13)        134<L>  |  100  |  14  ----------------------------<  238<H>  3.9   |  22  |  0.49<L>    Ca    10.0      2022 04:53  Phos  2.8       Mg     1.5         TPro  6.2  /  Alb  3.2<L>  /  TBili  0.4  /  DBili  x   /  AST  15  /  ALT  17  /  AlkPhos  114  30    LIVER FUNCTIONS - ( 2022 04:53 )  Alb: 3.2 g/dL / Pro: 6.2 g/dL / ALK PHOS: 114 U/L / ALT: 17 U/L / AST: 15 U/L / GGT: x           PT/INR - ( 2022 16:29 )   PT: 19.9 sec;   INR: 1.72 ratio         PTT - ( 2022 13:03 )  PTT:39.2 sec  Urinalysis Basic - ( 2022 09:40 )    Color: Light Yellow / Appearance: Clear / S.029 / pH: x  Gluc: x / Ketone: Small  / Bili: Negative / Urobili: Negative   Blood: x / Protein: Trace / Nitrite: Negative   Leuk Esterase: Negative / RBC: 20 /hpf / WBC 2 /HPF   Sq Epi: x / Non Sq Epi: 1 /hpf / Bacteria: Negative                              9.9    9.85  )-----------( 305      ( 2022 12:13 )             32.6                         9.4    9.48  )-----------( 305      ( 2022 04:53 )             31.5                         9.4    9.45  )-----------( 301      ( 2022 01:18 )             30.7                         9.9    9.00  )-----------( 245      ( 2022 16:40 )             33.0                         10.0   7.95  )-----------( 255       2022 07:12 )             33.0     Imaging:  Reviewed  HPI:  82 yo M PMH of COPD, DM II, Afib, 2 MIs s/p 2 stents, esophageal cancer s/p esophagectomy, peripheral neuropathy and HTN who presents with 3 days of SOB and dry cough. Home pulse oximetry today showed SpO2 of mid to low 80s which prompted him to seek transport to the hospital. Upon arrival to the ED, he was found to be hypotensive, febrile, tachypnic. Labs were significant for leukocytosis, elevated lactate and hyperkalemic. CXR showed bibasilar atelectasis with possible pleural effusions bilaterally. CT angio showed no evidence of PE and lower lobe infiltrates compatible with possible aspiration pneumonia. Patient with ongoing anemia, GI called for further recs.    Patient reports doing better. With no overt bleeding, having daily light brown BM. No abdominal pain, N/V, or dysphagia. Does have some poor appetite.       Allergies:  penicillins (Anaphylaxis)  sulfa drugs (Unknown)        Hospital Medications:  acetaminophen     Tablet .. 650 milliGRAM(s) Oral every 6 hours PRN  acetylcysteine 10%  Inhalation 4 milliLiter(s) Inhalation every 8 hours  albuterol/ipratropium for Nebulization 3 milliLiter(s) Nebulizer every 6 hours  atorvastatin 40 milliGRAM(s) Oral at bedtime  budesonide 160 MICROgram(s)/formoterol 4.5 MICROgram(s) Inhaler 2 Puff(s) Inhalation two times a day  cefepime   IVPB 2000 milliGRAM(s) IV Intermittent every 12 hours  citalopram 20 milliGRAM(s) Oral daily  cyclobenzaprine 5 milliGRAM(s) Oral three times a day PRN  dextrose 5%. 1000 milliLiter(s) IV Continuous <Continuous>  dextrose 5%. 1000 milliLiter(s) IV Continuous <Continuous>  dextrose 50% Injectable 25 Gram(s) IV Push once  dextrose 50% Injectable 12.5 Gram(s) IV Push once  dextrose 50% Injectable 25 Gram(s) IV Push once  dextrose Oral Gel 15 Gram(s) Oral once PRN  glucagon  Injectable 1 milliGRAM(s) IntraMuscular once  heparin   Injectable 4700 Unit(s) IV Push every 6 hours PRN  heparin  Infusion.  Unit(s)/Hr IV Continuous <Continuous>  insulin glargine Injectable (LANTUS) 2 Unit(s) SubCutaneous every morning  insulin lispro (ADMELOG) corrective regimen sliding scale   SubCutaneous three times a day before meals  insulin lispro (ADMELOG) corrective regimen sliding scale   SubCutaneous at bedtime  metoprolol tartrate 12.5 milliGRAM(s) Oral every 12 hours  pantoprazole  Injectable 40 milliGRAM(s) IV Push every 12 hours  predniSONE   Tablet 40 milliGRAM(s) Oral daily  pregabalin 100 milliGRAM(s) Oral three times a day  sodium chloride 3%  Inhalation 4 milliLiter(s) Inhalation every 12 hours  tiotropium 18 MICROgram(s) Capsule 1 Capsule(s) Inhalation daily      PMHX/PSHX:  Carotid Artery Disease    Myocardial infarction    Paroxysmal a-fib    Diabetes mellitus    Obstructive sleep apnea    Esophageal cancer    Pneumonia    Hypertension    COPD exacerbation    DVT, lower extremity    Esophageal cancer    History of cholecystectomy    History of cataract surgery    Elbow joint symptom        Family history:  FH: breast cancer (Mother)    FH: cardiovascular disease (Father)    FH: diabetes mellitus (Mother, Father)        Social History: no smoking or illicit drugs    ROS:   General:  No fevers, chills or night sweats  ENT:  No sore throat or dysphagia  CV:  No pain or palpitations  Resp:  No dyspnea, cough or  wheezing  GI:  as above  Skin:  No rash or edema  Neuro: no weakness   Hematologic: no bleeding  Musculoskeletal: no muscle pain or join pain  Psych: no agitation     : no dysuria      PHYSICAL EXAM:   GENERAL:  NAD, Appears stated age  HEENT:  NC/AT,  conjunctivae clear and pink, sclera -anicteric  CHEST:  CTA B/L, mild increase work of breathing   HEART:  RRR S1/S2,  ABDOMEN:  Soft, non-tender, non-distended,  no masses   EXTREMITIES:  No cyanosis or Edema  SKIN:  Warm & Dry. No rash or erythema  NEURO:  Alert, oriented, no focal deficit  PSYCH: Normal affect    Vital Signs:  Vital Signs Last 24 Hrs  T(C): 36.7 (2022 12:25), Max: 36.9 (2022 17:36)  T(F): 98.1 (2022 12:25), Max: 98.4 (2022 17:36)  HR: 89 (2022 12:25) (89 - 98)  BP: 117/72 (2022 12:25) (102/62 - 117/72)  BP(mean): --  RR: 18 (2022 12:25) (18 - 18)  SpO2: 96% (2022 12:25) (91% - 98%)  Daily     Daily     LABS:                        9.9    9.85  )-----------( 305      ( 2022 12:13 )             32.6     Mean Cell Volume: 78.4 fl (- @ 12:13)        134<L>  |  100  |  14  ----------------------------<  238<H>  3.9   |  22  |  0.49<L>    Ca    10.0      2022 04:53  Phos  2.8       Mg     1.5     29    TPro  6.2  /  Alb  3.2<L>  /  TBili  0.4  /  DBili  x   /  AST  15  /  ALT  17  /  AlkPhos  114  30    LIVER FUNCTIONS - ( 2022 04:53 )  Alb: 3.2 g/dL / Pro: 6.2 g/dL / ALK PHOS: 114 U/L / ALT: 17 U/L / AST: 15 U/L / GGT: x           PT/INR - ( 2022 16:29 )   PT: 19.9 sec;   INR: 1.72 ratio         PTT - ( 2022 13:03 )  PTT:39.2 sec  Urinalysis Basic - ( 2022 09:40 )    Color: Light Yellow / Appearance: Clear / S.029 / pH: x  Gluc: x / Ketone: Small  / Bili: Negative / Urobili: Negative   Blood: x / Protein: Trace / Nitrite: Negative   Leuk Esterase: Negative / RBC: 20 /hpf / WBC 2 /HPF   Sq Epi: x / Non Sq Epi: 1 /hpf / Bacteria: Negative                              9.9    9.85  )-----------( 305      ( 2022 12:13 )             32.6                         9.4    9.48  )-----------( 305      ( 2022 04:53 )             31.5                         9.4    9.45  )-----------( 301      ( 2022 01:18 )             30.7                         9.9    9.00  )-----------( 245      ( 2022 16:40 )             33.0                         10.0   7.95  )-----------( 255      ( 2022 07:12 )             33.0     Imaging:  Reviewed

## 2022-06-30 NOTE — CONSULT NOTE ADULT - ASSESSMENT
#Microcytic anemia   #Aspiration PNA  #Enterovirus   Patient with no overt GI bleeding. Patient with low iron levels which could be in setting from LESLY vs anemia or chronic disease. Patient follows with Dr. Fournier at [facility]. Reported that on his last EGD in 2017 patient infarcted and aspirated. He and the patient are all onboard to monitor, especially in setting of recent viral illness. Hb stable, 10-11.     Recommendations:  -PPI BID  -No CI to restart Aspirin or AC  -Would give IV iron (iron sucrose 200 mg) x 3 days while inpatient  -Further outpatient work up per Dr. Fournier    No further work up from our perspective. Please reach out to us for any question/concern or change in clinical status     Recommendations preliminary until signed by attending.     Kendrick Galarza MD  Gastroenterology/Hepatology Fellow  1st option: [phone] (text or call), ONLY available from 7:00 am to 5:00 pm.   **Contact on-call GI fellow via answering service ([phone]) from 5pm-7am AND on weekends/holidays**  2nd option: Available via Microsoft Teams  3rd option: Pager: [phone]         #Microcytic anemia, stable near baseline, no signs of overt bleeding  #Aspiration PNA  #Enterovirus   Patient with no overt GI bleeding. Patient with low iron levels which could be in setting from LESLY vs anemia or chronic disease. Patient follows with Dr. Fournier at [facility]. Reported that on his last EGD in 2017 patient infarcted and aspirated. He and the patient are all onboard to monitor, especially in setting of recent viral illness/aspiration pneumonia. Hb stable, 10-11.     Recommendations:  -PPI BID  -No CI to restart Aspirin or AC  -Would give IV iron (iron sucrose 200 mg) x 3 days while inpatient  -Further outpatient work up per Dr. Fournier    No further work up from our perspective. Please reach out to us for any question/concern or change in clinical status     Recommendations preliminary until signed by attending.     Kendrick Galarza MD  Gastroenterology/Hepatology Fellow  1st option: [phone] (text or call), ONLY available from 7:00 am to 5:00 pm.   **Contact on-call GI fellow via answering service ([phone]) from 5pm-7am AND on weekends/holidays**  2nd option: Available via Microsoft Teams  3rd option: Pager: [phone]

## 2022-06-30 NOTE — PROGRESS NOTE ADULT - PROBLEM SELECTOR PLAN 7
+FOBT. brown stool. Hgb trending down as an outpt. apixaban changed to Hep gtt to see if H/H drops more. House GI called to see if pt can cont AC as an outpt. Outpt GI is Victor Manuel Fournier but doesn't consult here,

## 2022-07-01 NOTE — PATIENT PROFILE ADULT - FALL HARM RISK - HARM RISK INTERVENTIONS
Assistance with ambulation/Assistance OOB with selected safe patient handling equipment/Communicate Risk of Fall with Harm to all staff/Discuss with provider need for PT consult/Monitor gait and stability/Reinforce activity limits and safety measures with patient and family/Tailored Fall Risk Interventions/Visual Cue: Yellow wristband and red socks/Bed in lowest position, wheels locked, appropriate side rails in place/Call bell, personal items and telephone in reach/Instruct patient to call for assistance before getting out of bed or chair/Non-slip footwear when patient is out of bed/[place] to call system/Physically safe environment - no spills, clutter or unnecessary equipment/Purposeful Proactive Rounding/Room/bathroom lighting operational, light cord in reach

## 2022-07-01 NOTE — PROGRESS NOTE ADULT - PROBLEM SELECTOR PLAN 1
S/p 1 dose IV vanco in ED and started on cefepime. CT angio showed concerns for aspiration pneumonia. +enterovirus  -cefepime. Holding off on Quinolones as pt has dilated thoracic aorta.  -f/u blood cultures-NGTD  -legionella neg  - ID consult apprec

## 2022-07-01 NOTE — PROGRESS NOTE ADULT - ASSESSMENT
80 yo M with a h/o COPD, esophageal cancer s/p esophagectomy, recurrent aspiration pneumonia in the past who presents with progressive dyspnea and cough, acute hypoxemia at home. Multifocal pneumonia, sepsis, entero/rhinovirus, likely superimposed bacterial infection    Currently on broad spectrum coverage s/p Vanco and Aztreonam- and now cefepime as per ID from 6/29  Follow up all cultures  Acapella device/VEST rx to aid airway clearance- reviewed technique and to use after nebulizer treatments including hypersal, Incentive spirometer  c/w Bronchodilators, Spiriva and Symbicort  Would hold off on steroids at this time, patient is not bronchospastic on exam  Supplemental O2, goal O2 sats 92-96%  Aspiration precautions, SLP evaluation  DVT ppx   **********************************                                          SEE Below:  6/30-no signif changes  7/1-D3 cefepime (ID)

## 2022-07-01 NOTE — PROGRESS NOTE ADULT - TIME BILLING
as above:  multifactorial dyspnea-resp failure, copd/asthma, prior aspiration pna, diaphagm dysfunction, osas-now PNA/enterorhinovirus--O2 sat above 90%  PNA-aspiration--Sp and sw evaln, cefepime 6/29-D3 duration as per ID, check all cx, f/up CXR few days  copd/mucus plugging--acapella/chest vest rx, duoneb q 6, hypersal q 6, mucomyst 2 cc 10% q 8, symbicort/spiriva; hold steroids (?on pred 40)  GI-aspiration --sp and sw evaln, precautions                        OSAS-refused rx prior  DVT prophylaxis  PT evaln--OOB                           BS control    CV-mult rx-heparin IV    Renan Delgado MD-Pulmonary   [phone]

## 2022-07-01 NOTE — PROGRESS NOTE ADULT - SUBJECTIVE AND OBJECTIVE BOX
CHIEF COMPLAINT: f/up resp failure, copd/asthma, prior aspiration pna, diaphagm dysfunction, osas-now PNA/enterorhinovirus-feels OK-no new sob or cough    Interval Events: vest rx, IV heparin; D3 cefepime    REVIEW OF SYSTEMS:  Constitutional: No fevers or chills. No weight loss. No fatigue or generalized malaise.  Eyes: No itching or discharge from the eyes  ENT: No ear pain. No ear discharge. No nasal congestion. No post nasal drip. No epistaxis. No throat pain. No sore throat. No difficulty swallowing.   CV: No chest pain. No palpitations. No lightheadedness or dizziness.   Resp: No dyspnea at rest. No dyspnea on exertion. No orthopnea. No wheezing. No cough. No stridor. No sputum production. No chest pain with respiration.  GI: No nausea. No vomiting. No diarrhea.  MSK: No joint pain or pain in any extremities  Integumentary: No skin lesions. No pedal edema.  Neurological: No gross motor weakness. No sensory changes.  [ ] All other systems negative  [ ] Unable to assess ROS because ________    OBJECTIVE:  ICU Vital Signs Last 24 Hrs  T(C): 36.7 (2022 04:04), Max: 37.1 (2022 18:52)  T(F): 98 (2022 04:04), Max: 98.8 (2022 18:52)  HR: 89 (2022 04:04) (89 - 98)  BP: 116/63 (2022 04:04) (106/66 - 126/75)  BP(mean): --  ABP: --  ABP(mean): --  RR: 18 (2022 04:04) (18 - 18)  SpO2: 97% (2022 04:04) (95% - 97%)         @ 07:01  -   @ 07:00  --------------------------------------------------------  IN: 0 mL / OUT: 100 mL / NET: -100 mL     @ 07:01  -   @ 05:22  --------------------------------------------------------  IN: 120 mL / OUT: 200 mL / NET: -80 mL      CAPILLARY BLOOD GLUCOSE      POCT Blood Glucose.: 235 mg/dL (2022 21:27)      PHYSICAL EXAM: NAD in bed on NC O2  General: Awake, alert, oriented X 3.   HEENT: Atraumatic, normocephalic.                 Mallampatti Grade 3                No nasal congestion.                No tonsillar or pharyngeal exudates.  Lymph Nodes: No palpable lymphadenopathy  Neck: No JVD. No carotid bruit.   Respiratory: abnormal chest expansion                         Normal percussion                         Normal and equal air entry                         No wheeze, rhonchi or rales.  Cardiovascular: S1 S2 normal. No murmurs, rubs or gallops.   Abdomen: Soft, non-tender, non-distended. No organomegaly. Normoactive bowel sounds.  Extremities: Warm to touch. Peripheral pulse palpable. No pedal edema.   Skin: No rashes or skin lesions  Neurological: Motor and sensory examination equal and normal in all four extremities.  Psychiatry: Appropriate mood and affect.    HOSPITAL MEDICATIONS:  MEDICATIONS  (STANDING):  acetylcysteine 10%  Inhalation 4 milliLiter(s) Inhalation every 8 hours  albuterol/ipratropium for Nebulization 3 milliLiter(s) Nebulizer every 6 hours  atorvastatin 40 milliGRAM(s) Oral at bedtime  budesonide 160 MICROgram(s)/formoterol 4.5 MICROgram(s) Inhaler 2 Puff(s) Inhalation two times a day  cefepime   IVPB 2000 milliGRAM(s) IV Intermittent every 12 hours  citalopram 20 milliGRAM(s) Oral daily  dextrose 5%. 1000 milliLiter(s) (100 mL/Hr) IV Continuous <Continuous>  dextrose 5%. 1000 milliLiter(s) (50 mL/Hr) IV Continuous <Continuous>  dextrose 50% Injectable 25 Gram(s) IV Push once  dextrose 50% Injectable 12.5 Gram(s) IV Push once  dextrose 50% Injectable 25 Gram(s) IV Push once  glucagon  Injectable 1 milliGRAM(s) IntraMuscular once  heparin  Infusion.  Unit(s)/Hr (9.5 mL/Hr) IV Continuous <Continuous>  insulin glargine Injectable (LANTUS) 2 Unit(s) SubCutaneous every morning  insulin lispro (ADMELOG) corrective regimen sliding scale   SubCutaneous three times a day before meals  insulin lispro (ADMELOG) corrective regimen sliding scale   SubCutaneous at bedtime  metoprolol tartrate 12.5 milliGRAM(s) Oral every 12 hours  pantoprazole  Injectable 40 milliGRAM(s) IV Push every 12 hours  predniSONE   Tablet 40 milliGRAM(s) Oral daily  pregabalin 100 milliGRAM(s) Oral three times a day  sodium chloride 3%  Inhalation 4 milliLiter(s) Inhalation every 12 hours  tiotropium 18 MICROgram(s) Capsule 1 Capsule(s) Inhalation daily    MEDICATIONS  (PRN):  acetaminophen     Tablet .. 650 milliGRAM(s) Oral every 6 hours PRN Temp greater or equal to 38C (100.4F), Mild Pain (1 - 3), Moderate Pain (4 - 6)  cyclobenzaprine 5 milliGRAM(s) Oral three times a day PRN Muscle Spasm  dextrose Oral Gel 15 Gram(s) Oral once PRN Blood Glucose LESS THAN 70 milliGRAM(s)/deciliter  heparin   Injectable 4700 Unit(s) IV Push every 6 hours PRN For aPTT less than 40      LABS:                        9.1    8.56  )-----------( 295      ( 2022 04:41 )             29.2     06-30    134<L>  |  100  |  14  ----------------------------<  238<H>  3.9   |  22  |  0.49<L>    Ca    10.0      2022 04:53  Phos  2.8     06-29  Mg     1.5     06-29    TPro  6.2  /  Alb  3.2<L>  /  TBili  0.4  /  DBili  x   /  AST  15  /  ALT  17  /  AlkPhos  114  06-30    PTT - ( 2022 04:41 )  PTT:52.4 sec  Urinalysis Basic - ( 2022 09:40 )    Color: Light Yellow / Appearance: Clear / S.029 / pH: x  Gluc: x / Ketone: Small  / Bili: Negative / Urobili: Negative   Blood: x / Protein: Trace / Nitrite: Negative   Leuk Esterase: Negative / RBC: 20 /hpf / WBC 2 /HPF   Sq Epi: x / Non Sq Epi: 1 /hpf / Bacteria: Negative        Venous Blood Gas:   @ 07:08  7.29/53/37/26/56.4  VBG Lactate: 2.5      MICROBIOLOGY:     RADIOLOGY:  [ ] Reviewed and interpreted by me    Point of Care Ultrasound Findings:    PFT:    EKG:

## 2022-07-01 NOTE — PROGRESS NOTE ADULT - PROBLEM SELECTOR PLAN 7
+FOBT. brown stool. Hgb trending down as an outpt. apixaban changed to Hep gtt to see if H/H drops more. House GI called to see if pt can cont AC as an outpt. Outpt GI is [facility] Fournier but doesn't consult here  Will give IV iron, no plans for EGD/colonoscopy

## 2022-07-01 NOTE — PROGRESS NOTE ADULT - SUBJECTIVE AND OBJECTIVE BOX
[facility] Division of Hospital Medicine  Shlomo Huanelviraregino   Pager (ANIVAL, 7O-9J): [phone]  Other Times:  [phone]    Patient is a 81y old  Male who presents with a chief complaint of Sepsis 2/2 PNA, COPD exacerbation (01 Jul 2022 14:09)    SUBJECTIVE / OVERNIGHT EVENTS: Overnight, patient converted from SR to Afib. No other acute events. Patient seen and examined at bedside today, endorses cough but denies shortness of breath or chest pain.    REVIEW OF SYSTEMS:    CONSTITUTIONAL: Endorses generalized weakness, denies fevers or chills  EYES/ENT: No visual changes;  No vertigo or throat pain   NECK: No pain or stiffness  RESPIRATORY: Endorses cough; No shortness of breath  CARDIOVASCULAR: No chest pain or palpitations  GASTROINTESTINAL: No abdominal or epigastric pain. No nausea, vomiting, or hematemesis; No diarrhea or constipation. No melena or hematochezia.  GENITOURINARY: No dysuria, frequency or hematuria  NEUROLOGICAL: No numbness or tingling  SKIN: No itching, burning, rashes, or lesions  MSK: No joint pain, no back pain  HEME: No easy bleeding, no easy bruising  All other review of systems is negative unless indicated above.    MEDICATIONS  (STANDING):  acetylcysteine 10%  Inhalation 4 milliLiter(s) Inhalation every 8 hours  albuterol/ipratropium for Nebulization 3 milliLiter(s) Nebulizer every 6 hours  atorvastatin 40 milliGRAM(s) Oral at bedtime  budesonide 160 MICROgram(s)/formoterol 4.5 MICROgram(s) Inhaler 2 Puff(s) Inhalation two times a day  cefepime   IVPB 2000 milliGRAM(s) IV Intermittent every 12 hours  chlorhexidine 2% Cloths 1 Application(s) Topical daily  citalopram 20 milliGRAM(s) Oral daily  dextrose 5%. 1000 milliLiter(s) (50 mL/Hr) IV Continuous <Continuous>  dextrose 5%. 1000 milliLiter(s) (100 mL/Hr) IV Continuous <Continuous>  dextrose 50% Injectable 25 Gram(s) IV Push once  dextrose 50% Injectable 12.5 Gram(s) IV Push once  dextrose 50% Injectable 25 Gram(s) IV Push once  glucagon  Injectable 1 milliGRAM(s) IntraMuscular once  guaiFENesin ER 1200 milliGRAM(s) Oral every 12 hours  heparin  Infusion.  Unit(s)/Hr (9.5 mL/Hr) IV Continuous <Continuous>  insulin glargine Injectable (LANTUS) 2 Unit(s) SubCutaneous every morning  insulin lispro (ADMELOG) corrective regimen sliding scale   SubCutaneous three times a day before meals  insulin lispro (ADMELOG) corrective regimen sliding scale   SubCutaneous at bedtime  iron sucrose Injectable 200 milliGRAM(s) IV Push every 24 hours  metoprolol tartrate 12.5 milliGRAM(s) Oral every 12 hours  pantoprazole  Injectable 40 milliGRAM(s) IV Push every 12 hours  predniSONE   Tablet 40 milliGRAM(s) Oral daily  pregabalin 100 milliGRAM(s) Oral three times a day  sodium chloride 3%  Inhalation 4 milliLiter(s) Inhalation every 12 hours  tiotropium 18 MICROgram(s) Capsule 1 Capsule(s) Inhalation daily    MEDICATIONS  (PRN):  acetaminophen     Tablet .. 650 milliGRAM(s) Oral every 6 hours PRN Temp greater or equal to 38C (100.4F), Mild Pain (1 - 3), Moderate Pain (4 - 6)  cyclobenzaprine 5 milliGRAM(s) Oral three times a day PRN Muscle Spasm  dextrose Oral Gel 15 Gram(s) Oral once PRN Blood Glucose LESS THAN 70 milliGRAM(s)/deciliter  heparin   Injectable 4700 Unit(s) IV Push every 6 hours PRN For aPTT less than 40      CAPILLARY BLOOD GLUCOSE      POCT Blood Glucose.: 261 mg/dL (01 Jul 2022 12:20)  POCT Blood Glucose.: 254 mg/dL (01 Jul 2022 07:51)  POCT Blood Glucose.: 235 mg/dL (30 Jun 2022 21:27)  POCT Blood Glucose.: 272 mg/dL (30 Jun 2022 16:43)    I&O's Summary    30 Jun 2022 07:01  -  01 Jul 2022 07:00  --------------------------------------------------------  IN: 120 mL / OUT: 400 mL / NET: -280 mL        PHYSICAL EXAM:  Vital Signs Last 24 Hrs  T(C): 36.7 (01 Jul 2022 11:10), Max: 37.1 (30 Jun 2022 18:52)  T(F): 98.1 (01 Jul 2022 11:10), Max: 98.8 (30 Jun 2022 18:52)  HR: 90 (01 Jul 2022 11:10) (89 - 97)  BP: 114/69 (01 Jul 2022 11:10) (114/69 - 126/75)  BP(mean): --  RR: 18 (01 Jul 2022 11:10) (18 - 18)  SpO2: 90% (01 Jul 2022 11:10) (90% - 97%)    CONSTITUTIONAL: Elderly male laying in bed in NAD, well-groomed  EYES: EOMI; conjunctiva and sclera clear  ENMT: Moist oral mucosa, no pharyngeal injection or exudates  RESPIRATORY: Normal respiratory effort; lungs with rhonchi but no rales or wheezing  CARDIOVASCULAR: Regular rate and rhythm, normal S1 and S2, no murmur/rub/gallop; No lower extremity edema  ABDOMEN: Nontender to palpation, normoactive bowel sounds, no rebound/guarding  MUSCULOSKELETAL: No clubbing or cyanosis of digits; no joint swelling or tenderness to palpation  PSYCH: A+O to person, place, and time; affect appropriate  NEUROLOGY: CN 2-12 are intact and symmetric; no gross sensory deficits   SKIN: No rashes; no palpable lesions    LABS:                        9.1    8.56  )-----------( 295      ( 01 Jul 2022 04:41 )             29.2     06-30    134<L>  |  100  |  14  ----------------------------<  238<H>  3.9   |  22  |  0.49<L>    Ca    10.0      30 Jun 2022 04:53    TPro  6.2  /  Alb  3.2<L>  /  TBili  0.4  /  DBili  x   /  AST  15  /  ALT  17  /  AlkPhos  114  06-30    PTT - ( 01 Jul 2022 14:20 )  PTT:67.7 sec          Culture - Blood (collected 28 Jun 2022 16:04)  Source: .Blood Blood-Peripheral  Preliminary Report (30 Jun 2022 01:02):    No growth to date.

## 2022-07-01 NOTE — PROGRESS NOTE ADULT - ASSESSMENT
81 m with DM II, HTN, Afib, CAD, 2 MIs s/p 2 stents, COPD with chronic cough, esophageal cancer s/p esophagectomy, p/w fever, SOB, cough, rhinorrhea and sore throat. Cough has been worse for about 2 weeks as per the patient and is dry, sometimes cough while eating and drinking  here febrile, hypotensive, tachycardic, tachypneic and hypoxic, WBC: 11  RVP: entro/rhino  chest CTA: no PE, extensive ill-defined nodularity in both lower lobes and right middle lobes s/o aspiration. Layering secretions completely opacify the bronchus intermedius and right middle and right lower lobe bronchi. Extensive mucous plugging within segmental and subsegmental bronchi in both lower lobes and right middle lobe.    fever, hypotension, tachycardia, tachypnea, hypoxic resp failure, sepsis, viral URI due to entro/rhino   aspiration pneumonia on CT (h/o esophageal marge s/p esophagectomy ) but cough has been the same for 2 weeks, only fever, hypoxia, URI symptoms are new, ?COPD exacerbation with mucous plug   anaphylaxis to penicillin but was given cefepime in ER and tolerated well    * blood cx negative, urine legionella negative  * c/w cefepime 2 q 12, started 6/29, now day 3  * will complete a 7 day course, if ready for discharge, switch to PO cefpodoxime 200 bid to complete the course  * chest PT  * monitor WBC/diff and temp curve    The above assessment and plan was discussed with the primary team    Abi Nichols MD  contact on teams  After 5pm and on weekends call [phone]

## 2022-07-01 NOTE — PROGRESS NOTE ADULT - SUBJECTIVE AND OBJECTIVE BOX
Follow Up:  sepsis, pneumonia    Interval History: pt afebrile, blood cx negative, improved cough    ROS:      All other systems negative    Constitutional: no fever, no chills  ENT:  had sore throat and rhinorrhea, now resolved  Cardiovascular:  no chest pain, no palpitation  Respiratory:  improved SOB,  cough  GI:  no abd pain, no vomiting, no diarrhea  urinary: no dysuria, no hematuria, no flank pain  musculoskeletal:  no joint pain, no joint swelling  skin:  no rash  neurology:  no headache, no seizure        Allergies  penicillins (Anaphylaxis)  sulfa drugs (Unknown)        ANTIMICROBIALS:  cefepime   IVPB 2000 every 12 hours      OTHER MEDS:  acetaminophen     Tablet .. 650 milliGRAM(s) Oral every 6 hours PRN  acetylcysteine 10%  Inhalation 4 milliLiter(s) Inhalation every 8 hours  albuterol/ipratropium for Nebulization 3 milliLiter(s) Nebulizer every 6 hours  atorvastatin 40 milliGRAM(s) Oral at bedtime  budesonide 160 MICROgram(s)/formoterol 4.5 MICROgram(s) Inhaler 2 Puff(s) Inhalation two times a day  chlorhexidine 2% Cloths 1 Application(s) Topical daily  citalopram 20 milliGRAM(s) Oral daily  cyclobenzaprine 5 milliGRAM(s) Oral three times a day PRN  dextrose 5%. 1000 milliLiter(s) IV Continuous <Continuous>  dextrose 5%. 1000 milliLiter(s) IV Continuous <Continuous>  dextrose 50% Injectable 25 Gram(s) IV Push once  dextrose 50% Injectable 12.5 Gram(s) IV Push once  dextrose 50% Injectable 25 Gram(s) IV Push once  dextrose Oral Gel 15 Gram(s) Oral once PRN  glucagon  Injectable 1 milliGRAM(s) IntraMuscular once  guaiFENesin ER 1200 milliGRAM(s) Oral every 12 hours  heparin   Injectable 4700 Unit(s) IV Push every 6 hours PRN  heparin  Infusion.  Unit(s)/Hr IV Continuous <Continuous>  insulin glargine Injectable (LANTUS) 2 Unit(s) SubCutaneous every morning  insulin lispro (ADMELOG) corrective regimen sliding scale   SubCutaneous three times a day before meals  insulin lispro (ADMELOG) corrective regimen sliding scale   SubCutaneous at bedtime  metoprolol tartrate 12.5 milliGRAM(s) Oral every 12 hours  pantoprazole  Injectable 40 milliGRAM(s) IV Push every 12 hours  predniSONE   Tablet 40 milliGRAM(s) Oral daily  pregabalin 100 milliGRAM(s) Oral three times a day  sodium chloride 3%  Inhalation 4 milliLiter(s) Inhalation every 12 hours  tiotropium 18 MICROgram(s) Capsule 1 Capsule(s) Inhalation daily      Vital Signs Last 24 Hrs  T(C): 36.7 (01 Jul 2022 11:10), Max: 37.1 (30 Jun 2022 18:52)  T(F): 98.1 (01 Jul 2022 11:10), Max: 98.8 (30 Jun 2022 18:52)  HR: 90 (01 Jul 2022 11:10) (89 - 97)  BP: 114/69 (01 Jul 2022 11:10) (114/69 - 126/75)  BP(mean): --  RR: 18 (01 Jul 2022 11:10) (18 - 18)  SpO2: 90% (01 Jul 2022 11:10) (90% - 97%)    Physical Exam:  General:    NAD,  non toxic  Respiratory:    clear b/l,    no wheezing  abd:     soft,   BS +,   no tenderness  :   no CVAT,  no suprapubic tenderness,   no  balbuena  Musculoskeletal:   no joint swelling  vascular: no phlebitis  Skin:    no rash                            9.1    8.56  )-----------( 295      ( 01 Jul 2022 04:41 )             29.2       06-30    134<L>  |  100  |  14  ----------------------------<  238<H>  3.9   |  22  |  0.49<L>    Ca    10.0      30 Jun 2022 04:53    TPro  6.2  /  Alb  3.2<L>  /  TBili  0.4  /  DBili  x   /  AST  15  /  ALT  17  /  AlkPhos  114  06-30          MICROBIOLOGY:  v  .Blood Blood-Peripheral  06-28-22   No growth to date.  --  --      .Blood Blood-Peripheral  06-28-22   No growth to date.  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 Single instance of trace silent   aspiration of puree.    For further information and recommendations, please refer to the speech   pathologist final report which is available for review in the electronic   medical record.    < end of copied text >  < from: Xray Chest 1 View- PORTABLE-Urgent (06.28.22 @ 17:11) >    IMPRESSION:  Bibasilar atelectasis and/or pleural effusion.    < end of copied text >  < from: CT Angio Chest PE Protocol w/ IV Cont (06.28.22 @ 16:57) >  IMPRESSION:    1.  No pulmonary thromboembolism to the segmental level    2.  Findings the lower lobes compatible with aspiration. Repeat chest CT   scan is recommended in 6-8 weeks following treatment to ensure resolution    3.  Status post esophagectomy    < end of copied text >

## 2022-07-02 NOTE — PROGRESS NOTE ADULT - SUBJECTIVE AND OBJECTIVE BOX
Interval Events:  None    REVIEW OF SYSTEMS:  Constitutional: No fevers or chills. No weight loss. No fatigue or generalised malaise.  Eyes: No itching or discharge from the eyes  ENT: No ear pain. No ear discharge. No nasal congestion. No post nasal drip. No epistaxis. No throat pain. No sore throat. No difficulty swallowing.   CV: No chest pain. No palpitations. No lightheadedness or dizziness.   Resp: having trouble coughin up the mucus.  GI: No nausea. No vomiting. No diarrhea.  MSK: No joint pain or pain in any extremities  Integumentary: No skin lesions. No pedal edema.  Neurological: No gross motor weakness. No sensory changes.  x[ ] All other systems negative  [ ] Unable to assess ROS because ________    OBJECTIVE:  ICU Vital Signs Last 24 Hrs  T(C): 36.7 (02 Jul 2022 11:49), Max: 36.9 (02 Jul 2022 00:02)  T(F): 98 (02 Jul 2022 11:49), Max: 98.5 (02 Jul 2022 00:02)  HR: 88 (02 Jul 2022 11:49) (86 - 101)  BP: 119/72 (02 Jul 2022 11:49) (107/66 - 120/71)  BP(mean): --  ABP: --  ABP(mean): --  RR: 18 (02 Jul 2022 11:49) (18 - 18)  SpO2: 99% (02 Jul 2022 11:49) (94% - 99%)        CAPILLARY BLOOD GLUCOSE      POCT Blood Glucose.: 249 mg/dL (02 Jul 2022 11:49)      PHYSICAL EXAM:  General: Awake, alert, oriented X 3.   HEENT: egLymph Neck: No JVD. No carotid bruit.   Respiratory: no resp distress, no access muscle use, but b/l rhonchi  Cardiovascular: S1 S2 normal. No murmurs, rubs or gallops.   Abdomen: Soft, non-tender, non-distended. No organomegaly.  Extremities: Warm to touch. Peripheral pulse palpable. No pedal edema.   Skin: No rashes or skin lesions  Neurological: Motor and sensory examination equal and normal in all four extremities.  Psychiatry: Appropriate mood and affect.    HOSPITAL MEDICATIONS:  MEDICATIONS  (STANDING):  acetylcysteine 10%  Inhalation 4 milliLiter(s) Inhalation every 8 hours  albuterol/ipratropium for Nebulization 3 milliLiter(s) Nebulizer every 6 hours  atorvastatin 40 milliGRAM(s) Oral at bedtime  budesonide 160 MICROgram(s)/formoterol 4.5 MICROgram(s) Inhaler 2 Puff(s) Inhalation two times a day  cefepime   IVPB 2000 milliGRAM(s) IV Intermittent every 12 hours  chlorhexidine 2% Cloths 1 Application(s) Topical daily  citalopram 20 milliGRAM(s) Oral daily  dextrose 5%. 1000 milliLiter(s) (50 mL/Hr) IV Continuous <Continuous>  dextrose 5%. 1000 milliLiter(s) (100 mL/Hr) IV Continuous <Continuous>  dextrose 50% Injectable 25 Gram(s) IV Push once  dextrose 50% Injectable 12.5 Gram(s) IV Push once  dextrose 50% Injectable 25 Gram(s) IV Push once  glucagon  Injectable 1 milliGRAM(s) IntraMuscular once  guaiFENesin ER 1200 milliGRAM(s) Oral every 12 hours  heparin  Infusion.  Unit(s)/Hr (9.5 mL/Hr) IV Continuous <Continuous>  insulin glargine Injectable (LANTUS) 5 Unit(s) SubCutaneous every morning  insulin lispro (ADMELOG) corrective regimen sliding scale   SubCutaneous three times a day before meals  insulin lispro (ADMELOG) corrective regimen sliding scale   SubCutaneous at bedtime  iron sucrose Injectable 200 milliGRAM(s) IV Push every 24 hours  metoprolol tartrate 12.5 milliGRAM(s) Oral every 12 hours  pantoprazole  Injectable 40 milliGRAM(s) IV Push every 12 hours  pregabalin 100 milliGRAM(s) Oral three times a day  sodium chloride 3%  Inhalation 4 milliLiter(s) Inhalation every 12 hours  tiotropium 18 MICROgram(s) Capsule 1 Capsule(s) Inhalation daily    MEDICATIONS  (PRN):  acetaminophen     Tablet .. 650 milliGRAM(s) Oral every 6 hours PRN Temp greater or equal to 38C (100.4F), Mild Pain (1 - 3), Moderate Pain (4 - 6)  cyclobenzaprine 5 milliGRAM(s) Oral three times a day PRN Muscle Spasm  dextrose Oral Gel 15 Gram(s) Oral once PRN Blood Glucose LESS THAN 70 milliGRAM(s)/deciliter  heparin   Injectable 4700 Unit(s) IV Push every 6 hours PRN For aPTT less than 40      LABS:                        8.6    6.39  )-----------( 251      ( 02 Jul 2022 07:16 )             28.0           PTT - ( 02 Jul 2022 07:16 )  PTT:56.7 sec          MICROBIOLOGY:     RADIOLOGY:  [ ] Reviewed and interpreted by me    Point of Care Ultrasound Findings:    PFT:    EKG:

## 2022-07-02 NOTE — PROGRESS NOTE ADULT - SUBJECTIVE AND OBJECTIVE BOX
[facility] Division of Hospital Medicine  Shlomo Huanelviraregino   Pager (ANIVAL, 0J-8A): [phone]  Other Times:  [phone]    Patient is a 81y old  Male who presents with a chief complaint of Sepsis 2/2 PNA, COPD exacerbation (02 Jul 2022 13:25)    SUBJECTIVE / OVERNIGHT EVENTS: No acute events overnight. Patient seen and examined at bedside this morning, endorses fatigue and cough, denies chest pain or shortness of breath.    REVIEW OF SYSTEMS:    CONSTITUTIONAL: Endorses fatigue, denies fevers or chills  EYES/ENT: No visual changes;  No vertigo or throat pain   NECK: No pain or stiffness  RESPIRATORY: Endorses cough; No shortness of breath  CARDIOVASCULAR: No chest pain or palpitations  GASTROINTESTINAL: No abdominal or epigastric pain. No nausea, vomiting, or hematemesis; No diarrhea or constipation. No melena or hematochezia.  GENITOURINARY: No dysuria, frequency or hematuria  NEUROLOGICAL: No numbness or tingling  SKIN: No itching, burning, rashes, or lesions  MSK: No joint pain, no back pain  HEME: No easy bleeding, no easy bruising  All other review of systems is negative unless indicated above.    MEDICATIONS  (STANDING):  acetylcysteine 10%  Inhalation 4 milliLiter(s) Inhalation every 8 hours  albuterol/ipratropium for Nebulization 3 milliLiter(s) Nebulizer every 6 hours  atorvastatin 40 milliGRAM(s) Oral at bedtime  budesonide 160 MICROgram(s)/formoterol 4.5 MICROgram(s) Inhaler 2 Puff(s) Inhalation two times a day  cefepime   IVPB 2000 milliGRAM(s) IV Intermittent every 12 hours  chlorhexidine 2% Cloths 1 Application(s) Topical daily  citalopram 20 milliGRAM(s) Oral daily  dextrose 5%. 1000 milliLiter(s) (50 mL/Hr) IV Continuous <Continuous>  dextrose 5%. 1000 milliLiter(s) (100 mL/Hr) IV Continuous <Continuous>  dextrose 50% Injectable 25 Gram(s) IV Push once  dextrose 50% Injectable 12.5 Gram(s) IV Push once  dextrose 50% Injectable 25 Gram(s) IV Push once  glucagon  Injectable 1 milliGRAM(s) IntraMuscular once  guaiFENesin ER 1200 milliGRAM(s) Oral every 12 hours  heparin  Infusion.  Unit(s)/Hr (9.5 mL/Hr) IV Continuous <Continuous>  insulin lispro (ADMELOG) corrective regimen sliding scale   SubCutaneous three times a day before meals  insulin lispro (ADMELOG) corrective regimen sliding scale   SubCutaneous at bedtime  iron sucrose Injectable 200 milliGRAM(s) IV Push every 24 hours  metoprolol tartrate 12.5 milliGRAM(s) Oral every 12 hours  pantoprazole  Injectable 40 milliGRAM(s) IV Push every 12 hours  pregabalin 100 milliGRAM(s) Oral three times a day  sodium chloride 3%  Inhalation 4 milliLiter(s) Inhalation every 12 hours  tiotropium 18 MICROgram(s) Capsule 1 Capsule(s) Inhalation daily    MEDICATIONS  (PRN):  acetaminophen     Tablet .. 650 milliGRAM(s) Oral every 6 hours PRN Temp greater or equal to 38C (100.4F), Mild Pain (1 - 3), Moderate Pain (4 - 6)  cyclobenzaprine 5 milliGRAM(s) Oral three times a day PRN Muscle Spasm  dextrose Oral Gel 15 Gram(s) Oral once PRN Blood Glucose LESS THAN 70 milliGRAM(s)/deciliter  heparin   Injectable 4700 Unit(s) IV Push every 6 hours PRN For aPTT less than 40      CAPILLARY BLOOD GLUCOSE      POCT Blood Glucose.: 249 mg/dL (02 Jul 2022 11:49)  POCT Blood Glucose.: 280 mg/dL (02 Jul 2022 07:57)  POCT Blood Glucose.: 296 mg/dL (01 Jul 2022 21:28)  POCT Blood Glucose.: 282 mg/dL (01 Jul 2022 16:41)    I&O's Summary    02 Jul 2022 07:01  -  02 Jul 2022 16:10  --------------------------------------------------------  IN: 480 mL / OUT: 0 mL / NET: 480 mL        PHYSICAL EXAM:  Vital Signs Last 24 Hrs  T(C): 36.7 (02 Jul 2022 11:49), Max: 36.9 (02 Jul 2022 00:02)  T(F): 98 (02 Jul 2022 11:49), Max: 98.5 (02 Jul 2022 00:02)  HR: 88 (02 Jul 2022 11:49) (86 - 101)  BP: 119/72 (02 Jul 2022 11:49) (107/66 - 120/71)  BP(mean): --  RR: 18 (02 Jul 2022 11:49) (18 - 18)  SpO2: 99% (02 Jul 2022 11:49) (94% - 99%)    CONSTITUTIONAL: Elderly male laying in bed in NAD, well-groomed  EYES: EOMI; conjunctiva and sclera clear  ENMT: Moist oral mucosa, no pharyngeal injection or exudates  RESPIRATORY: Normal respiratory effort; lungs with rhonchi but no rales or wheezing  CARDIOVASCULAR: Regular rate and rhythm, normal S1 and S2, no murmur/rub/gallop; No lower extremity edema  ABDOMEN: Nontender to palpation, normoactive bowel sounds, no rebound/guarding  MUSCULOSKELETAL: No clubbing or cyanosis of digits; no joint swelling or tenderness to palpation  PSYCH: A+O to person, place, and time; affect appropriate  NEUROLOGY: CN 2-12 are intact and symmetric; no gross sensory deficits   SKIN: No rashes; no palpable lesions    LABS:                        8.6    6.39  )-----------( 251      ( 02 Jul 2022 07:16 )             28.0           PTT - ( 02 Jul 2022 14:31 )  PTT:62.2 sec

## 2022-07-02 NOTE — PROGRESS NOTE ADULT - ASSESSMENT
82 y/o man, h/o copd, esophageal ca, acute on chronic resp failure with hypoxia  Pneumonia b/l, entero/rhinovirus infection and also covered for likely aspiration pna as well  Overall feeling better  Stable on NC  Cefepime  Symbicort, Spiriva  albuterol, hypersal  chest vest and acapella for airway clearance

## 2022-07-03 NOTE — PROGRESS NOTE ADULT - SUBJECTIVE AND OBJECTIVE BOX
[facility] Division of Hospital Medicine  Shlomo Huanelviraregino   Pager (ANIVAL, 4U-4S): [phone]  Other Times:  [phone]    Patient is a 81y old  Male who presents with a chief complaint of Sepsis 2/2 PNA, COPD exacerbation (02 Jul 2022 16:10)    SUBJECTIVE / OVERNIGHT EVENTS: No acute events overnight. Patient seen and examined at bedside this morning, feels fatigued but denies shortness of breath. Still endorses cough and not able to bring up much sputum.    REVIEW OF SYSTEMS:    CONSTITUTIONAL: Endorses fatigue, denies fevers or chills  EYES/ENT: No visual changes;  No vertigo or throat pain   NECK: No pain or stiffness  RESPIRATORY: Endorses cough; No shortness of breath  CARDIOVASCULAR: No chest pain or palpitations  GASTROINTESTINAL: No abdominal or epigastric pain. No nausea, vomiting, or hematemesis; No diarrhea or constipation. No melena or hematochezia.  GENITOURINARY: No dysuria, frequency or hematuria  NEUROLOGICAL: No numbness or tingling  SKIN: No itching, burning, rashes, or lesions  MSK: No joint pain, no back pain  HEME: No easy bleeding, no easy bruising  All other review of systems is negative unless indicated above.    MEDICATIONS  (STANDING):  acetylcysteine 10%  Inhalation 4 milliLiter(s) Inhalation every 8 hours  albuterol/ipratropium for Nebulization 3 milliLiter(s) Nebulizer every 6 hours  apixaban 5 milliGRAM(s) Oral every 12 hours  atorvastatin 40 milliGRAM(s) Oral at bedtime  budesonide 160 MICROgram(s)/formoterol 4.5 MICROgram(s) Inhaler 2 Puff(s) Inhalation two times a day  cefepime   IVPB 2000 milliGRAM(s) IV Intermittent every 12 hours  chlorhexidine 2% Cloths 1 Application(s) Topical daily  citalopram 20 milliGRAM(s) Oral daily  dextrose 5%. 1000 milliLiter(s) (100 mL/Hr) IV Continuous <Continuous>  dextrose 5%. 1000 milliLiter(s) (50 mL/Hr) IV Continuous <Continuous>  dextrose 50% Injectable 25 Gram(s) IV Push once  dextrose 50% Injectable 12.5 Gram(s) IV Push once  dextrose 50% Injectable 25 Gram(s) IV Push once  glucagon  Injectable 1 milliGRAM(s) IntraMuscular once  guaiFENesin ER 1200 milliGRAM(s) Oral every 12 hours  insulin glargine Injectable (LANTUS) 8 Unit(s) SubCutaneous every morning  insulin lispro (ADMELOG) corrective regimen sliding scale   SubCutaneous three times a day before meals  insulin lispro (ADMELOG) corrective regimen sliding scale   SubCutaneous at bedtime  iron sucrose Injectable 200 milliGRAM(s) IV Push every 24 hours  metoprolol tartrate 12.5 milliGRAM(s) Oral every 12 hours  pantoprazole  Injectable 40 milliGRAM(s) IV Push every 12 hours  pregabalin 100 milliGRAM(s) Oral three times a day  sodium chloride 3%  Inhalation 4 milliLiter(s) Inhalation every 12 hours  tiotropium 18 MICROgram(s) Capsule 1 Capsule(s) Inhalation daily    MEDICATIONS  (PRN):  acetaminophen     Tablet .. 650 milliGRAM(s) Oral every 6 hours PRN Temp greater or equal to 38C (100.4F), Mild Pain (1 - 3), Moderate Pain (4 - 6)  cyclobenzaprine 5 milliGRAM(s) Oral three times a day PRN Muscle Spasm  dextrose Oral Gel 15 Gram(s) Oral once PRN Blood Glucose LESS THAN 70 milliGRAM(s)/deciliter      CAPILLARY BLOOD GLUCOSE      POCT Blood Glucose.: 244 mg/dL (03 Jul 2022 12:01)  POCT Blood Glucose.: 237 mg/dL (03 Jul 2022 07:37)  POCT Blood Glucose.: 211 mg/dL (02 Jul 2022 21:32)  POCT Blood Glucose.: 240 mg/dL (02 Jul 2022 16:40)    I&O's Summary    02 Jul 2022 07:01  -  03 Jul 2022 07:00  --------------------------------------------------------  IN: 720 mL / OUT: 0 mL / NET: 720 mL        PHYSICAL EXAM:  Vital Signs Last 24 Hrs  T(C): 36.4 (03 Jul 2022 11:00), Max: 37.2 (02 Jul 2022 20:24)  T(F): 97.5 (03 Jul 2022 11:00), Max: 99 (03 Jul 2022 03:55)  HR: 95 (03 Jul 2022 11:00) (94 - 100)  BP: 116/72 (03 Jul 2022 11:00) (112/70 - 116/72)  BP(mean): --  RR: 18 (03 Jul 2022 11:00) (18 - 18)  SpO2: 95% (03 Jul 2022 11:00) (92% - 95%)    CONSTITUTIONAL: Elderly male laying in bed in NAD, well-groomed  EYES: EOMI; conjunctiva and sclera clear  ENMT: Moist oral mucosa, no pharyngeal injection or exudates  RESPIRATORY: Normal respiratory effort; lungs with rhonchi but no rales or wheezing  CARDIOVASCULAR: Regular rate and rhythm, normal S1 and S2, no murmur/rub/gallop; No lower extremity edema  ABDOMEN: Nontender to palpation, normoactive bowel sounds, no rebound/guarding  MUSCULOSKELETAL: No clubbing or cyanosis of digits; no joint swelling or tenderness to palpation  PSYCH: A+O to person, place, and time; affect appropriate  NEUROLOGY: CN 2-12 are intact and symmetric; no gross sensory deficits   SKIN: No rashes; no palpable lesions    LABS:                        9.1    7.62  )-----------( 232      ( 03 Jul 2022 07:27 )             29.1           PTT - ( 03 Jul 2022 07:27 )  PTT:69.8 sec

## 2022-07-03 NOTE — PROGRESS NOTE ADULT - PROBLEM SELECTOR PLAN 7
+FOBT. brown stool. Hgb trending down as an outpt. apixaban was changed to Hep gtt to see if H/H drops more but it did not so switched back to Eliquis. [facility] called to see if pt can cont AC as an outpt. Outpt GI is Victor Manuel Fournier but doesn't consult here  Will give IV iron, no plans for EGD/colonoscopy

## 2022-07-04 NOTE — PROGRESS NOTE ADULT - SUBJECTIVE AND OBJECTIVE BOX
[facility] Division of Hospital Medicine  Shlomo HuanelvirareginoDO  Pager (ANIVAL, 5A-2M): [phone]  Other Times:  [phone]    Patient is a 81y old  Male who presents with a chief complaint of Sepsis 2/2 PNA, COPD exacerbation (03 Jul 2022 16:05)    SUBJECTIVE / OVERNIGHT EVENTS: No acute events overnight. Patient seen and examined at bedside this morning, feels fatigued and has cough but denies shortness of breath.    REVIEW OF SYSTEMS:    CONSTITUTIONAL: Endorses fatigue, denies fevers or chills  EYES/ENT: No visual changes;  No vertigo or throat pain   NECK: No pain or stiffness  RESPIRATORY: Endorses cough; No shortness of breath  CARDIOVASCULAR: No chest pain or palpitations  GASTROINTESTINAL: No abdominal or epigastric pain. No nausea, vomiting, or hematemesis; No diarrhea or constipation. No melena or hematochezia.  GENITOURINARY: No dysuria, frequency or hematuria  NEUROLOGICAL: No numbness or tingling  SKIN: No itching, burning, rashes, or lesions  MSK: No joint pain, no back pain  HEME: No easy bleeding, no easy bruising  All other review of systems is negative unless indicated above.    MEDICATIONS  (STANDING):  acetylcysteine 10%  Inhalation 4 milliLiter(s) Inhalation every 8 hours  albuterol/ipratropium for Nebulization 3 milliLiter(s) Nebulizer every 6 hours  apixaban 5 milliGRAM(s) Oral every 12 hours  atorvastatin 40 milliGRAM(s) Oral at bedtime  budesonide 160 MICROgram(s)/formoterol 4.5 MICROgram(s) Inhaler 2 Puff(s) Inhalation two times a day  cefepime   IVPB 2000 milliGRAM(s) IV Intermittent every 12 hours  chlorhexidine 2% Cloths 1 Application(s) Topical daily  citalopram 20 milliGRAM(s) Oral daily  dextrose 5%. 1000 milliLiter(s) (50 mL/Hr) IV Continuous <Continuous>  dextrose 5%. 1000 milliLiter(s) (100 mL/Hr) IV Continuous <Continuous>  dextrose 50% Injectable 12.5 Gram(s) IV Push once  dextrose 50% Injectable 25 Gram(s) IV Push once  dextrose 50% Injectable 25 Gram(s) IV Push once  glucagon  Injectable 1 milliGRAM(s) IntraMuscular once  guaiFENesin ER 1200 milliGRAM(s) Oral every 12 hours  insulin glargine Injectable (LANTUS) 10 Unit(s) SubCutaneous every morning  insulin lispro (ADMELOG) corrective regimen sliding scale   SubCutaneous three times a day before meals  insulin lispro (ADMELOG) corrective regimen sliding scale   SubCutaneous at bedtime  metoprolol tartrate 12.5 milliGRAM(s) Oral every 12 hours  pantoprazole  Injectable 40 milliGRAM(s) IV Push every 12 hours  potassium chloride   Powder 40 milliEquivalent(s) Oral once  pregabalin 100 milliGRAM(s) Oral three times a day  sodium chloride 3%  Inhalation 4 milliLiter(s) Inhalation every 12 hours  tiotropium 18 MICROgram(s) Capsule 1 Capsule(s) Inhalation daily    MEDICATIONS  (PRN):  acetaminophen     Tablet .. 650 milliGRAM(s) Oral every 6 hours PRN Temp greater or equal to 38C (100.4F), Mild Pain (1 - 3), Moderate Pain (4 - 6)  cyclobenzaprine 5 milliGRAM(s) Oral three times a day PRN Muscle Spasm  dextrose Oral Gel 15 Gram(s) Oral once PRN Blood Glucose LESS THAN 70 milliGRAM(s)/deciliter      CAPILLARY BLOOD GLUCOSE      POCT Blood Glucose.: 221 mg/dL (04 Jul 2022 11:53)  POCT Blood Glucose.: 189 mg/dL (04 Jul 2022 08:08)  POCT Blood Glucose.: 206 mg/dL (03 Jul 2022 21:17)  POCT Blood Glucose.: 200 mg/dL (03 Jul 2022 16:36)    I&O's Summary    04 Jul 2022 07:01  -  04 Jul 2022 15:45  --------------------------------------------------------  IN: 840 mL / OUT: 0 mL / NET: 840 mL        PHYSICAL EXAM:  Vital Signs Last 24 Hrs  T(C): 36.4 (04 Jul 2022 11:13), Max: 37.3 (03 Jul 2022 20:28)  T(F): 97.5 (04 Jul 2022 11:13), Max: 99.2 (03 Jul 2022 20:28)  HR: 85 (04 Jul 2022 11:13) (82 - 87)  BP: 131/75 (04 Jul 2022 11:13) (106/67 - 132/76)  BP(mean): --  RR: 18 (04 Jul 2022 11:13) (18 - 18)  SpO2: 98% (04 Jul 2022 11:13) (95% - 98%)    CONSTITUTIONAL: Elderly male laying in bed in NAD, well-groomed  EYES: EOMI; conjunctiva and sclera clear  ENMT: Moist oral mucosa, no pharyngeal injection or exudates  RESPIRATORY: Normal respiratory effort; lungs with rhonchi but no rales or wheezing  CARDIOVASCULAR: Regular rate and rhythm, normal S1 and S2, no murmur/rub/gallop; No lower extremity edema  ABDOMEN: Nontender to palpation, normoactive bowel sounds, no rebound/guarding  MUSCULOSKELETAL: No clubbing or cyanosis of digits; no joint swelling or tenderness to palpation  PSYCH: A+O to person, place, and time; affect appropriate  NEUROLOGY: CN 2-12 are intact and symmetric; no gross sensory deficits   SKIN: No rashes; no palpable lesions    LABS:                        9.4    7.94  )-----------( 242      ( 04 Jul 2022 05:51 )             31.1     07-04    137  |  99  |  8   ----------------------------<  186<H>  3.4<L>   |  31  |  0.39<L>    Ca    9.4      04 Jul 2022 05:50      PTT - ( 03 Jul 2022 07:27 )  PTT:69.8 sec

## 2022-07-05 NOTE — PROGRESS NOTE ADULT - ASSESSMENT
80 yo M with a h/o COPD, esophageal cancer s/p esophagectomy, recurrent aspiration pneumonia in the past who presents with progressive dyspnea and cough, acute hypoxemia at home. Multifocal pneumonia, sepsis, entero/rhinovirus, likely superimposed bacterial infection    Currently on broad spectrum coverage s/p Vanco and Aztreonam- and now cefepime as per ID from 6/29  Follow up all cultures  Acapella device/VEST rx to aid airway clearance- reviewed technique and to use after nebulizer treatments including hypersal, Incentive spirometer  c/w Bronchodilators, Spiriva and Symbicort  Would hold off on steroids at this time, patient is not bronchospastic on exam  Supplemental O2, goal O2 sats 92-96%  Aspiration precautions, SLP evaluation  DVT ppx   **********************************                                          SEE Below:  6/30-no signif changes  7/1-D3 cefepime (ID)  7/5-D7 cefepime; slow improvements

## 2022-07-05 NOTE — PROGRESS NOTE ADULT - SUBJECTIVE AND OBJECTIVE BOX
Follow Up:  sepsis, pneumonia    Interval History: pt afebrile and now on RA, feels better, cough has improved    ROS:      All other systems negative    Constitutional: no fever, no chills  ENT:  had sore throat and rhinorrhea, now resolved  Cardiovascular:  no chest pain, no palpitation  Respiratory:  improved SOB,  cough  GI:  no abd pain, no vomiting, no diarrhea  urinary: no dysuria, no hematuria, no flank pain  musculoskeletal:  no joint pain, no joint swelling  skin:  no rash  neurology:  no headache, no seizure            Allergies  penicillins (Anaphylaxis)  sulfa drugs (Unknown)        ANTIMICROBIALS:  cefepime   IVPB 2000 every 12 hours      OTHER MEDS:  acetaminophen     Tablet .. 650 milliGRAM(s) Oral every 6 hours PRN  acetylcysteine 10%  Inhalation 4 milliLiter(s) Inhalation every 8 hours  albuterol/ipratropium for Nebulization 3 milliLiter(s) Nebulizer every 6 hours  apixaban 5 milliGRAM(s) Oral every 12 hours  atorvastatin 40 milliGRAM(s) Oral at bedtime  budesonide 160 MICROgram(s)/formoterol 4.5 MICROgram(s) Inhaler 2 Puff(s) Inhalation two times a day  chlorhexidine 2% Cloths 1 Application(s) Topical daily  citalopram 20 milliGRAM(s) Oral daily  cyclobenzaprine 5 milliGRAM(s) Oral three times a day PRN  dextrose 5%. 1000 milliLiter(s) IV Continuous <Continuous>  dextrose 5%. 1000 milliLiter(s) IV Continuous <Continuous>  dextrose 50% Injectable 25 Gram(s) IV Push once  dextrose 50% Injectable 12.5 Gram(s) IV Push once  dextrose 50% Injectable 25 Gram(s) IV Push once  dextrose Oral Gel 15 Gram(s) Oral once PRN  glucagon  Injectable 1 milliGRAM(s) IntraMuscular once  guaiFENesin ER 1200 milliGRAM(s) Oral every 12 hours  insulin glargine Injectable (LANTUS) 12 Unit(s) SubCutaneous every morning  insulin lispro (ADMELOG) corrective regimen sliding scale   SubCutaneous three times a day before meals  insulin lispro (ADMELOG) corrective regimen sliding scale   SubCutaneous at bedtime  metoprolol tartrate 12.5 milliGRAM(s) Oral every 12 hours  pantoprazole  Injectable 40 milliGRAM(s) IV Push every 12 hours  polyethylene glycol 3350 17 Gram(s) Oral two times a day  polyethylene glycol 3350 17 Gram(s) Oral once  pregabalin 100 milliGRAM(s) Oral three times a day  senna 2 Tablet(s) Oral at bedtime  sodium chloride 3%  Inhalation 4 milliLiter(s) Inhalation every 12 hours  tiotropium 18 MICROgram(s) Capsule 1 Capsule(s) Inhalation daily      Vital Signs Last 24 Hrs  T(C): 36.8 (05 Jul 2022 11:34), Max: 37.2 (04 Jul 2022 21:02)  T(F): 98.2 (05 Jul 2022 11:34), Max: 99 (04 Jul 2022 21:02)  HR: 94 (05 Jul 2022 11:34) (89 - 94)  BP: 121/77 (05 Jul 2022 11:34) (117/77 - 139/84)  BP(mean): --  RR: 18 (05 Jul 2022 11:34) (18 - 18)  SpO2: 93% (05 Jul 2022 11:34) (93% - 95%)    Physical Exam:  General:    NAD,  non toxic  Respiratory:  comfortable on RA  abd:     soft,   BS +,   no tenderness  :   no CVAT,  no suprapubic tenderness,   no  balbuena  Musculoskeletal:   no joint swelling  vascular: no phlebitis  Skin:    no rash                          9.9    7.44  )-----------( 240      ( 05 Jul 2022 09:50 )             32.5       07-04    137  |  99  |  8   ----------------------------<  186<H>  3.4<L>   |  31  |  0.39<L>    Ca    9.4      04 Jul 2022 05:50            MICROBIOLOGY:  v  .Blood Blood-Peripheral  06-28-22   No Growth Final  --  --      .Blood Blood-Peripheral  06-28-22   No Growth Final  --  --                RADIOLOGY:  Images independently visualized and reviewed personally, findings as below  < from: Xray Cinesophagram Swallow Function w/ Contrast (06.30.22 @ 09:30) >    IMPRESSION:    There is a changing indentation in the posterior pharynx at the level of   C5, 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 Single instance of trace silent   aspiration of puree.    For further information and recommendations, please refer to the speech   pathologist final report which is available for review in the electronic     < end of copied text >  < from: CT Angio Chest PE Protocol w/ IV Cont (06.28.22 @ 16:57) >  IMPRESSION:    1.  No pulmonary thromboembolism to the segmental level    2.  Findings the lower lobes compatible with aspiration. Repeat chest CT   scan is recommended in 6-8 weeks following treatment to ensure resolution    3.  Status post esophagectomy    < end of copied text >

## 2022-07-05 NOTE — PROGRESS NOTE ADULT - SUBJECTIVE AND OBJECTIVE BOX
CHIEF COMPLAINT: f/up resp failure, copd/asthma, prior aspiration pna, diaphagm dysfunction, osas-now PNA/enterorhinovirus-better than admission-less cough and sob    Interval Events: NO ambulation    REVIEW OF SYSTEMS:  Constitutional: No fevers or chills. No weight loss. + fatigue or generalized malaise.  Eyes: No itching or discharge from the eyes  ENT: No ear pain. No ear discharge. No nasal congestion. No post nasal drip. No epistaxis. No throat pain. No sore throat. No difficulty swallowing.   CV: No chest pain. No palpitations. No lightheadedness or dizziness.   Resp: No dyspnea at rest. No dyspnea on exertion. No orthopnea. No wheezing. No cough. No stridor. No sputum production. No chest pain with respiration.  GI: No nausea. No vomiting. No diarrhea.  MSK: No joint pain or pain in any extremities  Integumentary: No skin lesions. No pedal edema.  Neurological: + gross motor weakness. No sensory changes.  [+ ] All other systems negative  [ ] Unable to assess ROS because ________    OBJECTIVE:  ICU Vital Signs Last 24 Hrs  T(C): 36.6 (05 Jul 2022 05:11), Max: 37.2 (04 Jul 2022 21:02)  T(F): 97.9 (05 Jul 2022 05:11), Max: 99 (04 Jul 2022 21:02)  HR: 89 (05 Jul 2022 05:11) (85 - 94)  BP: 117/77 (05 Jul 2022 05:11) (117/77 - 139/84)  BP(mean): --  ABP: --  ABP(mean): --  RR: 18 (05 Jul 2022 05:11) (18 - 18)  SpO2: 93% (05 Jul 2022 05:11) (93% - 98%)        07-04 @ 07:01  -  07-05 @ 05:45  --------------------------------------------------------  IN: 1080 mL / OUT: 0 mL / NET: 1080 mL      CAPILLARY BLOOD GLUCOSE      POCT Blood Glucose.: 169 mg/dL (04 Jul 2022 21:45)      PHYSICAL EXAM: NAD in bed on NC O2 1.5 liters  General: Awake, alert, oriented X 3.   HEENT: Atraumatic, normocephalic.                 Mallampatti Grade 3                 No nasal congestion.                No tonsillar or pharyngeal exudates.  Lymph Nodes: No palpable lymphadenopathy  Neck: No JVD. No carotid bruit.   Respiratory: abnormal chest expansion                         Normal percussion                         Normal and equal air entry                         No wheeze, rhonchi but insp rales.  Cardiovascular: S1 S2 normal. No murmurs, rubs or gallops.   Abdomen: Soft, non-tender, non-distended. No organomegaly. Normoactive bowel sounds.  Extremities: Warm to touch. Peripheral pulse palpable. No pedal edema.   Skin: No rashes or skin lesions  Neurological: Motor and sensory examination equal and normal in all four extremities.  Psychiatry: Appropriate mood and affect.    HOSPITAL MEDICATIONS:  MEDICATIONS  (STANDING):  acetylcysteine 10%  Inhalation 4 milliLiter(s) Inhalation every 8 hours  albuterol/ipratropium for Nebulization 3 milliLiter(s) Nebulizer every 6 hours  apixaban 5 milliGRAM(s) Oral every 12 hours  atorvastatin 40 milliGRAM(s) Oral at bedtime  budesonide 160 MICROgram(s)/formoterol 4.5 MICROgram(s) Inhaler 2 Puff(s) Inhalation two times a day  cefepime   IVPB 2000 milliGRAM(s) IV Intermittent every 12 hours  chlorhexidine 2% Cloths 1 Application(s) Topical daily  citalopram 20 milliGRAM(s) Oral daily  dextrose 5%. 1000 milliLiter(s) (50 mL/Hr) IV Continuous <Continuous>  dextrose 5%. 1000 milliLiter(s) (100 mL/Hr) IV Continuous <Continuous>  dextrose 50% Injectable 25 Gram(s) IV Push once  dextrose 50% Injectable 12.5 Gram(s) IV Push once  dextrose 50% Injectable 25 Gram(s) IV Push once  glucagon  Injectable 1 milliGRAM(s) IntraMuscular once  guaiFENesin ER 1200 milliGRAM(s) Oral every 12 hours  insulin glargine Injectable (LANTUS) 12 Unit(s) SubCutaneous every morning  insulin lispro (ADMELOG) corrective regimen sliding scale   SubCutaneous three times a day before meals  insulin lispro (ADMELOG) corrective regimen sliding scale   SubCutaneous at bedtime  metoprolol tartrate 12.5 milliGRAM(s) Oral every 12 hours  pantoprazole  Injectable 40 milliGRAM(s) IV Push every 12 hours  pregabalin 100 milliGRAM(s) Oral three times a day  sodium chloride 3%  Inhalation 4 milliLiter(s) Inhalation every 12 hours  tiotropium 18 MICROgram(s) Capsule 1 Capsule(s) Inhalation daily    MEDICATIONS  (PRN):  acetaminophen     Tablet .. 650 milliGRAM(s) Oral every 6 hours PRN Temp greater or equal to 38C (100.4F), Mild Pain (1 - 3), Moderate Pain (4 - 6)  cyclobenzaprine 5 milliGRAM(s) Oral three times a day PRN Muscle Spasm  dextrose Oral Gel 15 Gram(s) Oral once PRN Blood Glucose LESS THAN 70 milliGRAM(s)/deciliter      LABS:                        9.4    7.94  )-----------( 242      ( 04 Jul 2022 05:51 )             31.1     07-04    137  |  99  |  8   ----------------------------<  186<H>  3.4<L>   |  31  |  0.39<L>    Ca    9.4      04 Jul 2022 05:50      PTT - ( 03 Jul 2022 07:27 )  PTT:69.8 sec          MICROBIOLOGY:     RADIOLOGY:  [ ] Reviewed and interpreted by me    Point of Care Ultrasound Findings:    PFT:    EKG:

## 2022-07-05 NOTE — PROGRESS NOTE ADULT - SUBJECTIVE AND OBJECTIVE BOX
[facility] Division of Hospital Medicine  Angelina Newby MD  Pager (M-F, 8M-7A): [phone]  Other Times:  [phone]    Patient is a 81y old  Male who presents with a chief complaint of Sepsis 2/2 PNA, COPD exacerbation (05 Jul 2022 05:45)      SUBJECTIVE / OVERNIGHT EVENTS:  feeling well. denies any complaints. no acute overnight issues.  afebrile      ADDITIONAL REVIEW OF SYSTEMS: otherwise neg    MEDICATIONS  (STANDING):  acetylcysteine 10%  Inhalation 4 milliLiter(s) Inhalation every 8 hours  albuterol/ipratropium for Nebulization 3 milliLiter(s) Nebulizer every 6 hours  apixaban 5 milliGRAM(s) Oral every 12 hours  atorvastatin 40 milliGRAM(s) Oral at bedtime  budesonide 160 MICROgram(s)/formoterol 4.5 MICROgram(s) Inhaler 2 Puff(s) Inhalation two times a day  cefepime   IVPB 2000 milliGRAM(s) IV Intermittent every 12 hours  chlorhexidine 2% Cloths 1 Application(s) Topical daily  citalopram 20 milliGRAM(s) Oral daily  dextrose 5%. 1000 milliLiter(s) (100 mL/Hr) IV Continuous <Continuous>  dextrose 5%. 1000 milliLiter(s) (50 mL/Hr) IV Continuous <Continuous>  dextrose 50% Injectable 25 Gram(s) IV Push once  dextrose 50% Injectable 12.5 Gram(s) IV Push once  dextrose 50% Injectable 25 Gram(s) IV Push once  glucagon  Injectable 1 milliGRAM(s) IntraMuscular once  guaiFENesin ER 1200 milliGRAM(s) Oral every 12 hours  insulin glargine Injectable (LANTUS) 12 Unit(s) SubCutaneous every morning  insulin lispro (ADMELOG) corrective regimen sliding scale   SubCutaneous three times a day before meals  insulin lispro (ADMELOG) corrective regimen sliding scale   SubCutaneous at bedtime  metoprolol tartrate 12.5 milliGRAM(s) Oral every 12 hours  pantoprazole  Injectable 40 milliGRAM(s) IV Push every 12 hours  polyethylene glycol 3350 17 Gram(s) Oral two times a day  polyethylene glycol 3350 17 Gram(s) Oral once  pregabalin 100 milliGRAM(s) Oral three times a day  senna 2 Tablet(s) Oral at bedtime  sodium chloride 3%  Inhalation 4 milliLiter(s) Inhalation every 12 hours  tiotropium 18 MICROgram(s) Capsule 1 Capsule(s) Inhalation daily    MEDICATIONS  (PRN):  acetaminophen     Tablet .. 650 milliGRAM(s) Oral every 6 hours PRN Temp greater or equal to 38C (100.4F), Mild Pain (1 - 3), Moderate Pain (4 - 6)  cyclobenzaprine 5 milliGRAM(s) Oral three times a day PRN Muscle Spasm  dextrose Oral Gel 15 Gram(s) Oral once PRN Blood Glucose LESS THAN 70 milliGRAM(s)/deciliter      CAPILLARY BLOOD GLUCOSE      POCT Blood Glucose.: 178 mg/dL (05 Jul 2022 11:37)  POCT Blood Glucose.: 216 mg/dL (05 Jul 2022 07:34)  POCT Blood Glucose.: 169 mg/dL (04 Jul 2022 21:45)  POCT Blood Glucose.: 228 mg/dL (04 Jul 2022 16:54)    I&O's Summary    04 Jul 2022 07:01  -  05 Jul 2022 07:00  --------------------------------------------------------  IN: 1080 mL / OUT: 0 mL / NET: 1080 mL    05 Jul 2022 07:01  -  05 Jul 2022 15:24  --------------------------------------------------------  IN: 780 mL / OUT: 0 mL / NET: 780 mL        PHYSICAL EXAM:  Vital Signs Last 24 Hrs  T(C): 36.8 (05 Jul 2022 11:34), Max: 37.2 (04 Jul 2022 21:02)  T(F): 98.2 (05 Jul 2022 11:34), Max: 99 (04 Jul 2022 21:02)  HR: 94 (05 Jul 2022 11:34) (89 - 94)  BP: 121/77 (05 Jul 2022 11:34) (117/77 - 139/84)  BP(mean): --  RR: 18 (05 Jul 2022 11:34) (18 - 18)  SpO2: 93% (05 Jul 2022 11:34) (93% - 95%)    CONSTITUTIONAL: NAD, well-groomed  EYES:  conjunctiva and sclera clear  ENMT: Moist oral mucosa  NECK: Supple, no palpable masses; no JVD  RESPIRATORY: bibasilar rhonchi. no wheezing  CARDIOVASCULAR: Regular rate and rhythm, normal S1 and S2, no murmur/rub/gallop; No lower extremity edema  ABDOMEN: Nontender to palpation, normoactive bowel sounds, no rebound/guarding  MUSCULOSKELETAL:  no clubbing or cyanosis of digits; no joint swelling or tenderness to palpation  PSYCH: A+O to person, place, and time; affect appropriate  SKIN: No rashes; no palpable lesions    LABS:                        9.9    7.44  )-----------( 240      ( 05 Jul 2022 09:50 )             32.5     07-04    137  |  99  |  8   ----------------------------<  186<H>  3.4<L>   |  31  |  0.39<L>    Ca    9.4      04 Jul 2022 05:50                  RADIOLOGY & ADDITIONAL TESTS:  Results Reviewed:   Imaging Personally Reviewed:  Electrocardiogram Personally Reviewed:    COORDINATION OF CARE:  Care Discussed with Consultants/Other Providers [Y/N]:  Prior or Outpatient Records Reviewed [Y/N]:

## 2022-07-05 NOTE — PROGRESS NOTE ADULT - PROBLEM SELECTOR PLAN 1
S/p 1 dose IV vanco in ED and started on cefepime. CT angio showed concerns for aspiration pneumonia. +enterovirus  -cefepime(D7) would stop after today.   -f/u blood cultures-NGTD  -legionella neg  -ID consult appreciated

## 2022-07-05 NOTE — PROGRESS NOTE ADULT - ASSESSMENT
81 m with DM II, HTN, Afib, CAD, 2 MIs s/p 2 stents, COPD with chronic cough, esophageal cancer s/p esophagectomy, p/w fever, SOB, cough, rhinorrhea and sore throat. Cough has been worse for about 2 weeks as per the patient and is dry, sometimes cough while eating and drinking  here febrile, hypotensive, tachycardic, tachypneic and hypoxic, WBC: 11  RVP: entro/rhino  chest CTA: no PE, extensive ill-defined nodularity in both lower lobes and right middle lobes s/o aspiration. Layering secretions completely opacify the bronchus intermedius and right middle and right lower lobe bronchi. Extensive mucous plugging within segmental and subsegmental bronchi in both lower lobes and right middle lobe.    fever, hypotension, tachycardia, tachypnea, hypoxic resp failure, sepsis, viral URI due to entro/rhino   aspiration pneumonia on CT (h/o esophageal marge s/p esophagectomy ) but cough has been the same for 2 weeks, only fever, hypoxia, URI symptoms are new, ?COPD exacerbation with mucous plug   anaphylaxis to penicillin but was given cefepime in ER and tolerated well    * blood cx negative, urine legionella negative, pt clinically better and on RA  * complete a 7 day course of cefepime today, discontinue after the today's doses  * will sign off, please call with questions    The above assessment and plan was discussed with the primary team    Abi Nichols MD  contact on teams  After 5pm and on weekends call [phone]

## 2022-07-05 NOTE — PROGRESS NOTE ADULT - TIME BILLING
as above:  multifactorial dyspnea-resp failure, copd/asthma, prior aspiration pna, diaphagm dysfunction, osas-now PNA/enterorhinovirus--O2 sat above 90%  PNA-aspiration--Sp and sw evaln, cefepime 6/29-D7 duration as per ID, check all cx, f/up CXR today 7/5  copd/mucus plugging--acapella/chest vest rx, duoneb q 6, hypersal q 6, mucomyst 2 cc 10% q 8, symbicort/spiriva; hold             steroids (s/p pred 40)-now off rx  GI-aspiration --sp and sw evaln, precautions                        OSAS-refused rx prior  DVT prophylaxis  PT evaln--OOB                           BS control    CV-mult rx-back on eliquis  DC planning to rehab next 24-48 hrs    Renan Delgado MD-Pulmonary   [phone]

## 2022-07-06 NOTE — PROVIDER CONTACT NOTE (OTHER) - ACTION/TREATMENT ORDERED:
Provider notified and aware. Will continue to monitor.
Notified provider and is aware. Repeat bladder scan in 6 hours and have patient on balbuena catheter on third attempt. If patient refuses, straight cath the patient. Assess if patient is symptomatic.
Provider aware recommends straight cath.

## 2022-07-06 NOTE — PROVIDER CONTACT NOTE (OTHER) - SITUATION
Tele event: Patient SR to A fib, rate controlled in the 90s.
Patient has been retaining urine and had his second straight cath.   His urine output was 740 mL. Patient's bladder scan right after the cath was 334mL. Patient refused to be straight cath again.
Pt complaining of not being able to void.

## 2022-07-06 NOTE — PROGRESS NOTE ADULT - PROBLEM SELECTOR PLAN 1
S/p 1 dose IV vanco in ED and started on cefepime. CT angio showed concerns for aspiration pneumonia. +enterovirus  -cefepime(D7) completed.  -f/u blood cultures-NGTD  -legionella neg  -ID consult appreciated

## 2022-07-06 NOTE — PROVIDER CONTACT NOTE (OTHER) - BACKGROUND
80 yo M PMH of COPD, DM II, Afib, CAD s/p 3 stents, esophageal cancer s/p esophagectomy, peripheral neuropathy and HTN who presents with 3 days of SOB and dry cough.
82 yo M PMH of COPD, DM II, afib and came in with pneumonia.
Patient admitted for Pneumonia. PMH of COPD, DM2, A fib, CAD.

## 2022-07-06 NOTE — PROGRESS NOTE ADULT - SUBJECTIVE AND OBJECTIVE BOX
CHIEF COMPLAINT: f/up resp failure, copd/asthma, prior aspiration pna, diaphagm dysfunction, osas-now PNA/enterorhinovirus-better and more comfortable-minimal cough    Interval Events: completed ABX    REVIEW OF SYSTEMS:  Constitutional: No fevers or chills. No weight loss. + fatigue or generalized malaise.  Eyes: No itching or discharge from the eyes  ENT: No ear pain. No ear discharge. No nasal congestion. No post nasal drip. No epistaxis. No throat pain. No sore throat. No difficulty swallowing.   CV: No chest pain. No palpitations. No lightheadedness or dizziness.   Resp: No dyspnea at rest. No dyspnea on exertion. No orthopnea. No wheezing. No cough. No stridor. No sputum production. No chest pain with respiration.  GI: No nausea. No vomiting. No diarrhea.  MSK: No joint pain or pain in any extremities  Integumentary: No skin lesions. No pedal edema.  Neurological: + gross motor weakness. No sensory changes.  [+ ] All other systems negative  [ ] Unable to assess ROS because ________    OBJECTIVE:  ICU Vital Signs Last 24 Hrs  T(C): 36.6 (06 Jul 2022 05:03), Max: 36.9 (05 Jul 2022 20:58)  T(F): 97.9 (06 Jul 2022 05:03), Max: 98.4 (05 Jul 2022 20:58)  HR: 97 (06 Jul 2022 05:03) (93 - 97)  BP: 132/86 (06 Jul 2022 05:03) (115/70 - 132/86)  BP(mean): --  ABP: --  ABP(mean): --  RR: 18 (06 Jul 2022 05:03) (18 - 18)  SpO2: 97% (06 Jul 2022 05:03) (93% - 97%)        07-04 @ 07:01  -  07-05 @ 07:00  --------------------------------------------------------  IN: 1080 mL / OUT: 0 mL / NET: 1080 mL    07-05 @ 07:01 - 07-06 @ 05:35  --------------------------------------------------------  IN: 980 mL / OUT: 950 mL / NET: 30 mL      CAPILLARY BLOOD GLUCOSE      POCT Blood Glucose.: 123 mg/dL (05 Jul 2022 21:38)      PHYSICAL EXAM: NAD in bed on NC  General: Awake, alert, oriented X 3.   HEENT: Atraumatic, normocephalic.                 Mallampatti Grade 3                No nasal congestion.                No tonsillar or pharyngeal exudates.  Lymph Nodes: No palpable lymphadenopathy  Neck: No JVD. No carotid bruit.   Respiratory: abnormal chest expansion                         Normal percussion                         Normal and equal air entry                         No wheeze, rhonchi or rales.  Cardiovascular: S1 S2 normal. No murmurs, rubs or gallops.   Abdomen: Soft, non-tender, non-distended. No organomegaly. Normoactive bowel sounds.  Extremities: Warm to touch. Peripheral pulse palpable. No pedal edema.   Skin: No rashes or skin lesions  Neurological: Motor and sensory examination equal and normal in all four extremities.  Psychiatry: Appropriate mood and affect.    HOSPITAL MEDICATIONS:  MEDICATIONS  (STANDING):  acetylcysteine 10%  Inhalation 4 milliLiter(s) Inhalation every 8 hours  albuterol/ipratropium for Nebulization 3 milliLiter(s) Nebulizer every 6 hours  apixaban 5 milliGRAM(s) Oral every 12 hours  atorvastatin 40 milliGRAM(s) Oral at bedtime  budesonide 160 MICROgram(s)/formoterol 4.5 MICROgram(s) Inhaler 2 Puff(s) Inhalation two times a day  chlorhexidine 2% Cloths 1 Application(s) Topical daily  citalopram 20 milliGRAM(s) Oral daily  dextrose 5%. 1000 milliLiter(s) (100 mL/Hr) IV Continuous <Continuous>  dextrose 5%. 1000 milliLiter(s) (50 mL/Hr) IV Continuous <Continuous>  dextrose 50% Injectable 25 Gram(s) IV Push once  dextrose 50% Injectable 12.5 Gram(s) IV Push once  dextrose 50% Injectable 25 Gram(s) IV Push once  glucagon  Injectable 1 milliGRAM(s) IntraMuscular once  guaiFENesin ER 1200 milliGRAM(s) Oral every 12 hours  insulin glargine Injectable (LANTUS) 12 Unit(s) SubCutaneous every morning  insulin lispro (ADMELOG) corrective regimen sliding scale   SubCutaneous three times a day before meals  insulin lispro (ADMELOG) corrective regimen sliding scale   SubCutaneous at bedtime  metoprolol tartrate 12.5 milliGRAM(s) Oral every 12 hours  pantoprazole  Injectable 40 milliGRAM(s) IV Push every 12 hours  polyethylene glycol 3350 17 Gram(s) Oral two times a day  pregabalin 100 milliGRAM(s) Oral three times a day  senna 2 Tablet(s) Oral at bedtime  sodium chloride 3%  Inhalation 4 milliLiter(s) Inhalation every 12 hours  tiotropium 18 MICROgram(s) Capsule 1 Capsule(s) Inhalation daily    MEDICATIONS  (PRN):  acetaminophen     Tablet .. 650 milliGRAM(s) Oral every 6 hours PRN Temp greater or equal to 38C (100.4F), Mild Pain (1 - 3), Moderate Pain (4 - 6)  cyclobenzaprine 5 milliGRAM(s) Oral three times a day PRN Muscle Spasm  dextrose Oral Gel 15 Gram(s) Oral once PRN Blood Glucose LESS THAN 70 milliGRAM(s)/deciliter      LABS:                        9.9    7.44  )-----------( 240      ( 05 Jul 2022 09:50 )             32.5     07-04    137  |  99  |  8   ----------------------------<  186<H>  3.4<L>   |  31  |  0.39<L>    Ca    9.4      04 Jul 2022 05:50                MICROBIOLOGY:     RADIOLOGY:   [ ] Reviewed and interpreted by me    Point of Care Ultrasound Findings:    PFT:    EKG:

## 2022-07-06 NOTE — PROGRESS NOTE ADULT - ASSESSMENT
80 yo M with a h/o COPD, esophageal cancer s/p esophagectomy, recurrent aspiration pneumonia in the past who presents with progressive dyspnea and cough, acute hypoxemia at home. Multifocal pneumonia, sepsis, entero/rhinovirus, likely superimposed bacterial infection    Currently on broad spectrum coverage s/p Vanco and Aztreonam- and now cefepime as per ID from 6/29  Follow up all cultures  Acapella device/VEST rx to aid airway clearance- reviewed technique and to use after nebulizer treatments including hypersal, Incentive spirometer  c/w Bronchodilators, Spiriva and Symbicort  Would hold off on steroids at this time, patient is not bronchospastic on exam  Supplemental O2, goal O2 sats 92-96%  Aspiration precautions, SLP evaluation  DVT ppx   **********************************                                          SEE Below:  6/30-no signif changes  7/1-D3 cefepime (ID)  7/5-D7 cefepime; slow improvements  7/6-stable for rehab-no new complaints-ABX completed

## 2022-07-06 NOTE — PROVIDER CONTACT NOTE (OTHER) - ASSESSMENT
A&Ox4,SR, RA, VSS, and denies any pain or discomfort.
Pt abdomin distended tender to palpate. Bladder scan shows 1000mL retaining fluids.
Patient VSS. No complaints of CP, SOB, or dizziness.

## 2022-07-06 NOTE — PROGRESS NOTE ADULT - SUBJECTIVE AND OBJECTIVE BOX
[facility] Division of Hospital Medicine  Angelina Newby MD  Pager (M-F, 6W-9K): [phone]  Other Times:  [phone]    Patient is a 81y old  Male who presents with a chief complaint of Sepsis 2/2 PNA, COPD exacerbation (06 Jul 2022 05:35)      SUBJECTIVE / OVERNIGHT EVENTS:  overnight issues with urinary retention. patietnt currently denies any symptoms.   afebrile     ADDITIONAL REVIEW OF SYSTEMS: otherwise neg    MEDICATIONS  (STANDING):  acetylcysteine 10%  Inhalation 4 milliLiter(s) Inhalation every 8 hours  albuterol/ipratropium for Nebulization 3 milliLiter(s) Nebulizer every 6 hours  apixaban 5 milliGRAM(s) Oral every 12 hours  atorvastatin 40 milliGRAM(s) Oral at bedtime  budesonide 160 MICROgram(s)/formoterol 4.5 MICROgram(s) Inhaler 2 Puff(s) Inhalation two times a day  chlorhexidine 2% Cloths 1 Application(s) Topical daily  citalopram 20 milliGRAM(s) Oral daily  dextrose 5%. 1000 milliLiter(s) (100 mL/Hr) IV Continuous <Continuous>  dextrose 5%. 1000 milliLiter(s) (50 mL/Hr) IV Continuous <Continuous>  dextrose 50% Injectable 25 Gram(s) IV Push once  dextrose 50% Injectable 12.5 Gram(s) IV Push once  dextrose 50% Injectable 25 Gram(s) IV Push once  glucagon  Injectable 1 milliGRAM(s) IntraMuscular once  guaiFENesin ER 1200 milliGRAM(s) Oral every 12 hours  insulin glargine Injectable (LANTUS) 12 Unit(s) SubCutaneous every morning  insulin lispro (ADMELOG) corrective regimen sliding scale   SubCutaneous three times a day before meals  insulin lispro (ADMELOG) corrective regimen sliding scale   SubCutaneous at bedtime  metoprolol tartrate 12.5 milliGRAM(s) Oral every 12 hours  pantoprazole  Injectable 40 milliGRAM(s) IV Push every 12 hours  polyethylene glycol 3350 17 Gram(s) Oral two times a day  pregabalin 100 milliGRAM(s) Oral three times a day  senna 2 Tablet(s) Oral at bedtime  sodium chloride 3%  Inhalation 4 milliLiter(s) Inhalation every 12 hours  tiotropium 18 MICROgram(s) Capsule 1 Capsule(s) Inhalation daily    MEDICATIONS  (PRN):  acetaminophen     Tablet .. 650 milliGRAM(s) Oral every 6 hours PRN Temp greater or equal to 38C (100.4F), Mild Pain (1 - 3), Moderate Pain (4 - 6)  cyclobenzaprine 5 milliGRAM(s) Oral three times a day PRN Muscle Spasm  dextrose Oral Gel 15 Gram(s) Oral once PRN Blood Glucose LESS THAN 70 milliGRAM(s)/deciliter      CAPILLARY BLOOD GLUCOSE      POCT Blood Glucose.: 275 mg/dL (06 Jul 2022 12:01)  POCT Blood Glucose.: 203 mg/dL (06 Jul 2022 07:59)  POCT Blood Glucose.: 123 mg/dL (05 Jul 2022 21:38)  POCT Blood Glucose.: 193 mg/dL (05 Jul 2022 16:35)    I&O's Summary    05 Jul 2022 07:01  -  06 Jul 2022 07:00  --------------------------------------------------------  IN: 980 mL / OUT: 950 mL / NET: 30 mL    06 Jul 2022 07:01  -  06 Jul 2022 16:28  --------------------------------------------------------  IN: 660 mL / OUT: 0 mL / NET: 660 mL        PHYSICAL EXAM:  Vital Signs Last 24 Hrs  T(C): 36.4 (06 Jul 2022 11:03), Max: 36.9 (05 Jul 2022 20:58)  T(F): 97.6 (06 Jul 2022 11:03), Max: 98.4 (05 Jul 2022 20:58)  HR: 106 (06 Jul 2022 11:03) (92 - 106)  BP: 105/70 (06 Jul 2022 11:03) (105/70 - 132/86)  BP(mean): --  RR: 18 (06 Jul 2022 11:03) (18 - 18)  SpO2: 92% (06 Jul 2022 11:03) (92% - 97%)    CONSTITUTIONAL: NAD, well-groomed  EYES:  conjunctiva and sclera clear  ENMT: Moist oral mucosa  NECK: Supple, no palpable masses; no JVD  RESPIRATORY: bibasilar rhonchi. no wheezing  CARDIOVASCULAR: Regular rate and rhythm, normal S1 and S2, no murmur/rub/gallop; No lower extremity edema  ABDOMEN: Nontender to palpation, normoactive bowel sounds, no rebound/guarding  MUSCULOSKELETAL:  no clubbing or cyanosis of digits; no joint swelling or tenderness to palpation  PSYCH: A+O to person, place, and time; affect appropriate  SKIN: No rashes; no palpable lesions    LABS:                        9.8    7.99  )-----------( 244      ( 06 Jul 2022 07:06 )             32.1     07-06    133<L>  |  95<L>  |  9   ----------------------------<  142<H>  4.1   |  28  |  0.40<L>    Ca    9.3      06 Jul 2022 07:05                  RADIOLOGY & ADDITIONAL TESTS:  Results Reviewed:   Imaging Personally Reviewed:  Electrocardiogram Personally Reviewed:    COORDINATION OF CARE:  Care Discussed with Consultants/Other Providers [Y/N]:  Prior or Outpatient Records Reviewed [Y/N]:

## 2022-07-06 NOTE — PROVIDER CONTACT NOTE (OTHER) - REASON
Tele event: SR to A-fib
Patient's straight cath urine output is 740 mL. Pt retaining 334 after Bladder Scan
retaining urine

## 2022-07-06 NOTE — PROGRESS NOTE ADULT - TIME BILLING
as above: completed ABX-await rehab  multifactorial dyspnea-resp failure, copd/asthma, prior aspiration pna, diaphagm dysfunction, osas-now PNA/enterorhinovirus--O2 sat above 90%  PNA-aspiration--Sp and sw evaln, cefepime 6/29-D7 duration as per ID (completed 7/5) f/up CXR today 7/5 (not done)  copd/mucus plugging--acapella/chest vest rx, duoneb q 6, hypersal q 6, mucomyst 2 cc 10% q 8, symbicort/spiriva; hold             steroids (s/p pred 40)-now off rx  GI-aspiration --sp and sw evaln, precautions                        OSAS-refused rx prior  DVT prophylaxis  PT evaln--OOB                           BS control    CV-mult rx-back on eliquis  DC planning to rehab next 24-48 hrs; resp cleared    Renan Delgado MD-Pulmonary   [phone]

## 2022-07-07 PROBLEM — I82.409 ACUTE EMBOLISM AND THROMBOSIS OF UNSPECIFIED DEEP VEINS OF UNSPECIFIED LOWER EXTREMITY: Chronic | Status: ACTIVE | Noted: 2022-01-01

## 2022-07-07 PROBLEM — J44.1 CHRONIC OBSTRUCTIVE PULMONARY DISEASE WITH (ACUTE) EXACERBATION: Chronic | Status: ACTIVE | Noted: 2022-01-01

## 2022-07-07 NOTE — PROGRESS NOTE ADULT - PROBLEM SELECTOR PLAN 10
DVT ppx: c/w Eliquis  Dispo: [facility] when stable
c/w celexa
DVT ppx: c/w Eliquis  Dispo: pending clinical course, PT recs
DVT ppx: c/w Hep gtt  Dispo: pending clinical course, PT recs
c/w celexa
DVT ppx: c/w Eliquis  Dispo: pending clinical course, PT recs
DVT ppx: c/w Hep gtt  Dispo: pending clinical course, PT recs
DVT ppx: c/w Hep gtt  Dispo: pending clinical course, PT recs
DVT ppx: c/w eliquis  Diet: NPO pending s/s eval in AM, replete electrolytes as needed  Dispo: pending clinical course, PT recs

## 2022-07-07 NOTE — DISCHARGE NOTE PROVIDER - REASON FOR ADMISSION
[FreeTextEntry1] : Hyperprolactinemia\par Suspect likely 2/2 hypothyroidism. Likely Hashimoto's given that she already has an autoimmune disease.\par Both her thyroid and prolactin levels have normalized. In the past  she had positive thyroglobulin antibodies which are undetectable now. It is quite possible that the Rituxan patient has been on has suppressed her thyroid antibodies. There have been cases reported where patient with concurrent SLE and thyroid disorder had less medication required for their thyroid over time. Will periodically monitor her for now and while she completes her Rituxan therapy\par \par Amenorrhea\par PAtient has secondary amenorrhea. 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 Will check her FSH, LH, E2 and progesterone \par Will also assess her bone density given her several risk factors ( amenorrhea, prednisone, lupus)\par \par 
Sepsis 2/2 PNA, COPD exacerbation

## 2022-07-07 NOTE — PROGRESS NOTE ADULT - ASSESSMENT
82 yo M with a h/o COPD, esophageal cancer s/p esophagectomy, recurrent aspiration pneumonia in the past who presents with progressive dyspnea and cough, acute hypoxemia at home. Multifocal pneumonia, sepsis, entero/rhinovirus, likely superimposed bacterial infection    Currently on broad spectrum coverage s/p Vanco and Aztreonam- and s/p cefepime as per ID from 6/29  Follow up all cultures  Acapella device/VEST rx to aid airway clearance- reviewed technique and to use after nebulizer treatments including hypersal, Incentive spirometer  c/w Bronchodilators, Spiriva and Symbicort  Would hold off on steroids at this time, patient is not bronchospastic on exam  Supplemental O2, goal O2 sats 92-96%  Aspiration precautions, SLP evaluation  DVT ppx   **********************************                                          SEE Below:  6/30-no signif changes  7/1-D3 cefepime (ID)  7/5-D7 cefepime; slow improvements  7/6-stable for rehab-no new complaints-ABX completed  7/7-completed ABX-awiat rehab

## 2022-07-07 NOTE — PROGRESS NOTE ADULT - TIME BILLING
as above: completed ABX-await rehab  multifactorial dyspnea-resp failure, copd/asthma, prior aspiration pna, diaphagm dysfunction, osas-now PNA/enterorhinovirus--O2 sat above 90%  PNA-aspiration--Sp and sw evaln, cefepime 6/29-D7 duration as per ID (completed 7/5) f/up CXR today 7/5 (not done)  copd/mucus plugging--acapella/chest vest rx, duoneb q 6, hypersal q 6, mucomyst 2 cc 10% q 8, symbicort/spiriva; hold             steroids (s/p pred 40)-now off rx  GI-aspiration --sp and sw evaln, precautions - thin liquids                       OSAS-refused rx prior  DVT prophylaxis   -balbuena in place for rehab  PT evaln--OOB                           BS control    CV-mult rx-back on eliquis  DC planning to rehab next 24-48 hrs; resp cleared    Renan Delgado MD-Pulmonary   [phone]

## 2022-07-07 NOTE — DISCHARGE NOTE NURSING/CASE MANAGEMENT/SOCIAL WORK - PATIENT PORTAL LINK FT
You can access the FollowMyHealth Patient Portal offered by [facility] by registering at the following website: [email or web address]. By joining [employer]’s FollowMyHealth portal, you will also be able to view your health information using other applications (apps) compatible with our system.

## 2022-07-07 NOTE — PROGRESS NOTE ADULT - PROBLEM SELECTOR PROBLEM 3
Clarification on Verapamil 120mg ER. Pt is only take once a day.
Paroxysmal atrial fibrillation
Urinary retention
Paroxysmal atrial fibrillation
Urinary retention

## 2022-07-07 NOTE — PROGRESS NOTE ADULT - PROBLEM SELECTOR PROBLEM 5
Type 2 diabetes mellitus
Peripheral neuropathy
Type 2 diabetes mellitus
Peripheral neuropathy
Type 2 diabetes mellitus

## 2022-07-07 NOTE — PROGRESS NOTE ADULT - PROBLEM SELECTOR PLAN 9
c/w celexa
c/w atorvastatin and holding atenolol in setting of sepsis  -aspirin being held outpatient due to new anemia
c/w celexa
c/w atorvastatin and holding atenolol in setting of sepsis  -aspirin being held outpatient due to new anemia
c/w celexa

## 2022-07-07 NOTE — PROGRESS NOTE ADULT - NSPROGADDITIONALINFOA_GEN_ALL_CORE
Discussed with patient, Scarlet Vee ACP.
d/w km [place]  D/w [facility], CM    plan for [facility]
Discussed with patient, [facility] ACP.
d/w [facility] [place]  D/w ACP, CM    d/c to [facility] today  d/c time 40 mins
Discussed with patient, Scarlet Vee ACP.
Discussed with patient, Scarlet Vee ACP.
d/w [facility] [place]  D/w [facility], CM    plan for [facility]

## 2022-07-07 NOTE — PROGRESS NOTE ADULT - REASON FOR ADMISSION
Sepsis 2/2 PNA, COPD exacerbation

## 2022-07-07 NOTE — PROGRESS NOTE ADULT - PROBLEM SELECTOR PLAN 2
Found to be hypoxic to mid 80s at home.   -c/w supplemental oxygen prn and wean as tolerated  -goal SpO2=88-94%  -s/p 125 mg solumedrol and short course of prednisone  -airway clearance  -duonebs Q6  -c/w home symbicort and spiriva  -Aspiration precautions  - f/u slp eval-regular diet with thin liquids
Found to be hypoxic to mid 80s at home.   -goal SpO2=88-94% now on RA  -s/p steroid now off. complete abx today.   -airway clearance, -duonebs Q6  -c/w home symbicort and spiriva  -Aspiration precautions  - f/u slp eval-regular diet with thin liquids    Dr. Sandy nielsen appreciated. CXR PA lat ordered.,
Found to be hypoxic to mid 80s at home.   -c/w supplemental oxygen prn and wean as tolerated  -goal SpO2=88-94%  -s/p 125 mg solumedrol  -c/w prednisone 40mg for a total of 5 days  -airway clearance with aerobika, HTS neb BID  -duonebs Q6  -c/w home symbicort and spiriva  -pt wants at least dysphagia 1 c honey thickened fluids understanding risks  -Aspiration precautions
Found to be hypoxic to mid 80s at home.   -goal SpO2=88-94% now on RA  -s/p steroid now off. complete abx    -airway clearance, -duonebs Q6  -c/w home symbicort and spiriva  -Aspiration precautions  - f/u slp eval-regular diet with thin liquids    Dr. Sandy nielsen appreciated.
Found to be hypoxic to mid 80s at home.   -c/w supplemental oxygen prn and wean as tolerated  -goal SpO2=88-94%  -s/p 125 mg solumedrol and short course of prednisone  -airway clearance  -duonebs Q6  -c/w home symbicort and spiriva  -Aspiration precautions  - f/u slp eval-regular diet with thin liquids
Found to be hypoxic to mid 80s at home.   -goal SpO2=88-94% now on RA  -s/p steroid now off. complete abx    -airway clearance, -duonebs Q6  -c/w home symbicort and spiriva  -Aspiration precautions  - f/u slp eval-regular diet with thin liquids    Dr. Sandy nielsen appreciated.
Found to be hypoxic to mid 80s at home.   -c/w supplemental oxygen prn and wean as tolerated  -goal SpO2=88-94%  -s/p 125 mg solumedrol and short course of prednisone  -airway clearance  -duonebs Q6  -c/w home symbicort and spiriva  -Aspiration precautions  - f/u slp eval-regular diet with thin liquids
Found to be hypoxic to mid 80s at home.   -c/w supplemental oxygen prn and wean as tolerated  -goal SpO2=88-94%  -s/p 125 mg solumedrol  -c/w prednisone 40mg for a total of 5 days  -airway clearance  -duonebs Q6  -c/w home symbicort and spiriva  -Aspiration precautions  - f/u slp eval-regular diet with thin liquids
Found to be hypoxic to mid 80s at home.   -c/w supplemental oxygen prn and wean as tolerated  -goal SpO2=88-94%  -s/p 125 mg solumedrol  -c/w prednisone 40mg for a total of 5 days  -airway clearance  -duonebs Q6  -c/w home symbicort and spiriva  -pt wants at least dysphagia 1 c honey thickened fluids understanding risks  -Aspiration precautions  - f/u slp eval

## 2022-07-07 NOTE — PROGRESS NOTE ADULT - PROBLEM SELECTOR PROBLEM 9
CAD (coronary artery disease)
Anxiety and depression
CAD (coronary artery disease)
Anxiety and depression

## 2022-07-07 NOTE — PROGRESS NOTE ADULT - PROBLEM SELECTOR PROBLEM 4
Paroxysmal atrial fibrillation
Peripheral neuropathy
Paroxysmal atrial fibrillation
Peripheral neuropathy

## 2022-07-07 NOTE — DISCHARGE NOTE PROVIDER - NSDCCPCAREPLAN_GEN_ALL_CORE_FT
PRINCIPAL DISCHARGE DIAGNOSIS  Diagnosis: Sepsis due to pneumonia  Assessment and Plan of Treatment: You were treated and completed course of antibiotics while in the hospital. Please monitor for any additional or return of symptoms.      SECONDARY DISCHARGE DIAGNOSES  Diagnosis: Urinary retention  Assessment and Plan of Treatment: You were noted to have retention of your urine likely in setting of prolong hospitalization and immobilization.   Please cont to work with physical therapy at rehab and when more ambulatory we can try to remove balbuena    Diagnosis: Paroxysmal atrial fibrillation  Assessment and Plan of Treatment: Cont with your meds as directed including blood thinner    Diagnosis: Type 2 diabetes mellitus  Assessment and Plan of Treatment: cont to take your DM meds as directed    Diagnosis: HTN (hypertension)  Assessment and Plan of Treatment: Please hold your BP meds for now given your low BP  Can be resumed as outpt if BP improves    Diagnosis: COPD exacerbation  Assessment and Plan of Treatment: Cont to take all your inhalers and nebs as directed  follow up with Dr. Delgado

## 2022-07-07 NOTE — PROGRESS NOTE ADULT - PROBLEM SELECTOR PLAN 8
c/w atorvastatin and holding atenolol in setting of sepsis  -aspirin being held outpatient due to new anemia
c/w atorvastatin and holding atenolol in setting of sepsis  -aspirin being held outpatient due to new anemia
+FOBT. brown stool. Hgb trending down as an outpt. apixaban was changed to Hep gtt to see if H/H drops more but it did not so switched back to Eliquis. [facility] called to see if pt can cont AC as an outpt. Outpt GI is [facility] but doesn't consult here  Will give IV iron, no plans for EGD/colonoscopy
c/w atorvastatin and holding atenolol in setting of sepsis  -aspirin being held outpatient due to new anemia
+FOBT. brown stool. Hgb trending down as an outpt. apixaban was changed to Hep gtt to see if H/H drops more but it did not so switched back to Eliquis. [facility] called to see if pt can cont AC as an outpt. Outpt GI is [facility] but doesn't consult here  Will give IV iron, no plans for EGD/colonoscopy

## 2022-07-07 NOTE — DISCHARGE NOTE PROVIDER - NSDCFUSCHEDAPPT_GEN_ALL_CORE_FT
Bairon oGttlieb  [facility]  Aurelio Aguila  Scheduled Appointment: 08/01/2022

## 2022-07-07 NOTE — PROGRESS NOTE ADULT - PROBLEM SELECTOR PROBLEM 10
Prophylactic measure
Anxiety and depression
Prophylactic measure
Anxiety and depression

## 2022-07-07 NOTE — PROGRESS NOTE ADULT - PROBLEM SELECTOR PLAN 5
c/w lyrica and flexeril
Last A1C from June 7, 2022 =5.7%  -holding home oral hyperglycemics  -A1C 5.9  -sliding scale insulin  -8 units of lantus in the AM
Last A1C from June 7, 2022 =5.7%  -holding home oral hyperglycemics  -A1C 5.9  -sliding scale insulin  -10 units of lantus in the AM
Last A1C from June 7, 2022 =5.7%  -holding home oral hyperglycemics  -A1C 5.9  -sliding scale insulin  -8 units of lantus in the AM
Last A1C from June 7, 2022 =5.7%  -holding home oral hyperglycemics  -A1C 5.9  -sliding scale insulin  -10 units of lantus in the AM
Last A1C from June 7, 2022 =5.7%  -holding home oral hyperglycemics  -f/u A1C  -sliding scale insulin
c/w lyrica and flexeril
Last A1C from June 7, 2022 =5.7%  -holding home oral hyperglycemics  -A1C 5.9  -sliding scale insulin  -5 units of lantus in the AM
Last A1C from June 7, 2022 =5.7%  -holding home oral hyperglycemics  -f/u A1C  -sliding scale insulin

## 2022-07-07 NOTE — DISCHARGE NOTE NURSING/CASE MANAGEMENT/SOCIAL WORK - NSDCPEFALRISK_GEN_ALL_CORE
For information on Fall & Injury Prevention, visit: [email or web address] OR  [email or web address] OR  https://www.cdc.gov/steadi/patient.html

## 2022-07-07 NOTE — PROGRESS NOTE ADULT - PROBLEM SELECTOR PLAN 4
c/w Eliquis  -low dose Metoprolol started
c/w lyrica and flexeril
c/w Eliquis  -low dose Metoprolol started
c/w lyrica and flexeril
c/w lyrica and flexeril

## 2022-07-07 NOTE — PROGRESS NOTE ADULT - SUBJECTIVE AND OBJECTIVE BOX
[facility] Division of Hospital Medicine  Angelina Newby MD  Pager (M-F, 7C-7L): [phone]  Other Times:  [phone]    Patient is a 81y old  Male who presents with a chief complaint of Sepsis 2/2 PNA, COPD exacerbation (2022 08:29)      SUBJECTIVE / OVERNIGHT EVENTS:  feeling well. denies any complaints. had balbuena placed this am due to recurrent retention .    ADDITIONAL REVIEW OF SYSTEMS: otherwise neg    MEDICATIONS  (STANDING):  acetylcysteine 10%  Inhalation 4 milliLiter(s) Inhalation every 8 hours  albuterol/ipratropium for Nebulization 3 milliLiter(s) Nebulizer every 6 hours  apixaban 5 milliGRAM(s) Oral every 12 hours  atorvastatin 40 milliGRAM(s) Oral at bedtime  budesonide 160 MICROgram(s)/formoterol 4.5 MICROgram(s) Inhaler 2 Puff(s) Inhalation two times a day  chlorhexidine 2% Cloths 1 Application(s) Topical daily  citalopram 20 milliGRAM(s) Oral daily  dextrose 5%. 1000 milliLiter(s) (100 mL/Hr) IV Continuous <Continuous>  dextrose 5%. 1000 milliLiter(s) (50 mL/Hr) IV Continuous <Continuous>  dextrose 50% Injectable 25 Gram(s) IV Push once  dextrose 50% Injectable 12.5 Gram(s) IV Push once  dextrose 50% Injectable 25 Gram(s) IV Push once  glucagon  Injectable 1 milliGRAM(s) IntraMuscular once  guaiFENesin ER 1200 milliGRAM(s) Oral every 12 hours  insulin glargine Injectable (LANTUS) 12 Unit(s) SubCutaneous every morning  insulin lispro (ADMELOG) corrective regimen sliding scale   SubCutaneous three times a day before meals  insulin lispro (ADMELOG) corrective regimen sliding scale   SubCutaneous at bedtime  metoprolol tartrate 12.5 milliGRAM(s) Oral every 12 hours  pantoprazole  Injectable 40 milliGRAM(s) IV Push every 12 hours  polyethylene glycol 3350 17 Gram(s) Oral two times a day  pregabalin 100 milliGRAM(s) Oral three times a day  senna 2 Tablet(s) Oral at bedtime  sodium chloride 3%  Inhalation 4 milliLiter(s) Inhalation every 12 hours  tiotropium 18 MICROgram(s) Capsule 1 Capsule(s) Inhalation daily    MEDICATIONS  (PRN):  acetaminophen     Tablet .. 650 milliGRAM(s) Oral every 6 hours PRN Temp greater or equal to 38C (100.4F), Mild Pain (1 - 3), Moderate Pain (4 - 6)  cyclobenzaprine 5 milliGRAM(s) Oral three times a day PRN Muscle Spasm  dextrose Oral Gel 15 Gram(s) Oral once PRN Blood Glucose LESS THAN 70 milliGRAM(s)/deciliter      CAPILLARY BLOOD GLUCOSE      POCT Blood Glucose.: 136 mg/dL (2022 12:06)  POCT Blood Glucose.: 140 mg/dL (2022 07:51)  POCT Blood Glucose.: 154 mg/dL (2022 22:10)  POCT Blood Glucose.: 149 mg/dL (2022 16:54)    I&O's Summary    2022 07:01  -  2022 07:00  --------------------------------------------------------  IN: 1020 mL / OUT: 800 mL / NET: 220 mL    2022 07:01  -  2022 14:34  --------------------------------------------------------  IN: 480 mL / OUT: 400 mL / NET: 80 mL        PHYSICAL EXAM:  Vital Signs Last 24 Hrs  T(C): 36.8 (2022 11:25), Max: 37 (2022 21:27)  T(F): 98.3 (2022 11:25), Max: 98.6 (2022 21:27)  HR: 95 (2022 11:46) (89 - 102)  BP: 112/75 (2022 11:25) (97/65 - 114/70)  BP(mean): --  RR: 18 (2022 11:46) (18 - 18)  SpO2: 93% (2022 11:46) (91% - 95%)    CONSTITUTIONAL: NAD, well-groomed  EYES:  conjunctiva and sclera clear  ENMT: Moist oral mucosa  NECK: Supple, no palpable masses; no JVD  RESPIRATORY: bibasilar rhonchi. no wheezing  CARDIOVASCULAR: Regular rate and rhythm, normal S1 and S2, no murmur/rub/gallop; No lower extremity edema  ABDOMEN: Nontender to palpation, normoactive bowel sounds, no rebound/guarding  MUSCULOSKELETAL:  no clubbing or cyanosis of digits; no joint swelling or tenderness to palpation  PSYCH: A+O to person, place, and time; affect appropriate  SKIN: No rashes; no palpable lesions    LABS:                        10.0   8.20  )-----------( 232      ( 2022 06:31 )             32.4     07    131<L>  |  94<L>  |  9   ----------------------------<  138<H>  4.4   |  27  |  0.43<L>    Ca    9.6      2022 06:38            Urinalysis Basic - ( 2022 06:32 )    Color: Yellow / Appearance: Clear / S.013 / pH: x  Gluc: x / Ketone: Small  / Bili: Negative / Urobili: Negative   Blood: x / Protein: Trace / Nitrite: Negative   Leuk Esterase: Negative / RBC: 4 /hpf / WBC 4 /HPF   Sq Epi: x / Non Sq Epi: 1 /hpf / Bacteria: Negative          RADIOLOGY & ADDITIONAL TESTS:  Results Reviewed:   Imaging Personally Reviewed:  Electrocardiogram Personally Reviewed:    COORDINATION OF CARE:  Care Discussed with Consultants/Other Providers [Y/N]:  Prior or Outpatient Records Reviewed [Y/N]:

## 2022-07-07 NOTE — PROGRESS NOTE ADULT - PROBLEM SELECTOR PLAN 6
-holding home medication telmisartan
Last A1C from June 7, 2022 =5.7%  -holding home oral hyperglycemics  -A1C 5.9  -sliding scale insulin  -10 units of lantus in the AM
-holding home medication telmisartan
-holding home medications of telmisartan and atenolol
-holding home medication telmisartan
Last A1C from June 7, 2022 =5.7%  -holding home oral hyperglycemics  -A1C 5.9  -sliding scale insulin  -10 units of lantus in the AM
-holding home medication telmisartan

## 2022-07-07 NOTE — DISCHARGE NOTE PROVIDER - CARE PROVIDER_API CALL
Renan Delgado)  Internal Medicine; Pulmonary Disease  [address], [address]  [place] [address]  Phone: [phone]  Fax: [phone]  Established Patient  Follow Up Time: 1 week

## 2022-07-07 NOTE — PROGRESS NOTE ADULT - PROBLEM SELECTOR PROBLEM 6
HTN (hypertension)
Type 2 diabetes mellitus
HTN (hypertension)
Type 2 diabetes mellitus
HTN (hypertension)

## 2022-07-07 NOTE — PROGRESS NOTE ADULT - PROVIDER SPECIALTY LIST ADULT
Hospitalist
Infectious Disease
Hospitalist
Infectious Disease
Pulmonology
Infectious Disease
Hospitalist
Pulmonology
Hospitalist
Hospitalist
Internal Medicine
Hospitalist
Internal Medicine
Internal Medicine

## 2022-07-07 NOTE — PROGRESS NOTE ADULT - SUBJECTIVE AND OBJECTIVE BOX
CHIEF COMPLAINT: f/up resp failure, copd/asthma, prior aspiration pna, diaphagm dysfunction, osas-now PNA/enterorhinovirus-no changes-weak over all, mild cough    Interval Events: await rehab    REVIEW OF SYSTEMS:  Constitutional: No fevers or chills. No weight loss. + fatigue or generalized malaise.  Eyes: No itching or discharge from the eyes  ENT: No ear pain. No ear discharge. No nasal congestion. No post nasal drip. No epistaxis. No throat pain. No sore throat. No difficulty swallowing.   CV: No chest pain. No palpitations. No lightheadedness or dizziness.   Resp: No dyspnea at rest. No dyspnea on exertion. No orthopnea. No wheezing. No cough. No stridor. No sputum production. No chest pain with respiration.  GI: No nausea. No vomiting. No diarrhea.  MSK: No joint pain or pain in any extremities  Integumentary: No skin lesions. No pedal edema.  Neurological: + gross motor weakness. No sensory changes.  [+ All other systems negative  [ ] Unable to assess ROS because ________    OBJECTIVE:  ICU Vital Signs Last 24 Hrs  T(C): 37 (07 Jul 2022 05:16), Max: 37 (06 Jul 2022 21:27)  T(F): 98.6 (07 Jul 2022 05:16), Max: 98.6 (06 Jul 2022 21:27)  HR: 89 (07 Jul 2022 05:16) (89 - 106)  BP: 105/65 (07 Jul 2022 05:16) (97/65 - 121/78)  BP(mean): --  ABP: --  ABP(mean): --  RR: 18 (07 Jul 2022 05:16) (18 - 18)  SpO2: 95% (07 Jul 2022 05:16) (91% - 95%)        07-05 @ 07:01  -  07-06 @ 07:00  --------------------------------------------------------  IN: 980 mL / OUT: 950 mL / NET: 30 mL    07-06 @ 07:01  -  07-07 @ 05:30  --------------------------------------------------------  IN: 1020 mL / OUT: 800 mL / NET: 220 mL      CAPILLARY BLOOD GLUCOSE      POCT Blood Glucose.: 154 mg/dL (06 Jul 2022 22:10)      PHYSICAL EXAM: NAD in bed on NC  General: Awake, alert, oriented X 3.   HEENT: Atraumatic, normocephalic.                 Mallampatti Grade 2                No nasal congestion.                No tonsillar or pharyngeal exudates.  Lymph Nodes: No palpable lymphadenopathy  Neck: No JVD. No carotid bruit.   Respiratory: abnormal chest expansion                         Normal percussion                         Normal and equal air entry                         No wheeze, rhonchi but + bibasilar rales.  Cardiovascular: S1 S2 normal. No murmurs, rubs or gallops.   Abdomen: Soft, non-tender, non-distended. No organomegaly. Normoactive bowel sounds.  Extremities: Warm to touch. Peripheral pulse palpable. No pedal edema.   Skin: No rashes or skin lesions  Neurological: Motor and sensory examination equal and normal in all four extremities.  Psychiatry: Appropriate mood and affect.    HOSPITAL MEDICATIONS:  MEDICATIONS  (STANDING):  acetylcysteine 10%  Inhalation 4 milliLiter(s) Inhalation every 8 hours  albuterol/ipratropium for Nebulization 3 milliLiter(s) Nebulizer every 6 hours  apixaban 5 milliGRAM(s) Oral every 12 hours  atorvastatin 40 milliGRAM(s) Oral at bedtime  budesonide 160 MICROgram(s)/formoterol 4.5 MICROgram(s) Inhaler 2 Puff(s) Inhalation two times a day  chlorhexidine 2% Cloths 1 Application(s) Topical daily  citalopram 20 milliGRAM(s) Oral daily  dextrose 5%. 1000 milliLiter(s) (100 mL/Hr) IV Continuous <Continuous>  dextrose 5%. 1000 milliLiter(s) (50 mL/Hr) IV Continuous <Continuous>  dextrose 50% Injectable 25 Gram(s) IV Push once  dextrose 50% Injectable 12.5 Gram(s) IV Push once  dextrose 50% Injectable 25 Gram(s) IV Push once  glucagon  Injectable 1 milliGRAM(s) IntraMuscular once  guaiFENesin ER 1200 milliGRAM(s) Oral every 12 hours  insulin glargine Injectable (LANTUS) 12 Unit(s) SubCutaneous every morning  insulin lispro (ADMELOG) corrective regimen sliding scale   SubCutaneous three times a day before meals  insulin lispro (ADMELOG) corrective regimen sliding scale   SubCutaneous at bedtime  metoprolol tartrate 12.5 milliGRAM(s) Oral every 12 hours  pantoprazole  Injectable 40 milliGRAM(s) IV Push every 12 hours  polyethylene glycol 3350 17 Gram(s) Oral two times a day  pregabalin 100 milliGRAM(s) Oral three times a day  senna 2 Tablet(s) Oral at bedtime  sodium chloride 3%  Inhalation 4 milliLiter(s) Inhalation every 12 hours  tiotropium 18 MICROgram(s) Capsule 1 Capsule(s) Inhalation daily    MEDICATIONS  (PRN):  acetaminophen     Tablet .. 650 milliGRAM(s) Oral every 6 hours PRN Temp greater or equal to 38C (100.4F), Mild Pain (1 - 3), Moderate Pain (4 - 6)  cyclobenzaprine 5 milliGRAM(s) Oral three times a day PRN Muscle Spasm  dextrose Oral Gel 15 Gram(s) Oral once PRN Blood Glucose LESS THAN 70 milliGRAM(s)/deciliter      LABS:                        9.8    7.99  )-----------( 244      ( 06 Jul 2022 07:06 )             32.1     07-06    133<L>  |  95<L>  |  9   ----------------------------<  142<H>  4.1   |  28  |  0.40<L>    Ca    9.3      06 Jul 2022 07:05                MICROBIOLOGY:     RADIOLOGY:  [ ] Reviewed and interpreted by me    Point of Care Ultrasound Findings:    PFT:    EKG:

## 2022-07-07 NOTE — PROGRESS NOTE ADULT - PROBLEM SELECTOR PLAN 3
overnight retention with 1 liter s/p 900cc removed  again this am with 600cc noted. will straight cath again.   if persists will need balbuena cath to go to rehab and improve mobility prior to re attempt.   d/w son over phone.  agrees   would hold off flomax given pt with h/o sulfur allergy with anaphylaxis.
c/w Eliquis  -low dose Metoprolol started
c/w eliquis  -holding atenolol in the setting of sepsis
s/p esha best this am will need to go to rehab with it.,   d/w son over phone.  agrees   would hold off flomax given pt with h/o sulfur allergy with anaphylaxis.
c/w heparin drip in the setting of +FOBT and downtrending hemoglobin  -low dose Metoprolol started
c/w eliquis  -low dose Metoprolol started
c/w heparin drip in the setting of +FOBT and downtrending hemoglobin  -low dose Metoprolol started

## 2022-07-07 NOTE — DISCHARGE NOTE PROVIDER - NSDCMRMEDTOKEN_GEN_ALL_CORE_FT
Albuterol (Eqv-ProAir HFA) 90 mcg/inh inhalation aerosol: 2 puff(s) inhaled every 6 hours, As Needed  atenolol 25 mg oral tablet: 1 tab(s) orally once a day  Breo Ellipta 200 mcg-25 mcg/inh inhalation powder: 1 puff(s) inhaled once a day  citalopram 20 mg oral tablet: 1 tab(s) orally once a day  cyclobenzaprine 5 mg oral tablet: 1 tab(s) orally 3 times a day, As Needed  Eliquis 5 mg oral tablet: 1 tab(s) orally 2 times a day  glipiZIDE 5 mg oral tablet: 1 tab(s) orally once a day  guaiFENesin 1200 mg oral tablet, extended release: 1 tab(s) orally every 12 hours  Incruse Ellipta 62.5 mcg/inh inhalation powder: 1 puff(s) inhaled every 24 hours  ipratropium 42 mcg/inh (0.06%) nasal spray: 2 spray(s) nasal every 8 hours  ipratropium-albuterol 0.5 mg-2.5 mg/3 mL inhalation solution: 3 milliliter(s) inhaled every 6 hours, As Needed  lovastatin 20 mg oral tablet: 1 tab(s) orally once a day  Lyrica 100 mg oral capsule: 1 cap(s) orally 3 times a day  metFORMIN 1000 mg oral tablet: 1 tab(s) orally 2 times a day  omeprazole 40 mg oral delayed release capsule: 1 cap(s) orally once a day  polyethylene glycol 3350 oral powder for reconstitution: 17 gram(s) orally 2 times a day, As Needed  senna leaf extract oral tablet: 2 tab(s) orally once a day (at bedtime)  Tradjenta 5 mg oral tablet: 1 tab(s) orally once a day  Vitamin D3 1250 mcg (50,000 intl units) oral capsule: 1 cap(s) orally once a week

## 2022-07-07 NOTE — DISCHARGE NOTE PROVIDER - HOSPITAL COURSE
82 yo M PMH of COPD, DM II, Afib, 2 MIs s/p 2 stents, esophageal cancer s/p esophagectomy, peripheral neuropathy and HTN who presents with 3 days of SOB and dry cough. He states that his shortness of breath began 3 days ago while at rest and was associated with a dry cough that was not productive of any sputum. He states that since then his symptoms have worsened and he consulted his outpatient pulmonologist who prescribed him steroids earlier today, however he was unable to take them prior to presentation. Home pulse oximetry today showed SpO2 of mid to low 80s which prompted him to seek transport to the hospital. Upon arrival to the ED, he was found to be hypotensive, febrile, tachypnic. Labs were significant for leukocytosis, elevated lactate and hyperkalemic. CXR showed bibasilar atelectasis with possible pleural effusions bilaterally. CT angio showed no evidence of PE and lower lobe infiltrates compatible with possible aspiration pneumonia. EKG showed no acute ischemic changes. He was given fluids, vanc and cefepime    Hospitali course: Patient was admitted to medicine for sepsis with likely  PNA in setting of entero/rhinovirus. Patient was treated on broad spectrum abx x 7 days and completed the course prior to discharge. PAtient was also seen and evaluated by pulm Dr. Delgado and aggressive chest PT and nebs. Patient initially required oxygen supplementation which improved significantly now no longer requiring O2. S+S eval was performed which did not show any significant evidence of aspiration.     Of note, hosp course was complicated by urinary retention likely due to prolong immbolization and h/o BPH (not on any treatment prior). patient was straight cath'ed twice and on third time balbuena cath was left in place. Will reattempt TOV when patient is more ambulatory at rehab.

## 2022-09-26 PROBLEM — K21.9 CHRONIC GERD: Status: ACTIVE | Noted: 2021-06-04

## 2022-09-26 PROBLEM — N40.0 BPH (BENIGN PROSTATIC HYPERPLASIA): Status: ACTIVE | Noted: 2022-01-01

## 2022-09-26 PROBLEM — E11.9 TYPE 2 DIABETES MELLITUS: Status: ACTIVE | Noted: 2021-06-04

## 2022-09-26 PROBLEM — I10 BENIGN ESSENTIAL HYPERTENSION: Status: ACTIVE | Noted: 2021-06-04

## 2022-09-26 PROBLEM — E78.00 HIGH CHOLESTEROL: Status: ACTIVE | Noted: 2021-06-04

## 2022-09-26 PROBLEM — L89.90 DECUBITUS ULCERS: Status: ACTIVE | Noted: 2022-01-01

## 2022-09-26 PROBLEM — F41.9 ANXIETY: Status: ACTIVE | Noted: 2021-06-04

## 2022-09-26 NOTE — PHYSICAL EXAM
[Well-Appearing] : well-appearing [Normal Voice/Communication] : normal voice/communication [No Respiratory Distress] : no respiratory distress  [No Accessory Muscle Use] : no accessory muscle use [Speech Grossly Normal] : speech grossly normal [Alert and Oriented x3] : oriented to person, place, and time [de-identified] : Does not appear in any respiratory distress on room air; resting in bed [de-identified] : Wearing glasses [de-identified] : R = 16–20

## 2022-09-26 NOTE — ASSESSMENT
[FreeTextEntry1] : Decubitus ulcers\par Has received hospital bed\par Wound care services\par Analgesics as needed\par \par Anxiety\par Continue citalopram\par Continue Abilify\par Psych follow-up\par He is DNR and requests comfort care\par Accepted on home hospice care today\par \par COPD\par Had acute respiratory distress yesterday evening with tachypnea, tachycardia, and desaturation\par Slightly improved today with steroid therapy\par Just received home oxygen therapy = will use 2 L/min via nasal cannula 24 hours/day\par On Breo and Incruse\par Can use albuterol MDI 1 to 2 sprays every 4-6 hours as needed\par Receiving home nebulizer in a.m. = can then switch to nebulizer therapy every 4-6 hours as needed\par Took prednisone 30 mg yesterday evening and given another dose of prednisone 30 mg today\par Has appointment with pulmonary medicine tomorrow via TE[facility]\par \par Hypertension\par Continue atenolol, telmisartan\par Monitor blood pressure\par Watch sodium in diet\par Cardiology follow-up\par \par Hyperlipidemia\par Low-fat diet\par On daily lovastatin for now\par \par Diabetes\par Continue medications\par Tradjenta, metformin, glipizide, Januvia\par ADA diet\par \par BPH\par Now on Flomax at bedtime\par Urology follow-up\par \par GERD\par Antireflux diet/precautions\par On daily omeprazole\par GI follow-up\par \par He/his family will call for any medical issues\par All of the above was discussed in detail with him\par All of his questions were answered\par He verbally confirmed understanding of all of the above and agreement with the above plan\par I will complete hospice certification if requested

## 2022-09-26 NOTE — HEALTH RISK ASSESSMENT
[Intercurrent ED visits] : went to ED [Intercurrent hospitalizations] : was admitted to the hospital  [Former] : Former [No] : In the past 12 months have you used drugs other than those required for medical reasons? No [No falls in past year] : Patient reported no falls in the past year [Patient refused screening] : Patient refused screening [de-identified] : None = he is essentially bedbound [de-identified] : Regular

## 2022-09-26 NOTE — REVIEW OF SYSTEMS
[Fatigue] : fatigue [Vision Problems] : vision problems [Hearing Loss] : hearing loss [Postnasal Drip] : postnasal drip [Shortness Of Breath] : shortness of breath [Constipation] : constipation [Heartburn] : heartburn [Back Pain] : back pain [Insomnia] : insomnia [Anxiety] : anxiety [Negative] : Heme/Lymph

## 2022-09-26 NOTE — HISTORY OF PRESENT ILLNESS
[Home] : at home, [unfilled] , at the time of the visit. [Medical Office: ([facility])___] : at the medical office located in  [Verbal consent obtained from patient] : the patient, [unfilled] [FreeTextEntry8] : The patient reports that he was just evaluated and accepted for home hospice care.\par He reports that he had acute respiratory distress last night.  He was tachycardic, tachypneic, and had poor oxygen saturations.  He reports that he refuses to go back to the hospital and only wants to be kept comfortable at home.  He took prednisone 30 mg yesterday evening along with albuterol MDI and 1 dose of Levaquin.  He reports that he is feeling slightly improved today.  He reports that home oxygen was just delivered and that a nebulizer will be delivered tomorrow.  He reports that he is uncomfortable at times due to open wounds/decubitus ulcers on his back and buttocks.

## 2022-09-27 PROBLEM — J44.9 CHRONIC OBSTRUCTIVE PULMONARY DISEASE, UNSPECIFIED COPD TYPE: Status: ACTIVE | Noted: 2021-06-03

## 2022-09-27 NOTE — REASON FOR VISIT
[Follow-Up] : a follow-up visit [COPD] : COPD [Shortness of Breath] : shortness of breath [Home] : at home, [unfilled] , at the time of the visit. [Medical Office: ([facility])___] : at the medical office located in  [Patient] : the patient [Family Member] : family member

## 2022-09-28 PROBLEM — J96.11 CHRONIC RESPIRATORY FAILURE WITH HYPOXIA: Status: ACTIVE | Noted: 2022-01-01

## 2022-09-28 NOTE — REVIEW OF SYSTEMS
[Fatigue] : fatigue [Poor Appetite] : poor appetite [Cough] : cough [SOB on Exertion] : sob on exertion [Negative] : Psychiatric [Fever] : no fever [Recent Wt Gain (___ Lbs)] : ~T no recent weight gain [EDS] : no eds [Chills] : no chills [Recent Wt Loss (___ Lbs)] : ~T no recent weight loss [Hemoptysis] : no hemoptysis [Chest Tightness] : no chest tightness [Frequent URIs] : no frequent URIs [Sputum] : no sputum [Dyspnea] : no dyspnea [Pleuritic Pain] : no pleuritic pain [Wheezing] : no wheezing [A.M. Dry Mouth] : no a.m. dry mouth

## 2022-09-28 NOTE — HISTORY OF PRESENT ILLNESS
[Former] : former [Never] : never [TextBox_4] : Mr. Hays is 82 year old male with a history of abnormal chest CT, acid reflux disease, allergic rhinitis, asthma, obstructive lung disease, CHILO and SOB who presents via video for a follow up visit. patient daughter participated in visit. \par \par His chief complaint is shortness of breath. \par \par Patient daughter reports patient was discharged from [facility] 9/8/2022. Patient's was SOB with oxygen saturation going down 50s on Sunday to Monday. Patient refused to go to hospital and patient's son who is MD started him on prednisone 30 mg, Levaquin, albuterol. Patient oxygen saturation improved to 89%. Daughter reports patient qualified for hospice care and pt's PCP started him on oxygen 2L/min, Prednisone 30 mg, albuterol nebulizer treatment Q6H. Since starting oxygen patient reports he is not SOB and oxygen saturation currently at 96%. Patient is waiting fro nebulizer machine from pharmacy, will start albuterol nebulizer treatment once nebulizer machine is obtained. Patient reports he is currently on Prednisone 30 mg, Breo and Incruse inhaler. \par Patient reports he is sleeping well, and appetite is fair. \par \par Patient denies fever, chills, wheezing, nasal congestion, runny nose or heartburn/reflux.\par \par

## 2024-12-06 NOTE — PROGRESS NOTE ADULT - PROBLEM SELECTOR PROBLEM 1
show
Sepsis due to pneumonia

## 2025-07-10 NOTE — PHYSICAL THERAPY INITIAL EVALUATION ADULT - STRENGTHENING, PT EVAL
12 Hour(s) 52 Minute(s)
GOAL: Pt will improve bilateral LE strength to 4/5, for increased limb stability, in 2 weeks.